# Patient Record
Sex: MALE | Race: WHITE | Employment: OTHER | URBAN - NONMETROPOLITAN AREA
[De-identification: names, ages, dates, MRNs, and addresses within clinical notes are randomized per-mention and may not be internally consistent; named-entity substitution may affect disease eponyms.]

---

## 2017-01-25 DIAGNOSIS — G56.03 BILATERAL CARPAL TUNNEL SYNDROME: Primary | ICD-10-CM

## 2017-01-27 RX ORDER — DICLOFENAC SODIUM 75 MG/1
TABLET, DELAYED RELEASE ORAL
Qty: 90 TABLET | Refills: 0 | Status: SHIPPED | OUTPATIENT
Start: 2017-01-27 | End: 2017-06-15

## 2017-02-01 RX ORDER — DICLOFENAC SODIUM 75 MG/1
TABLET, DELAYED RELEASE ORAL
Qty: 90 TABLET | Refills: 0 | OUTPATIENT
Start: 2017-02-01

## 2017-04-04 ENCOUNTER — OFFICE VISIT (OUTPATIENT)
Dept: OTOLARYNGOLOGY | Facility: OTHER | Age: 57
End: 2017-04-04
Attending: PHYSICIAN ASSISTANT
Payer: COMMERCIAL

## 2017-04-04 VITALS
SYSTOLIC BLOOD PRESSURE: 128 MMHG | WEIGHT: 215 LBS | DIASTOLIC BLOOD PRESSURE: 76 MMHG | HEART RATE: 66 BPM | TEMPERATURE: 96.8 F | BODY MASS INDEX: 30.78 KG/M2 | HEIGHT: 70 IN

## 2017-04-04 DIAGNOSIS — H91.93 HEARING LOSS, BILATERAL: ICD-10-CM

## 2017-04-04 DIAGNOSIS — H61.23 BILATERAL IMPACTED CERUMEN: Primary | ICD-10-CM

## 2017-04-04 PROCEDURE — 69210 REMOVE IMPACTED EAR WAX UNI: CPT | Performed by: PHYSICIAN ASSISTANT

## 2017-04-04 PROCEDURE — 99213 OFFICE O/P EST LOW 20 MIN: CPT | Mod: 25 | Performed by: PHYSICIAN ASSISTANT

## 2017-04-04 PROCEDURE — 92504 EAR MICROSCOPY EXAMINATION: CPT | Mod: TC | Performed by: PHYSICIAN ASSISTANT

## 2017-04-04 PROCEDURE — 99213 OFFICE O/P EST LOW 20 MIN: CPT

## 2017-04-04 RX ORDER — HYDROCODONE BITARTRATE AND ACETAMINOPHEN 5; 325 MG/1; MG/1
1-2 TABLET ORAL
COMMUNITY
Start: 2014-08-25 | End: 2017-04-21

## 2017-04-04 RX ORDER — ACETAMINOPHEN 500 MG
1000 TABLET ORAL
COMMUNITY
End: 2017-04-21

## 2017-04-04 RX ORDER — IBUPROFEN 400 MG/1
400 TABLET, FILM COATED ORAL
COMMUNITY
End: 2017-04-21

## 2017-04-04 ASSESSMENT — PAIN SCALES - GENERAL: PAINLEVEL: MODERATE PAIN (4)
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
## 2017-04-04 NOTE — NURSING NOTE
"Chief Complaint   Patient presents with     Cerumen (impacted)     Ear Cleaning       Initial /76 (Cuff Size: Adult Regular)  Pulse 66  Temp 96.8  F (36  C) (Tympanic)  Ht 5' 10\" (1.778 m)  Wt 215 lb (97.5 kg)  BMI 30.85 kg/m2 Estimated body mass index is 30.85 kg/(m^2) as calculated from the following:    Height as of this encounter: 5' 10\" (1.778 m).    Weight as of this encounter: 215 lb (97.5 kg).  Medication Reconciliation: complete   Patricia Nichole      "

## 2017-04-04 NOTE — MR AVS SNAPSHOT
"              After Visit Summary   4/4/2017    Villa Ruiz    MRN: [id number]           Patient Information     Date Of Birth          1960        Visit Information        Provider Department      4/4/2017 8:45 AM Yee Knowles [facility]-C [facility]        Today's Diagnoses     Bilateral impacted cerumen    -  1    Hearing loss, bilateral          Care Instructions    Ears were cleaned  Follow up as needed for ear cleaning    If there are concerns or questions, Call [phone] and ask for nurse        Follow-ups after your visit        Follow-up notes from your care team     Return if symptoms worsen or fail to improve.      Who to contact     If you have questions or need follow up information about today's clinic visit or your schedule please contact [facility] directly at [phone].  Normal or non-critical lab and imaging results will be communicated to you by Share Your Brainhart, letter or phone within 4 business days after the clinic has received the results. If you do not hear from us within 7 days, please contact the clinic through Share Your Brainhart or phone. If you have a critical or abnormal lab result, we will notify you by phone as soon as possible.  Submit refill requests through Vaxart or call your pharmacy and they will forward the refill request to us. Please allow 3 business days for your refill to be completed.          Additional Information About Your Visit        Share Your Brainhart Information     Vaxart lets you send messages to your doctor, view your test results, renew your prescriptions, schedule appointments and more. To sign up, go to [email or web address] . Click on \"Log in\" on the left side of the screen, which will take you to the Welcome page. Then click on \"Sign up Now\" on the right side of the page.     You will be asked to enter the access code listed below, as well as some personal information. Please follow the directions to create your username and password.     Your access code " "is: [email or web address]  Expires: 7/3/2017  9:05 AM     Your access code will  in 90 days. If you need help or a new code, please call your [facility] clinic or [phone].        Care EveryWhere ID     This is your Care EveryWhere ID. This could be used by other organizations to access your [facility] medical records  [id number]        Your Vitals Were     Pulse Temperature Height BMI (Body Mass Index)          66 96.8  F (36  C) (Tympanic) 5' 10\" (1.778 m) 30.85 kg/m2         Blood Pressure from Last 3 Encounters:   17 128/76   16 120/82   16 124/82    Weight from Last 3 Encounters:   17 215 lb (97.5 kg)   16 210 lb (95.3 kg)   16 215 lb (97.5 kg)              Today, you had the following     No orders found for display       Primary Care Provider Office Phone # Fax #    Judah Moody [phone]       [address] [address]        Thank you!     Thank you for choosing [facility]  [facility] your [facility]. Our goal is always to provide you with excellent care. Hearing back from our patients is one way we can continue to improve our services. Please take a few minutes to complete the written survey that you may receive in the mail after your visit with us. Thank you!             Your Updated Medication List - Protect others around you: Learn how to safely use, store and throw away your medicines at www.disposemymeds.org.          This list is accurate as of: 17  9:05 AM.  Always use your most recent med list.                   Brand Name Dispense Instructions for use    acetaminophen 500 MG tablet    TYLENOL     Take 1,000 mg by mouth       diclofenac 75 MG EC tablet    VOLTAREN    90 tablet    TAKE ONE TABLET BY MOUTH TWICE DAILY WITH FOOD AS NEEDED FOR PAIN       HYDROcodone-acetaminophen 5-325 MG per tablet    NORCO     Take 1-2 tablets by mouth       ibuprofen 400 MG tablet    ADVIL/MOTRIN     Take 400 mg by mouth "       senna-docusate 8.6-50 MG per tablet    SENOKOT-S;PERICOLACE    20 tablet    Take 2 tablets by mouth 2 times daily

## 2017-04-04 NOTE — PATIENT INSTRUCTIONS
Ears were cleaned  Follow up as needed for ear cleaning    If there are concerns or questions, Call [phone] and ask for nurse

## 2017-04-21 ENCOUNTER — OFFICE VISIT (OUTPATIENT)
Dept: FAMILY MEDICINE | Facility: OTHER | Age: 57
End: 2017-04-21
Attending: PHYSICIAN ASSISTANT
Payer: COMMERCIAL

## 2017-04-21 VITALS
HEIGHT: 70 IN | SYSTOLIC BLOOD PRESSURE: 123 MMHG | WEIGHT: 206.8 LBS | BODY MASS INDEX: 29.6 KG/M2 | TEMPERATURE: 97.3 F | DIASTOLIC BLOOD PRESSURE: 92 MMHG | OXYGEN SATURATION: 95 % | HEART RATE: 67 BPM

## 2017-04-21 DIAGNOSIS — M25.50 PAIN IN JOINT, MULTIPLE SITES: Primary | ICD-10-CM

## 2017-04-21 DIAGNOSIS — B34.9 VIRAL SYNDROME: ICD-10-CM

## 2017-04-21 PROCEDURE — 99212 OFFICE O/P EST SF 10 MIN: CPT

## 2017-04-21 PROCEDURE — 99213 OFFICE O/P EST LOW 20 MIN: CPT | Performed by: PHYSICIAN ASSISTANT

## 2017-04-21 ASSESSMENT — PAIN SCALES - GENERAL: PAINLEVEL: NO PAIN (0)

## 2017-04-21 ASSESSMENT — ANXIETY QUESTIONNAIRES
3. WORRYING TOO MUCH ABOUT DIFFERENT THINGS: NOT AT ALL
7. FEELING AFRAID AS IF SOMETHING AWFUL MIGHT HAPPEN: NOT AT ALL
6. BECOMING EASILY ANNOYED OR IRRITABLE: NOT AT ALL
5. BEING SO RESTLESS THAT IT IS HARD TO SIT STILL: NOT AT ALL
1. FEELING NERVOUS, ANXIOUS, OR ON EDGE: NOT AT ALL
GAD7 TOTAL SCORE: 0
2. NOT BEING ABLE TO STOP OR CONTROL WORRYING: NOT AT ALL

## 2017-04-21 ASSESSMENT — PATIENT HEALTH QUESTIONNAIRE - PHQ9: 5. POOR APPETITE OR OVEREATING: NOT AT ALL

## 2017-04-21 NOTE — MR AVS SNAPSHOT
"              After Visit Summary   4/21/2017    Villa Ruiz    MRN: [id number]           Patient Information     Date Of Birth          1960        Visit Information        Provider Department      4/21/2017 10:15 AM Evelyn Live PA [facility]        Today's Diagnoses     Pain in joint, multiple sites    -  1    Viral syndrome           Follow-ups after your visit        Your next 10 appointments already scheduled     May 02, 2017  3:30 PM CDT   (Arrive by 3:15 PM)   SHORT with Judah Moody MD   [facility] ([facility])    [address]  [facility] [place] [address]   [phone]              Who to contact     If you have questions or need follow up information about today's clinic visit or your schedule please contact [facility] directly at [phone].  Normal or non-critical lab and imaging results will be communicated to you by MyChart, letter or phone within 4 business days after the clinic has received the results. If you do not hear from us within 7 days, please contact the clinic through MyChart or phone. If you have a critical or abnormal lab result, we will notify you by phone as soon as possible.  Submit refill requests through [email or web address] or call your pharmacy and they will forward the refill request to us. Please allow 3 business days for your refill to be completed.          Additional Information About Your Visit        Care EveryWhere ID     This is your Care EveryWhere ID. This could be used by other organizations to access your [facility] medical records  [id number]        Your Vitals Were     Pulse Temperature Height Pulse Oximetry BMI (Body Mass Index)       67 97.3  F (36.3  C) (Tympanic) 5' 10\" (1.778 m) 95% 29.67 kg/m2        Blood Pressure from Last 3 Encounters:   04/21/17 (!) 123/92   04/04/17 128/76   09/14/16 120/82    Weight from Last 3 Encounters:   04/21/17 206 lb 12.8 oz (93.8 kg)   04/04/17 215 lb (97.5 kg)   09/14/16 " 210 lb (95.3 kg)              Today, you had the following     No orders found for display         Where to get your medicines      These medications were sent to [id number] - [place], [place] - [address]   [address] , [place] [address]     Phone:  [phone]     diclofenac 1 % Gel topical gel          Primary Care Provider Office Phone # Fax #    Judah Moody [phone]       [address] [address]        Thank you!     Thank you for choosing [facility]  for your care. Our goal is always to provide you with excellent care. Hearing back from our patients is one way we can continue to improve our services. Please take a few minutes to complete the written survey that you may receive in the mail after your visit with us. Thank you!             Your Updated Medication List - Protect others around you: Learn how to safely use, store and throw away your medicines at www.disposemymeds.org.          This list is accurate as of: 4/21/17 12:39 PM.  Always use your most recent med list.                   Brand Name Dispense Instructions for use    diclofenac 1 % Gel topical gel    VOLTAREN    100 g    Place onto the skin as needed for moderate pain       diclofenac 75 MG EC tablet    VOLTAREN    90 tablet    TAKE ONE TABLET BY MOUTH TWICE DAILY WITH FOOD AS NEEDED FOR PAIN

## 2017-04-21 NOTE — PROGRESS NOTES
Subjective:  Villa Ruiz is a 56 year old male  who presents with 1 week history of abdominal pain, diarrhea. No nausea, vomiting or fever. Subsided yesterday.    Active diagnoses this visit:  Data Unavailable    History reviewed. No pertinent past medical history.    History reviewed. No pertinent surgical history.    Family History   Problem Relation Age of Onset     DIABETES Mother        Current Outpatient Prescriptions   Medication     diclofenac (VOLTAREN) 1 % GEL topical gel     diclofenac (VOLTAREN) 75 MG EC tablet     No current facility-administered medications for this visit.        No Known Allergies    Review of Systems:  Gen: negative for fever, change in weight  ENT: negative for ear, mouth and throat problems  Resp: negative for significant cough, SOB or wheeze  CV: negative for chest pain, palpitations   GI: as above  Psych: negative for changes in mood or affect    Objective:    B/P: 123/92, T: 97.3, P: 67, R: Data Unavailable    Physical Exam:  Constitutional: healthy, alert and no acute distress  ENT: Posterior pharynx moist and pink without edema or exudate.  EAC's clear. TM's intact bilateral.  CV: RRR. No murmur  Pulm: Lungs clear to auscultation without wheeze, rales or rhonchi  GI: Abdomen soft, non-tender. BS normal. No masses, organomegaly  Psych: mentation and affect appear normal      Assessment/Plan    (M25.50) Pain in joint, multiple sites  (primary encounter diagnosis)  Comment: refill  Plan: diclofenac (VOLTAREN) 1 % GEL topical gel            (B34.9) Viral syndrome  Comment: Resolving. Symptomatic cares  Plan: return prn              Follow up if symptoms persist or worsen.      Evelyn Live, PAC

## 2017-04-21 NOTE — NURSING NOTE
"Chief Complaint   Patient presents with     Gastrointestinal Problem     Pt has abd cramps and diarrhea for 6 days. Sx have improved. Pt has tried MOM because he had difficulty having a BM.        Initial BP (!) 123/92 (BP Location: Right arm, Patient Position: Chair, Cuff Size: Adult Regular)  Pulse 67  Temp 97.3  F (36.3  C) (Tympanic)  Ht 5' 10\" (1.778 m)  Wt 206 lb 12.8 oz (93.8 kg)  SpO2 95%  BMI 29.67 kg/m2 Estimated body mass index is 29.67 kg/(m^2) as calculated from the following:    Height as of this encounter: 5' 10\" (1.778 m).    Weight as of this encounter: 206 lb 12.8 oz (93.8 kg).  Medication Reconciliation: complete   Lor Busch    "

## 2017-04-22 ASSESSMENT — PATIENT HEALTH QUESTIONNAIRE - PHQ9: SUM OF ALL RESPONSES TO PHQ QUESTIONS 1-9: 0

## 2017-04-22 ASSESSMENT — ANXIETY QUESTIONNAIRES: GAD7 TOTAL SCORE: 0

## 2017-05-02 ENCOUNTER — OFFICE VISIT (OUTPATIENT)
Dept: FAMILY MEDICINE | Facility: OTHER | Age: 57
End: 2017-05-02
Attending: FAMILY MEDICINE
Payer: COMMERCIAL

## 2017-05-02 VITALS
HEART RATE: 65 BPM | SYSTOLIC BLOOD PRESSURE: 136 MMHG | WEIGHT: 207 LBS | BODY MASS INDEX: 29.63 KG/M2 | OXYGEN SATURATION: 96 % | TEMPERATURE: 97.6 F | DIASTOLIC BLOOD PRESSURE: 64 MMHG | HEIGHT: 70 IN

## 2017-05-02 DIAGNOSIS — M79.642 BILATERAL HAND PAIN: Primary | ICD-10-CM

## 2017-05-02 DIAGNOSIS — M79.641 BILATERAL HAND PAIN: Primary | ICD-10-CM

## 2017-05-02 LAB — ERYTHROCYTE [SEDIMENTATION RATE] IN BLOOD BY WESTERGREN METHOD: 15 MM/H (ref 0–20)

## 2017-05-02 PROCEDURE — 86803 HEPATITIS C AB TEST: CPT | Mod: ZL | Performed by: FAMILY MEDICINE

## 2017-05-02 PROCEDURE — 85652 RBC SED RATE AUTOMATED: CPT | Mod: ZL | Performed by: FAMILY MEDICINE

## 2017-05-02 PROCEDURE — 99000 SPECIMEN HANDLING OFFICE-LAB: CPT | Mod: ZL | Performed by: FAMILY MEDICINE

## 2017-05-02 PROCEDURE — 99213 OFFICE O/P EST LOW 20 MIN: CPT | Performed by: FAMILY MEDICINE

## 2017-05-02 PROCEDURE — 99212 OFFICE O/P EST SF 10 MIN: CPT

## 2017-05-02 PROCEDURE — 86618 LYME DISEASE ANTIBODY: CPT | Mod: ZL | Performed by: FAMILY MEDICINE

## 2017-05-02 PROCEDURE — 86431 RHEUMATOID FACTOR QUANT: CPT | Mod: ZL | Performed by: FAMILY MEDICINE

## 2017-05-02 PROCEDURE — 36415 COLL VENOUS BLD VENIPUNCTURE: CPT | Mod: ZL | Performed by: FAMILY MEDICINE

## 2017-05-02 RX ORDER — PREDNISONE 20 MG/1
20 TABLET ORAL 2 TIMES DAILY
Qty: 10 TABLET | Refills: 0 | Status: SHIPPED | OUTPATIENT
Start: 2017-05-02 | End: 2017-06-15

## 2017-05-02 ASSESSMENT — PAIN SCALES - GENERAL: PAINLEVEL: WORST PAIN (10)
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
## 2017-05-02 NOTE — NURSING NOTE
"Chief Complaint   Patient presents with     Musculoskeletal Problem     complaints of arthritic bilateral hand pain.       Initial /64 (BP Location: Left arm, Patient Position: Chair, Cuff Size: Adult Regular)  Pulse 65  Temp 97.6  F (36.4  C) (Tympanic)  Ht 5' 10\" (1.778 m)  Wt 207 lb (93.9 kg)  SpO2 96%  BMI 29.7 kg/m2 Estimated body mass index is 29.7 kg/(m^2) as calculated from the following:    Height as of this encounter: 5' 10\" (1.778 m).    Weight as of this encounter: 207 lb (93.9 kg).  Medication Reconciliation: complete   Marily Klein LPN      "

## 2017-05-02 NOTE — MR AVS SNAPSHOT
"              After Visit Summary   5/2/2017    Villa Ruiz    MRN: [id number]           Patient Information     Date Of Birth          1960        Visit Information        Provider Department      5/2/2017 3:30 PM Judah Moody MD [facility]        Today's Diagnoses     Bilateral hand pain    -  1      Care Instructions    F/u with ongoing concerns.         Follow-ups after your visit        Who to contact     If you have questions or need follow up information about today's clinic visit or your schedule please contact [facility] directly at [phone].  Normal or non-critical lab and imaging results will be communicated to you by MyChart, letter or phone within 4 business days after the clinic has received the results. If you do not hear from us within 7 days, please contact the clinic through MyChart or phone. If you have a critical or abnormal lab result, we will notify you by phone as soon as possible.  Submit refill requests through [employer] or call your pharmacy and they will forward the refill request to us. Please allow 3 business days for your refill to be completed.          Additional Information About Your Visit        Care EveryWhere ID     This is your Care EveryWhere ID. This could be used by other organizations to access your [facility] medical records  [id number]        Your Vitals Were     Pulse Temperature Height Pulse Oximetry BMI (Body Mass Index)       65 97.6  F (36.4  C) (Tympanic) 5' 10\" (1.778 m) 96% 29.7 kg/m2        Blood Pressure from Last 3 Encounters:   05/02/17 136/64   04/21/17 (!) 123/92   04/04/17 128/76    Weight from Last 3 Encounters:   05/02/17 207 lb (93.9 kg)   04/21/17 206 lb 12.8 oz (93.8 kg)   04/04/17 215 lb (97.5 kg)              We Performed the Following     ESR: Erythrocyte sedimentation rate     Hepatitis C antibody     Lyme Disease Marilee with reflex to WB Serum     Rheumatoid factor          Today's Medication Changes        "   These changes are accurate as of: 5/2/17  3:53 PM.  If you have any questions, ask your nurse or doctor.               Start taking these medicines.        Dose/Directions    predniSONE 20 MG tablet   Commonly known as:  DELTASONE   Used for:  Bilateral hand pain   Started by:  Judah Moody MD        Dose:  20 mg   Take 1 tablet (20 mg) by mouth 2 times daily   Quantity:  10 tablet   Refills:  0            Where to get your medicines      These medications were sent to [id number] - \[facility]""[place], [place] - [address]   [address] , [place] [address]     Phone:  [phone]     predniSONE 20 MG tablet                Primary Care Provider Office Phone # Fax #    Judah Moody [phone]       [address] [facility] [address]        Thank you!     Thank you for choosing [facility]  for your care. Our goal is always to provide you with excellent care. Hearing back from our patients is one way we can continue to improve our services. Please take a few minutes to complete the written survey that you may receive in the mail after your visit with us. Thank you!             Your Updated Medication List - Protect others around you: Learn how to safely use, store and throw away your medicines at www.disposemymeds.org.          This list is accurate as of: 5/2/17  3:53 PM.  Always use your most recent med list.                   Brand Name Dispense Instructions for use    diclofenac 1 % Gel topical gel    VOLTAREN    100 g    Place onto the skin as needed for moderate pain       diclofenac 75 MG EC tablet    VOLTAREN    90 tablet    TAKE ONE TABLET BY MOUTH TWICE DAILY WITH FOOD AS NEEDED FOR PAIN       predniSONE 20 MG tablet    DELTASONE    10 tablet    Take 1 tablet (20 mg) by mouth 2 times daily

## 2017-05-04 LAB
B BURGDOR IGG+IGM SER QL: 0.06 (ref 0–0.89)
HCV AB SERPL QL IA: NORMAL
RHEUMATOID FACT SER NEPH-ACNC: <20 IU/ML (ref 0–20)

## 2017-06-15 ENCOUNTER — OFFICE VISIT (OUTPATIENT)
Dept: FAMILY MEDICINE | Facility: OTHER | Age: 57
End: 2017-06-15
Attending: PHYSICIAN ASSISTANT
Payer: COMMERCIAL

## 2017-06-15 VITALS
BODY MASS INDEX: 29.35 KG/M2 | HEIGHT: 70 IN | DIASTOLIC BLOOD PRESSURE: 86 MMHG | WEIGHT: 205 LBS | RESPIRATION RATE: 16 BRPM | OXYGEN SATURATION: 95 % | SYSTOLIC BLOOD PRESSURE: 130 MMHG | HEART RATE: 68 BPM | TEMPERATURE: 98.3 F

## 2017-06-15 DIAGNOSIS — G56.03 BILATERAL CARPAL TUNNEL SYNDROME: ICD-10-CM

## 2017-06-15 DIAGNOSIS — K04.7 TOOTH ABSCESS: Primary | ICD-10-CM

## 2017-06-15 DIAGNOSIS — Z71.89 ACP (ADVANCE CARE PLANNING): Chronic | ICD-10-CM

## 2017-06-15 DIAGNOSIS — M25.50 PAIN IN JOINT, MULTIPLE SITES: ICD-10-CM

## 2017-06-15 PROCEDURE — 99213 OFFICE O/P EST LOW 20 MIN: CPT | Performed by: PHYSICIAN ASSISTANT

## 2017-06-15 PROCEDURE — 99212 OFFICE O/P EST SF 10 MIN: CPT

## 2017-06-15 RX ORDER — DICLOFENAC SODIUM 75 MG/1
TABLET, DELAYED RELEASE ORAL
Qty: 90 TABLET | Refills: 0 | Status: SHIPPED | OUTPATIENT
Start: 2017-06-15 | End: 2017-10-19

## 2017-06-15 ASSESSMENT — PAIN SCALES - GENERAL: PAINLEVEL: MODERATE PAIN (5)

## 2017-06-15 NOTE — PROGRESS NOTES
Subjective:  Villa Ruiz is a 56 year old male who presents with what he thinks is a tick bite with attached tick behind left knee. He cannot see it to remove. Present since Sunday. Next he has a tooth infection. He has been in process of getting teeth extracted. Pain and swelling on the right.        PMH, PSH, FH reviewed and unchanged    Current Outpatient Prescriptions   Medication     amoxicillin-clavulanate (AUGMENTIN) 875-125 MG per tablet     diclofenac (VOLTAREN) 1 % GEL topical gel     diclofenac (VOLTAREN) 75 MG EC tablet     [DISCONTINUED] diclofenac (VOLTAREN) 75 MG EC tablet     No current facility-administered medications for this visit.        No Known Allergies    Review of Systems:  Gen: negative for fever, chills, change in weight  Derm: See HPI       Objective:    B/P: 130/86, T: 98.3, P: 68, R: 16    Physical Exam:  Constitutional: healthy, alert and no distress  Skin: 7 mm scab left leg behind knee. No sign of tick. Erythema left gingiva.  Psych: Mood is euthymic with corresponding affect      Assessment and Plan  (K04.7) Tooth abscess  (primary encounter diagnosis)  Comment: Will treat with antibx. Has dentist appt next week. No tick on leg. Reassured. Not sure how scab formed.  Plan: amoxicillin-clavulanate (AUGMENTIN) 875-125 MG         per tablet            (Z71.89) ACP (advance care planning)      (M25.50) Pain in joint, multiple sites  Comment: refill  Plan: diclofenac (VOLTAREN) 1 % GEL topical gel            (G56.03) Bilateral carpal tunnel syndrome  Comment: refill  Plan: diclofenac (VOLTAREN) 75 MG EC tablet                Rx per EPIC.  Risk/benefit/side effects and intended purposes discussed.   Follow up with worsening sx or failure to improve or with any questions or concerns.     Evelyn Live PA-C

## 2017-06-15 NOTE — MR AVS SNAPSHOT
"              After Visit Summary   6/15/2017    V[place] Ruiz    MRN: [id number]           Patient Information     Date Of Birth          1960        Visit Information        Provider Department      6/15/2017 9:15 AM Evelyn Live PA [facility]        Today's Diagnoses     Tooth abscess    -  1    ACP (advance care planning)        Pain in joint, multiple sites        Bilateral carpal tunnel syndrome           Follow-ups after your visit        Who to contact     If you have questions or need follow up information about today's clinic visit or your schedule please contact [facility] directly at [phone].  Normal or non-critical lab and imaging results will be communicated to you by MyChart, letter or phone within 4 business days after the clinic has received the results. If you do not hear from us within 7 days, please contact the clinic through MyChart or phone. If you have a critical or abnormal lab result, we will notify you by phone as soon as possible.  Submit refill requests through Earbits or call your pharmacy and they will forward the refill request to us. Please allow 3 business days for your refill to be completed.          Additional Information About Your Visit        Care EveryWhere ID     This is your Care EveryWhere ID. This could be used by other organizations to access your [place] medical records  [id number]        Your Vitals Were     Pulse Temperature Respirations Height Pulse Oximetry BMI (Body Mass Index)    68 98.3  F (36.8  C) (Tympanic) 16 5' 10\" (1.778 m) 95% 29.41 kg/m2       Blood Pressure from Last 3 Encounters:   06/15/17 130/86   05/02/17 136/64   04/21/17 (!) 123/92    Weight from Last 3 Encounters:   06/15/17 205 lb (93 kg)   05/02/17 207 lb (93.9 kg)   04/21/17 206 lb 12.8 oz (93.8 kg)              Today, you had the following     No orders found for display         Today's Medication Changes          These changes are accurate as " of: 6/15/17  1:43 PM.  If you have any questions, ask your nurse or doctor.               Start taking these medicines.        Dose/Directions    amoxicillin-clavulanate 875-125 MG per tablet   Commonly known as:  AUGMENTIN   Used for:  Tooth abscess   Started by:  Evelyn Live PA        Dose:  1 tablet   Take 1 tablet by mouth 2 times daily   Quantity:  20 tablet   Refills:  0         These medicines have changed or have updated prescriptions.        Dose/Directions    diclofenac 75 MG EC tablet   Commonly known as:  VOLTAREN   This may have changed:  See the new instructions.   Used for:  Bilateral carpal tunnel syndrome   Changed by:  Evelyn Live PA        TAKE ONE TABLET BY MOUTH TWICE DAILY WITH FOOD AS NEEDED FOR PAIN   Quantity:  90 tablet   Refills:  0            Where to get your medicines      These medications were sent to [id number] - [place], [place] - [address]  [address] , [place] [address]     Phone:  [phone]     amoxicillin-clavulanate 875-125 MG per tablet    diclofenac 1 % Gel topical gel    diclofenac 75 MG EC tablet                Primary Care Provider Office Phone # Fax #    Judah Moody [phone]       [address] [address]        Thank you!     Thank you for choosing [facility]  for your care. Our goal is always to provide you with excellent care. Hearing back from our patients is one way we can continue to improve our services. Please take a few minutes to complete the written survey that you may receive in the mail after your visit with us. Thank you!             Your Updated Medication List - Protect others around you: Learn how to safely use, store and throw away your medicines at www.disposemymeds.org.          This list is accurate as of: 6/15/17  1:43 PM.  Always use your most recent med list.                   Brand Name Dispense Instructions for use    amoxicillin-clavulanate  875-125 MG per tablet    AUGMENTIN    20 tablet    Take 1 tablet by mouth 2 times daily       diclofenac 1 % Gel topical gel    VOLTAREN    100 g    Place onto the skin as needed for moderate pain       diclofenac 75 MG EC tablet    VOLTAREN    90 tablet    TAKE ONE TABLET BY MOUTH TWICE DAILY WITH FOOD AS NEEDED FOR PAIN

## 2017-06-15 NOTE — NURSING NOTE
"Chief Complaint   Patient presents with     Derm Problem     back of left knee he noticed a red spot on Sunday and can't tell if it is a tick bite or not becuase he can't see back there      *_* Health Care Directive *_*     declined info       Initial /86 (BP Location: Left arm, Patient Position: Chair, Cuff Size: Adult Large)  Pulse 68  Temp 98.3  F (36.8  C) (Tympanic)  Resp 16  Ht 5' 10\" (1.778 m)  Wt 205 lb (93 kg)  SpO2 95%  BMI 29.41 kg/m2 Estimated body mass index is 29.41 kg/(m^2) as calculated from the following:    Height as of this encounter: 5' 10\" (1.778 m).    Weight as of this encounter: 205 lb (93 kg).  Medication Reconciliation: complete   Evelia Joseph LPN      "

## 2017-06-16 ENCOUNTER — TELEPHONE (OUTPATIENT)
Dept: FAMILY MEDICINE | Facility: OTHER | Age: 57
End: 2017-06-16

## 2017-06-16 DIAGNOSIS — K04.7 DENTAL INFECTION: Primary | ICD-10-CM

## 2017-06-16 RX ORDER — CEPHALEXIN 500 MG/1
500 CAPSULE ORAL 3 TIMES DAILY
Qty: 30 CAPSULE | Refills: 0 | Status: SHIPPED | OUTPATIENT
Start: 2017-06-16 | End: 2017-12-18

## 2017-06-16 NOTE — TELEPHONE ENCOUNTER
Left a message for the patient to stop the Augmentin (added to Allergy List) and start Cephalexin that was called into [facility].  Evelia Joseph LPN

## 2017-06-16 NOTE — TELEPHONE ENCOUNTER
Reason for call:  Medication    1. Medication Name? Amoxicillin  2. Is this request for a refill? No  3. What Pharmacy do you use? [facility]   4. Have you contacted your pharmacy? No    5. If yes, when?  (Please note that the turn-around-time for prescriptions is 72 business hours; I am sending your request at this time. SEND TO  [facility]  )  Description: Pt has been thowing up all day after taking this medication.  He would like a different rx please.  Was an appointment offered for this a call? No   Preferred method for responding to this messageTelephone Call/[phone]  If we cannot reach you directly, may we leave a detailed response at the number you provided? Yes  Can this message wait until your PCP/Provider returns if not available today? No

## 2017-07-24 ENCOUNTER — TELEPHONE (OUTPATIENT)
Dept: FAMILY MEDICINE | Facility: OTHER | Age: 57
End: 2017-07-24
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
## 2017-07-24 DIAGNOSIS — A69.20 LYME DISEASE: Primary | ICD-10-CM

## 2017-07-24 NOTE — TELEPHONE ENCOUNTER
3:55 PM    Reason for Call: Phone Call    Description: Pt stated he was seen 06/15/17 for tick bite. He was suppose to come in this week to get labs rechecked. He would like to come in on Wed. Please call him back at [phone]    Was an appointment offered for this call? No    Preferred method for responding to this message: Telephone Call    If we cannot reach you directly, may we leave a detailed response at the number you provided? Yes    Can this message wait until your PCP/provider returns, if available today? Not applicable    Ximena Barrera
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
## 2017-07-26 PROCEDURE — 99000 SPECIMEN HANDLING OFFICE-LAB: CPT | Mod: ZL | Performed by: PHYSICIAN ASSISTANT

## 2017-07-26 PROCEDURE — 86618 LYME DISEASE ANTIBODY: CPT | Mod: ZL | Performed by: PHYSICIAN ASSISTANT

## 2017-07-26 PROCEDURE — 36415 COLL VENOUS BLD VENIPUNCTURE: CPT | Mod: ZL | Performed by: PHYSICIAN ASSISTANT

## 2017-07-28 LAB — B BURGDOR IGG+IGM SER QL: 0.06 (ref 0–0.89)

## 2017-09-06 ENCOUNTER — TELEPHONE (OUTPATIENT)
Dept: FAMILY MEDICINE | Facility: OTHER | Age: 57
End: 2017-09-06

## 2017-09-06 DIAGNOSIS — M19.90 ARTHRITIS: Primary | ICD-10-CM

## 2017-09-06 NOTE — TELEPHONE ENCOUNTER
10:54 AM    Reason for Call: Phone Call    Description: Pt called and stated he needs his meds for his arthritis. Did not know the name of them but stated nurse would know what he was talking about. Please call him back at [phone]    Was an appointment offered for this call? No  If yes : Appointment type              Date    Preferred method for responding to this message: Telephone Call  What is your phone number ?    If we cannot reach you directly, may we leave a detailed response at the number you provided? Yes    Can this message wait until your PCP/provider returns, if available today? Not applicable    Ximena Barrera

## 2017-09-06 NOTE — TELEPHONE ENCOUNTER
I called the pt back he does know what medication was. We think it may have been Prednisone from the conversation we had. Please advise.

## 2017-09-07 RX ORDER — PREDNISONE 20 MG/1
20 TABLET ORAL 2 TIMES DAILY
Qty: 10 TABLET | Refills: 0 | Status: SHIPPED | OUTPATIENT
Start: 2017-09-07 | End: 2017-12-18

## 2017-09-07 NOTE — TELEPHONE ENCOUNTER
Sent prednisone.  No nsaids while on prednisone.  F/u with ongoing concerns.  Thanks.  Judah Moody

## 2017-09-29 ENCOUNTER — TELEPHONE (OUTPATIENT)
Dept: FAMILY MEDICINE | Facility: OTHER | Age: 57
End: 2017-09-29

## 2017-09-29 DIAGNOSIS — R79.89 ELEVATED PLATELET COUNT: Primary | ICD-10-CM

## 2017-09-29 NOTE — TELEPHONE ENCOUNTER
Pt was advised to have a FU CBC in 03/2016 due to elevated Platelets. Pt did not come back for this. Should he be recalled?

## 2017-09-29 NOTE — LETTER
[place] [address]  [facility] [address]  Phone: [phone]    10/02/17        Villa Ruiz  [address]  [facility] [address]          Dear Villa,       You are overdue for a blood count. This was tested last in 03/2016 and you were found to have an elevated Platelet count. This was supposed to be repeated in 2 weeks. Your labs have been re-ordered. Please come in to have this done. No appointment is needed. The lab is open from 8 am to 4:30pm, the lab is closed during the noon hour.      Sincerely,      Judah Moody MD

## 2017-10-10 ENCOUNTER — TELEPHONE (OUTPATIENT)
Dept: FAMILY MEDICINE | Facility: OTHER | Age: 57
End: 2017-10-10

## 2017-10-10 DIAGNOSIS — R79.89 ELEVATED PLATELET COUNT: ICD-10-CM

## 2017-10-10 LAB
ERYTHROCYTE [DISTWIDTH] IN BLOOD BY AUTOMATED COUNT: 14.4 % (ref 10–15)
HCT VFR BLD AUTO: 46.1 % (ref 40–53)
HGB BLD-MCNC: 15.4 G/DL (ref 13.3–17.7)
MCH RBC QN AUTO: 30.8 PG (ref 26.5–33)
MCHC RBC AUTO-ENTMCNC: 33.4 G/DL (ref 31.5–36.5)
MCV RBC AUTO: 92 FL (ref 78–100)
PLATELET # BLD AUTO: 252 10E9/L (ref 150–450)
RBC # BLD AUTO: 5 10E12/L (ref 4.4–5.9)
WBC # BLD AUTO: 9.6 10E9/L (ref 4–11)

## 2017-10-10 PROCEDURE — 36415 COLL VENOUS BLD VENIPUNCTURE: CPT | Mod: ZL | Performed by: FAMILY MEDICINE

## 2017-10-10 PROCEDURE — 85027 COMPLETE CBC AUTOMATED: CPT | Mod: ZL | Performed by: FAMILY MEDICINE

## 2017-10-10 NOTE — TELEPHONE ENCOUNTER
He should use the gel and the pill voltaren.  Prednisone if it didn't help is not likely to do so.  Thanks  Judah Moody

## 2017-10-10 NOTE — TELEPHONE ENCOUNTER
I called the pt he was put on Prednisone on 09/06/17 and this has not helped his arthritis. He is wondering if this should be refilled for another week?

## 2017-10-19 DIAGNOSIS — G56.03 BILATERAL CARPAL TUNNEL SYNDROME: ICD-10-CM

## 2017-10-23 RX ORDER — DICLOFENAC SODIUM 75 MG/1
TABLET, DELAYED RELEASE ORAL
Qty: 90 TABLET | Refills: 0 | Status: SHIPPED | OUTPATIENT
Start: 2017-10-23 | End: 2018-02-28

## 2017-10-23 NOTE — TELEPHONE ENCOUNTER
Diclofenac  Last office visit:  6/15/2017 [place]  Last refill:  6/15/2017 # 90 refills: 0                     Annual   BP   Cr.   AST/ALT      Due AST and ALT

## 2017-10-25 DIAGNOSIS — G56.03 BILATERAL CARPAL TUNNEL SYNDROME: ICD-10-CM

## 2017-10-27 NOTE — TELEPHONE ENCOUNTER
Voltaren      Last Written Prescription Date: 10/23/17  Last Fill Quantity: 90,  # refills: 0   Last Office Visit with G, P or [facility] prescribing provider: 6/15/17

## 2017-10-30 RX ORDER — DICLOFENAC SODIUM 75 MG/1
TABLET, DELAYED RELEASE ORAL
Qty: 90 TABLET | Refills: 0 | OUTPATIENT
Start: 2017-10-30

## 2017-12-18 ENCOUNTER — TELEPHONE (OUTPATIENT)
Dept: FAMILY MEDICINE | Facility: OTHER | Age: 57
End: 2017-12-18

## 2017-12-18 ENCOUNTER — OFFICE VISIT (OUTPATIENT)
Dept: FAMILY MEDICINE | Facility: OTHER | Age: 57
End: 2017-12-18
Attending: FAMILY MEDICINE
Payer: COMMERCIAL

## 2017-12-18 VITALS
BODY MASS INDEX: 29.35 KG/M2 | HEART RATE: 83 BPM | HEIGHT: 70 IN | DIASTOLIC BLOOD PRESSURE: 82 MMHG | TEMPERATURE: 98.6 F | WEIGHT: 205 LBS | SYSTOLIC BLOOD PRESSURE: 124 MMHG | OXYGEN SATURATION: 95 %

## 2017-12-18 DIAGNOSIS — Z71.6 TOBACCO ABUSE COUNSELING: ICD-10-CM

## 2017-12-18 DIAGNOSIS — K04.7 DENTAL INFECTION: Primary | ICD-10-CM

## 2017-12-18 DIAGNOSIS — Z72.0 TOBACCO ABUSE: ICD-10-CM

## 2017-12-18 PROCEDURE — 99213 OFFICE O/P EST LOW 20 MIN: CPT | Performed by: FAMILY MEDICINE

## 2017-12-18 PROCEDURE — 99212 OFFICE O/P EST SF 10 MIN: CPT

## 2017-12-18 RX ORDER — CLINDAMYCIN HCL 300 MG
300 CAPSULE ORAL 3 TIMES DAILY
Qty: 30 CAPSULE | Refills: 0 | Status: SHIPPED | OUTPATIENT
Start: 2017-12-18 | End: 2018-09-26

## 2017-12-18 RX ORDER — HYDROCODONE BITARTRATE AND ACETAMINOPHEN 5; 325 MG/1; MG/1
1 TABLET ORAL EVERY 4 HOURS PRN
Qty: 20 TABLET | Refills: 0 | Status: SHIPPED | OUTPATIENT
Start: 2017-12-18 | End: 2018-09-26

## 2017-12-18 ASSESSMENT — ANXIETY QUESTIONNAIRES
3. WORRYING TOO MUCH ABOUT DIFFERENT THINGS: SEVERAL DAYS
4. TROUBLE RELAXING: SEVERAL DAYS
5. BEING SO RESTLESS THAT IT IS HARD TO SIT STILL: SEVERAL DAYS
6. BECOMING EASILY ANNOYED OR IRRITABLE: SEVERAL DAYS
1. FEELING NERVOUS, ANXIOUS, OR ON EDGE: NOT AT ALL
7. FEELING AFRAID AS IF SOMETHING AWFUL MIGHT HAPPEN: SEVERAL DAYS
IF YOU CHECKED OFF ANY PROBLEMS ON THIS QUESTIONNAIRE, HOW DIFFICULT HAVE THESE PROBLEMS MADE IT FOR YOU TO DO YOUR WORK, TAKE CARE OF THINGS AT HOME, OR GET ALONG WITH OTHER PEOPLE: NOT DIFFICULT AT ALL
2. NOT BEING ABLE TO STOP OR CONTROL WORRYING: NOT AT ALL
GAD7 TOTAL SCORE: 5

## 2017-12-18 ASSESSMENT — PATIENT HEALTH QUESTIONNAIRE - PHQ9: SUM OF ALL RESPONSES TO PHQ QUESTIONS 1-9: 5

## 2017-12-18 ASSESSMENT — PAIN SCALES - GENERAL: PAINLEVEL: MILD PAIN (3)

## 2017-12-18 NOTE — PATIENT INSTRUCTIONS

## 2017-12-18 NOTE — MR AVS SNAPSHOT
After Visit Summary   12/18/2017    Villa Ruiz    MRN: [id number]           Patient Information     Date Of Birth          1960        Visit Information        Provider Department      12/18/2017 4:15 PM Judah Moody MD [facility]        Today's Diagnoses     Dental infection    -  1    Tobacco abuse        Tobacco abuse counseling          Care Instructions      HOW TO QUIT SMOKING  Smoking is one of the hardest habits to break. About half of all those who have ever smoked have been able to quit, and most of those (about 70%) who still smoke want to quit. Here are some of the best ways to stop smoking.     KEEP TRYING:  It takes most smokers about 8 tries before they are finally able to fully quit. So, the more often you try and fail, the better your chance of quitting the next time! So, don't give up!    GO COLD TURKEY:  Most ex-smokers quit cold turkey. Trying to cut back gradually doesn't seem to work as well, perhaps because it continues the smoking habit. Also, it is possible to fool yourself by inhaling more while smoking fewer cigarettes. This results in the same amount of nicotine in your body!    GET SUPPORT:  Support programs can make an important difference, especially for the heavy smoker. These groups offer lectures, methods to change your behavior and peer support. Call the free national Quitline for more information. 800-QUIT-NOW (263-031-8601). Low-cost or free programs are offered by many hospitals, local chapters of the American Lung Association (623-668-1945) and the American Cancer Society (238-987-5010). Support at home is important too. Non-smokers can help by offering praise and encouragement. If the smoker fails to quit, encourage them to try again!    OVER-THE-COUNTER MEDICINES:  For those who can't quit on their own, Nicotine Replacement Therapy (NRT) may make quitting much easier. Certain aids such as the nicotine patch, gum and lozenge are  available without a prescription. However, it is best to use these under the guidance of your doctor. The skin patch provides a steady supply of nicotine to the body. Nicotine gum and lozenge gives temporary bursts of low levels of nicotine. Both methods take the edge off the craving for cigarettes. WARNING: If you feel symptoms of nicotine overdose, such as nausea, vomiting, dizziness, weakness, or fast heartbeat, stop using these and see your doctor.    PRESCRIPTION MEDICINES:  After evaluating your smoking patterns and prior attempts at quitting, your doctor may offer a prescription medicine such as bupropion (Zyban, Wellbutrin), varenicline (Chantix, Champix), a niocotine inhaler or nasal spray. Each has its unique advantage and side effects which your doctor can review with you.    HEALTH BENEFITS OF QUITTING:  The benefits of quitting start right away and keep improving the longer you go without smokin minutes: blood pressure and pulse return to normal  8 hours: oxygen levels return to normal  2 days: ability to smell and taste begins to improve as damaged nerves start to regrow  2-3 weeks: circulation and lung function improves  1-9 months: decreased cough, congestion and shortness of breath; less tired  1 year: risk of heart attack decreases by half  5 years: risk of lung cancer decreases by half; risk of stroke becomes the same as a non-smoker  For information about how to quit smoking, visit the following links:  National Cancer [place] ,   Clearing the Air, Quit Smoking Today   - an online booklet. http://www.smokefree.gov/pubs/clearing_the_air.pdf  Smokefree.gov http://smokefree.gov/  QuitNet http://www.quitnet.com/    4356-1930 [employer], [address] [place] [address], [place] [address]. All rights reserved. This information is not intended as a substitute for professional medical care. Always follow your healthcare professional's instructions.    The Benefits of Living Smoke Free  What do you  want to gain from quitting? Check off some reasons to quit.  Health Benefits  ___ Reduce my risk of lung cancer, heart disease, chronic lung disease  ___ Have fewer wrinkles and softer skin  ___ Improve my sense of taste and smell  ___ For pregnant women--reduce the risk of having a miscarriage, stillbirth, premature birth, or low-birth-weight baby  Personal Benefits  ___ Feel more in control of my life  ___ Have better-smelling hair, breath, clothes, home, and car  ___ Save time by not having to take smoke breaks, buy cigarettes, or hunt for a light  ___ Have whiter teeth  Family Benefits  ___ Reduce my children s respiratory tract infections  ___ Set a good example for my children  ___ Reduce my family s cancer risk  Financial Benefits  ___ Save hundreds of dollars each year that would be spent on cigarettes  ___ Save money on medical bills  ___ Save on life, health, and car insurance premiums    Those Dollars Add Up!  Cigarettes are expensive, and getting more expensive all the time. Do you realize how much money you are spending on cigarettes per year? What is the average amount you spend on a pack of cigarettes? What is the average number of packs that you smoke per day? Using your answers to these questions, fill in this formula to help you find out:  ($ _____ per pack) ×  ( _____ number of packs per day) × (365 days) =  $ _____ yearly cost of smoking  Besides tobacco, there are other costs, including extra cleaning bills and replacement costs for clothing and furniture; medical expenses for smoking-related illnesses; and higher health, life, and car insurance premiums.    Cigars and Pipes Count Too!  Cigars and pipes are also dangerous. So are smokeless (chewing) tobacco and snuff. All of these products contain nicotine, a highly addictive substance that has harmful effects on your body. Quitting smoking means giving up all tobacco products.      5251-8191 [employer], [address] [facility], [place], [place]  "[address]. All rights reserved. This information is not intended as a substitute for professional medical care. Always follow your healthcare professional's instructions.          Follow-ups after your visit        Who to contact     If you have questions or need follow up information about today's clinic visit or your schedule please contact [facility] directly at [phone].  Normal or non-critical lab and imaging results will be communicated to you by MyChart, letter or phone within 4 business days after the clinic has received the results. If you do not hear from us within 7 days, please contact the clinic through Ampulsehart or phone. If you have a critical or abnormal lab result, we will notify you by phone as soon as possible.  Submit refill requests through [employer] or call your pharmacy and they will forward the refill request to us. Please allow 3 business days for your refill to be completed.          Additional Information About Your Visit        Ampulsehart Information     [employer] lets you send messages to your doctor, view your test results, renew your prescriptions, schedule appointments and more. To sign up, go to [email or web address] . Click on \"Log in\" on the left side of the screen, which will take you to the Welcome page. Then click on \"Sign up Now\" on the right side of the page.     You will be asked to enter the access code listed below, as well as some personal information. Please follow the directions to create your username and password.     Your access code is: [email or web address]  Expires: 3/18/2018  5:03 PM     Your access code will  in 90 days. If you need help or a new code, please call your [place] clinic or [phone].        Care EveryWhere ID     This is your Care EveryWhere ID. This could be used by other organizations to access your [place] medical records  [id number]        Your Vitals Were     Pulse Temperature Height Pulse Oximetry BMI (Body Mass Index)       83 " "98.6  F (37  C) 5' 10\" (1.778 m) 95% 29.41 kg/m2        Blood Pressure from Last 3 Encounters:   12/18/17 124/82   06/15/17 130/86   05/02/17 136/64    Weight from Last 3 Encounters:   12/18/17 205 lb (93 kg)   06/15/17 205 lb (93 kg)   05/02/17 207 lb (93.9 kg)              We Performed the Following     Tobacco Cessation - Order to Satisfy Health Maintenance          Today's Medication Changes          These changes are accurate as of: 12/18/17  5:03 PM.  If you have any questions, ask your nurse or doctor.               Start taking these medicines.        Dose/Directions    clindamycin 300 MG capsule   Commonly known as:  CLEOCIN   Used for:  Dental infection   Started by:  Judah Moody MD        Dose:  300 mg   Take 1 capsule (300 mg) by mouth 3 times daily   Quantity:  30 capsule   Refills:  0       HYDROcodone-acetaminophen 5-325 MG per tablet   Commonly known as:  NORCO   Used for:  Dental infection   Started by:  Judah Moody MD        Dose:  1 tablet   Take 1 tablet by mouth every 4 hours as needed for moderate to severe pain   Quantity:  20 tablet   Refills:  0            Where to get your medicines      These medications were sent to [id number] - [place], [place] - [address]   [address] , [place] [address]     Phone:  [phone]     clindamycin 300 MG capsule         Some of these will need a paper prescription and others can be bought over the counter.  Ask your nurse if you have questions.     Bring a paper prescription for each of these medications     HYDROcodone-acetaminophen 5-325 MG per tablet                Primary Care Provider Office Phone # Fax #    Judah Moody [phone]       [address] [address]        Equal Access to Services     WAYLON SANTAMARIA AH: Adriana Sosa, wahannada luqadaha, qaybta kaalmada kiran, valeriy ngo. So [facility] [phone].    ATENCIÓN: Si habla " español, tiene a lopez disposición servicios gratuitos de asistencia lingüística. Ami wild [phone].    We comply with applicable federal civil rights laws and [place] laws. We do not discriminate on the basis of race, color, national origin, age, disability, sex, sexual orientation, or gender identity.            Thank you!     Thank you for choosing [facility]  for your care. Our goal is always to provide you with excellent care. Hearing back from our patients is one way we can continue to improve our services. Please take a few minutes to complete the written survey that you may receive in the mail after your visit with us. Thank you!             Your Updated Medication List - Protect others around you: Learn how to safely use, store and throw away your medicines at www.disposemymeds.org.          This list is accurate as of: 12/18/17  5:03 PM.  Always use your most recent med list.                   Brand Name Dispense Instructions for use Diagnosis    clindamycin 300 MG capsule    CLEOCIN    30 capsule    Take 1 capsule (300 mg) by mouth 3 times daily    Dental infection       diclofenac 1 % Gel topical gel    VOLTAREN    100 g    Place onto the skin as needed for moderate pain    Pain in joint, multiple sites       diclofenac 75 MG EC tablet    VOLTAREN    90 tablet    TAKE ONE TABLET BY MOUTH TWICE DAILY WITH FOOD AS NEEDED    Bilateral carpal tunnel syndrome       HYDROcodone-acetaminophen 5-325 MG per tablet    NORCO    20 tablet    Take 1 tablet by mouth every 4 hours as needed for moderate to severe pain    Dental infection

## 2017-12-18 NOTE — TELEPHONE ENCOUNTER
11:36 AM    Reason for Call: OVERBOOK    Patient is having the following symptoms: possible tooth infection/pain for 2 days.    The patient is requesting an appointment for 12/18/2017 or 12/19/2017 with Dr Moody.    Was an appointment offered for this call? Yes  If yes : Appointment type              Date    Preferred method for responding to this message: Telephone Call  What is your phone number ?[phone]    If we cannot reach you directly, may we leave a detailed response at the number you provided? Yes    Can this message wait until your PCP/provider returns, if unavailable today? Linda,     Jesika Vigil

## 2017-12-18 NOTE — TELEPHONE ENCOUNTER
We can see him today at 4:15 pm, arriving at 4:20 pm if this is all he needs to be seen for. If he needs to have other things looked at we can see him tomorrow at 4:15pm, arriving at 4 pm.

## 2017-12-18 NOTE — NURSING NOTE
"Chief Complaint   Patient presents with     Dental Pain     Broke tooth a couple weeks ago, lower left side       Initial /82  Pulse 83  Temp 98.6  F (37  C)  Ht 5' 10\" (1.778 m)  Wt 205 lb (93 kg)  SpO2 95%  BMI 29.41 kg/m2 Estimated body mass index is 29.41 kg/(m^2) as calculated from the following:    Height as of this encounter: 5' 10\" (1.778 m).    Weight as of this encounter: 205 lb (93 kg).  Medication Reconciliation: complete   Haley Bazan    "

## 2017-12-18 NOTE — PROGRESS NOTES
Villa Ruiz    December 18, 2017    Chief Complaint   Patient presents with     Dental Pain     Broke tooth a couple weeks ago, lower left side       SUBJECTIVE:  Here for dental infection.  He has had a few of these.  Getting the foul taste and the drainage.  Very painful and having more swelling and redness in the mouth. Seeing dentist tomorrow.  No fever.      History reviewed. No pertinent past medical history.    History reviewed. No pertinent surgical history.    Current Outpatient Prescriptions   Medication Sig Dispense Refill     clindamycin (CLEOCIN) 300 MG capsule Take 1 capsule (300 mg) by mouth 3 times daily 30 capsule 0     HYDROcodone-acetaminophen (NORCO) 5-325 MG per tablet Take 1 tablet by mouth every 4 hours as needed for moderate to severe pain 20 tablet 0     diclofenac (VOLTAREN) 75 MG EC tablet TAKE ONE TABLET BY MOUTH TWICE DAILY WITH FOOD AS NEEDED 90 tablet 0     diclofenac (VOLTAREN) 1 % GEL topical gel Place onto the skin as needed for moderate pain 100 g 3       No Known Allergies    Family History   Problem Relation Age of Onset     DIABETES Mother        Social History     Social History     Marital status:      Spouse name: N/A     Number of children: N/A     Years of education: N/A     Occupational History     Not on file.     Social History Main Topics     Smoking status: Current Every Day Smoker     Packs/day: 1.00     Types: Cigarettes     Smokeless tobacco: Never Used     Alcohol use No     Drug use: No     Sexual activity: Not on file     Other Topics Concern     Parent/Sibling W/ Cabg, Mi Or Angioplasty Before 65f 55m? No     Social History Narrative       5 point ROS negative except as noted above in HPI, including Gen., Resp., CV, GI &  system review.     OBJECTIVE:  B/P: 124/82, Temperature: 98.6, Pulse: 83, Respirations: Data Unavailable    GENERAL APPEARANCE: Alert, no acute distress  HEENT:  Poor dentition is prominent.  Broken rotted tooth left lower incisor.   Tender adenopathy on the left ant cervical.  SKIN: no suspicious lesions or rashes to visualized skin  NEURO: Alert, oriented x 3, speech and mentation normal    ASSESSMENT and PLAN:  (K04.7) Dental infection  (primary encounter diagnosis)  Comment: reviewed.   Plan: clindamycin (CLEOCIN) 300 MG capsule,         HYDROcodone-acetaminophen (NORCO) 5-325 MG per         tablet        Sent minimal lortab and clinda.  F/u with dentist tomorrow.      (Z72.0) Tobacco abuse  Comment: recommend cessation.   Plan: Tobacco Cessation - Order to Satisfy Health         Maintenance        As above.     (Z71.6) Tobacco abuse counseling  Comment: as above.   Plan: as above.

## 2017-12-19 ASSESSMENT — ANXIETY QUESTIONNAIRES: GAD7 TOTAL SCORE: 5

## 2018-02-28 DIAGNOSIS — G56.03 BILATERAL CARPAL TUNNEL SYNDROME: ICD-10-CM

## 2018-03-01 RX ORDER — DICLOFENAC SODIUM 75 MG/1
TABLET, DELAYED RELEASE ORAL
Qty: 180 TABLET | Refills: 0 | Status: SHIPPED | OUTPATIENT
Start: 2018-03-01 | End: 2019-05-16

## 2018-03-01 NOTE — TELEPHONE ENCOUNTER
Voltaren  Last office visit: 12/18/17  Last refill: 10/23/17 #90  Patient due for labs. Please advise.  Pharmacy looking for 90 day supply.  Thank you.

## 2018-09-26 ENCOUNTER — OFFICE VISIT (OUTPATIENT)
Dept: OTOLARYNGOLOGY | Facility: OTHER | Age: 58
End: 2018-09-26
Attending: PHYSICIAN ASSISTANT
Payer: COMMERCIAL

## 2018-09-26 VITALS
TEMPERATURE: 97.5 F | DIASTOLIC BLOOD PRESSURE: 84 MMHG | RESPIRATION RATE: 18 BRPM | HEIGHT: 70 IN | WEIGHT: 210 LBS | SYSTOLIC BLOOD PRESSURE: 124 MMHG | BODY MASS INDEX: 30.06 KG/M2 | HEART RATE: 72 BPM | OXYGEN SATURATION: 96 %

## 2018-09-26 DIAGNOSIS — H61.23 BILATERAL IMPACTED CERUMEN: Primary | ICD-10-CM

## 2018-09-26 DIAGNOSIS — H91.93 BILATERAL HEARING LOSS, UNSPECIFIED HEARING LOSS TYPE: ICD-10-CM

## 2018-09-26 PROCEDURE — G0463 HOSPITAL OUTPT CLINIC VISIT: HCPCS

## 2018-09-26 PROCEDURE — 69210 REMOVE IMPACTED EAR WAX UNI: CPT | Performed by: PHYSICIAN ASSISTANT

## 2018-09-26 PROCEDURE — 99213 OFFICE O/P EST LOW 20 MIN: CPT | Performed by: PHYSICIAN ASSISTANT

## 2018-09-26 PROCEDURE — 92504 EAR MICROSCOPY EXAMINATION: CPT | Performed by: PHYSICIAN ASSISTANT

## 2018-09-26 ASSESSMENT — PAIN SCALES - GENERAL: PAINLEVEL: NO PAIN (0)

## 2018-09-26 NOTE — PATIENT INSTRUCTIONS
Ears were cleaned.   Normal ear exam.   Return for ear cleaning next year.   Consider hearing test-   Thank you for allowing SHABBIR Campbell and our ENT team to participate in your care.  If your medications are too expensive, please give the nurse a call.  We can possibly change this medication.  If you have a scheduling or an appointment question please contact [facility] Unit Coordinator at their direct line [phone].   ALL nursing questions or concerns can be directed to your ENT nurse at: [phone] marcelino

## 2018-09-26 NOTE — MR AVS SNAPSHOT
After Visit Summary   9/26/2018    Villa Ruiz    MRN: [id number]           Patient Information     Date Of Birth          1960        Visit Information        Provider Department      9/26/2018 2:45 PM Yee Knowles PA-C [facility]        Today's Diagnoses     Bilateral impacted cerumen    -  1    Bilateral hearing loss, unspecified hearing loss type          Care Instructions    Ears were cleaned.   Normal ear exam.   Return for ear cleaning next year.   Consider hearing test-   Thank you for allowing SHABBIR Campbell and our ENT team to participate in your care.  If your medications are too expensive, please give the nurse a call.  We can possibly change this medication.  If you have a scheduling or an appointment question please contact [facility] Unit Coordinator at their direct line [phone].   ALL nursing questions or concerns can be directed to your ENT nurse at: [phone] [facility]              Follow-ups after your visit        Who to contact     If you have questions or need follow up information about today's clinic visit or your schedule please contact [facility] directly at [phone].  Normal or non-critical lab and imaging results will be communicated to you by MyChart, letter or phone within 4 business days after the clinic has received the results. If you do not hear from us within 7 days, please contact the clinic through MyChart or phone. If you have a critical or abnormal lab result, we will notify you by phone as soon as possible.  Submit refill requests through [employer] or call your pharmacy and they will forward the refill request to us. Please allow 3 business days for your refill to be completed.          Additional Information About Your Visit        Care EveryWhere ID     This is your Care EveryWhere ID. This could be used by other organizations to access your [place] medical records  [id number]        Your Vitals  "Were     Pulse Temperature Respirations Height Pulse Oximetry BMI (Body Mass Index)    72 97.5  F (36.4  C) (Tympanic) 18 5' 10\" (1.778 m) 96% 30.13 kg/m2       Blood Pressure from Last 3 Encounters:   09/26/18 124/84   12/18/17 124/82   06/15/17 130/86    Weight from Last 3 Encounters:   09/26/18 210 lb (95.3 kg)   12/18/17 205 lb (93 kg)   06/15/17 205 lb (93 kg)              Today, you had the following     No orders found for display       Primary Care Provider Office Phone # Fax #    Judah Moody [phone]       [address] [place] [address] [address]  [place] [address]        Equal Access to Services     ANA MARIA SANTAMARIA : Hadii emerald rawlso Soalfonso, waaxda luqadaha, qaybta kaalmada adeegyada, valeriy moreno . So [facility] [phone].    ATENCIÓN: Si habla español, tiene a lopez disposición servicios gratuitos de asistencia lingüística. [place] [phone].    We comply with applicable federal civil rights laws and [place] laws. We do not discriminate on the basis of race, color, national origin, age, disability, sex, sexual orientation, or gender identity.            Thank you!     Thank you for choosing [facility]  for your care. Our goal is always to provide you with excellent care. Hearing back from our patients is one way we can continue to improve our services. Please take a few minutes to complete the written survey that you may receive in the mail after your visit with us. Thank you!             Your Updated Medication List - Protect others around you: Learn how to safely use, store and throw away your medicines at www.disposemymeds.org.          This list is accurate as of 9/26/18  3:08 PM.  Always use your most recent med list.                   Brand Name Dispense Instructions for use Diagnosis    diclofenac 1 % Gel topical gel    VOLTAREN    100 g    Place onto the skin as needed for moderate pain    Pain in joint, multiple sites       diclofenac 75 " MG EC tablet    VOLTAREN    180 tablet    TAKE ONE TABLET BY MOUTH TWICE DAILY WITH FOOD AS NEEDED    Bilateral carpal tunnel syndrome
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
## 2019-05-16 DIAGNOSIS — G56.03 BILATERAL CARPAL TUNNEL SYNDROME: ICD-10-CM

## 2019-05-16 DIAGNOSIS — M25.50 PAIN IN JOINT, MULTIPLE SITES: ICD-10-CM

## 2019-05-20 RX ORDER — DICLOFENAC SODIUM 75 MG/1
TABLET, DELAYED RELEASE ORAL
Qty: 30 TABLET | Refills: 0 | Status: SHIPPED | OUTPATIENT
Start: 2019-05-20 | End: 2019-10-21

## 2019-05-21 ENCOUNTER — TRANSFERRED RECORDS (OUTPATIENT)
Dept: HEALTH INFORMATION MANAGEMENT | Facility: CLINIC | Age: 59
End: 2019-05-21

## 2019-09-27 ENCOUNTER — OFFICE VISIT (OUTPATIENT)
Dept: OTOLARYNGOLOGY | Facility: OTHER | Age: 59
End: 2019-09-27
Attending: PHYSICIAN ASSISTANT
Payer: COMMERCIAL

## 2019-09-27 VITALS
BODY MASS INDEX: 30.06 KG/M2 | HEART RATE: 78 BPM | DIASTOLIC BLOOD PRESSURE: 78 MMHG | WEIGHT: 210 LBS | HEIGHT: 70 IN | OXYGEN SATURATION: 95 % | TEMPERATURE: 98 F | SYSTOLIC BLOOD PRESSURE: 138 MMHG

## 2019-09-27 DIAGNOSIS — H61.23 BILATERAL IMPACTED CERUMEN: Primary | ICD-10-CM

## 2019-09-27 DIAGNOSIS — H91.93 BILATERAL HEARING LOSS, UNSPECIFIED HEARING LOSS TYPE: ICD-10-CM

## 2019-09-27 DIAGNOSIS — Z72.0 TOBACCO USE: ICD-10-CM

## 2019-09-27 PROCEDURE — 92504 EAR MICROSCOPY EXAMINATION: CPT | Performed by: PHYSICIAN ASSISTANT

## 2019-09-27 PROCEDURE — 99213 OFFICE O/P EST LOW 20 MIN: CPT | Performed by: PHYSICIAN ASSISTANT

## 2019-09-27 PROCEDURE — G0463 HOSPITAL OUTPT CLINIC VISIT: HCPCS

## 2019-09-27 PROCEDURE — 69210 REMOVE IMPACTED EAR WAX UNI: CPT | Performed by: PHYSICIAN ASSISTANT

## 2019-09-27 ASSESSMENT — MIFFLIN-ST. JEOR: SCORE: 1768.8

## 2019-09-27 ASSESSMENT — PAIN SCALES - GENERAL: PAINLEVEL: NO PAIN (0)

## 2019-09-27 NOTE — PATIENT INSTRUCTIONS
Ears were cleaned.   Normal ear exam. No fluid or infection.   Return in 1 year for ear cleaning.   Quit tobacco.     Thank you for allowing Yee Knowles PA-C and our ENT team to participate in your care.  If your medications are too expensive, please give the nurse a call.  We can possibly change this medication.  If you have a scheduling or an appointment question please contact our Health Unit Coordinator at their direct line [phone].   ALL nursing questions or concerns can be directed to your ENT nurse at: [phone] [place]
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
## 2019-09-27 NOTE — NURSING NOTE
"Chief Complaint   Patient presents with     Cerumen Impaction     Pt is here for an ear cleaning.       Initial /78 (BP Location: Left arm, Cuff Size: Adult Regular)   Pulse 78   Temp 98  F (36.7  C) (Tympanic)   Ht 1.778 m (5' 10\")   Wt 95.3 kg (210 lb)   SpO2 95%   BMI 30.13 kg/m   Estimated body mass index is 30.13 kg/m  as calculated from the following:    Height as of this encounter: 1.778 m (5' 10\").    Weight as of this encounter: 95.3 kg (210 lb).  Medication Reconciliation: complete  Hui Fulton LPN    "

## 2019-10-21 ENCOUNTER — OFFICE VISIT (OUTPATIENT)
Dept: FAMILY MEDICINE | Facility: OTHER | Age: 59
End: 2019-10-21
Attending: PHYSICIAN ASSISTANT
Payer: COMMERCIAL

## 2019-10-21 VITALS
OXYGEN SATURATION: 94 % | BODY MASS INDEX: 30.71 KG/M2 | WEIGHT: 214 LBS | DIASTOLIC BLOOD PRESSURE: 88 MMHG | HEART RATE: 71 BPM | SYSTOLIC BLOOD PRESSURE: 138 MMHG

## 2019-10-21 DIAGNOSIS — H02.823 EYELID CYST, RIGHT: Primary | ICD-10-CM

## 2019-10-21 DIAGNOSIS — Z23 NEED FOR PROPHYLACTIC VACCINATION AND INOCULATION AGAINST INFLUENZA: ICD-10-CM

## 2019-10-21 DIAGNOSIS — Z72.0 TOBACCO ABUSE: ICD-10-CM

## 2019-10-21 PROCEDURE — 90682 RIV4 VACC RECOMBINANT DNA IM: CPT

## 2019-10-21 PROCEDURE — G0463 HOSPITAL OUTPT CLINIC VISIT: HCPCS | Mod: 25

## 2019-10-21 PROCEDURE — 90471 IMMUNIZATION ADMIN: CPT | Performed by: PHYSICIAN ASSISTANT

## 2019-10-21 PROCEDURE — 99213 OFFICE O/P EST LOW 20 MIN: CPT | Performed by: PHYSICIAN ASSISTANT

## 2019-10-21 ASSESSMENT — PAIN SCALES - GENERAL: PAINLEVEL: NO PAIN (0)

## 2019-10-21 ASSESSMENT — ANXIETY QUESTIONNAIRES
6. BECOMING EASILY ANNOYED OR IRRITABLE: NOT AT ALL
1. FEELING NERVOUS, ANXIOUS, OR ON EDGE: NOT AT ALL
4. TROUBLE RELAXING: NOT AT ALL
7. FEELING AFRAID AS IF SOMETHING AWFUL MIGHT HAPPEN: NOT AT ALL
3. WORRYING TOO MUCH ABOUT DIFFERENT THINGS: NOT AT ALL
GAD7 TOTAL SCORE: 0
2. NOT BEING ABLE TO STOP OR CONTROL WORRYING: NOT AT ALL
5. BEING SO RESTLESS THAT IT IS HARD TO SIT STILL: NOT AT ALL

## 2019-10-21 ASSESSMENT — PATIENT HEALTH QUESTIONNAIRE - PHQ9: SUM OF ALL RESPONSES TO PHQ QUESTIONS 1-9: 0

## 2019-10-21 NOTE — PROGRESS NOTES
Subjective     Villa Ruiz is a 59 year old male who presents to clinic today for the following health issues: Patient struck eye with a stick in March. Now a lump has formed on the upper right eyelid.    HPI   Eye(s) Problem      Duration: since spring    Description:  Location: right upper eye lid  Pain: no   Redness: no   Discharge: no     Accompanying signs and symptoms: lump above eyelid. Hit with a tree branch a while back and it neve went away.    History (Trauma, foreign body exposure,): hit in eye with tree branch.    Precipitating or alleviating factors (contact use): None    Therapies tried and outcome: None        Patient Active Problem List   Diagnosis     ACP (advance care planning)     History reviewed. No pertinent surgical history.    Social History     Tobacco Use     Smoking status: Current Every Day Smoker     Packs/day: 1.00     Years: 6.00     Pack years: 6.00     Types: Cigarettes, Cigars     Start date: 1/1/2012     Smokeless tobacco: Current User     Types: Chew   Substance Use Topics     Alcohol use: No     Alcohol/week: 0.0 standard drinks     Family History   Problem Relation Age of Onset     Diabetes Mother          Current Outpatient Medications   Medication Sig Dispense Refill     diclofenac (VOLTAREN) 1 % topical gel PLACE ONTO THE SKIN AS NEEDED FOR MODERATE PAIN 100 g 0     No Known Allergies      Reviewed and updated as needed this visit by Provider         Review of Systems   ROS COMP: Constitutional, HEENT, cardiovascular, pulmonary, gi and gu systems are negative, except as otherwise noted.      Objective    There were no vitals taken for this visit.  There is no height or weight on file to calculate BMI.  Physical Exam           Physical Exam:  Constitutional: healthy, alert and no acute distress  Skin: No suspicious rash or skin lesion  ENT: Posterior pharynx moist and pink without edema or exudate.  EAC's clear. Right TM intact. Left TM intact. 8mm mobile subcutaneous cyst  "right upper eyelid.  Psych: mentation and affect appear normal                      Assessment & Plan     (H02.823) Eyelid cyst, right  (primary encounter diagnosis)  Comment: Refer for excision  Plan: OPHTHALMOLOGY ADULT REFERRAL            (Z23) Need for prophylactic vaccination and inoculation against influenza  Plan: Vaccine Administration, Initial [01623]            (Z72.0) Tobacco abuse  Comment: Discussion with patient regarding the risks associated with tobacco including dependency and health related illnesses including lung cancer.The health benefits of stopping tobacco abuse are discussed. Discussed options available to help patient stop tobacco use including medications and support network. Patient shows good understanding             Tobacco Cessation:   reports that he has been smoking cigarettes and cigars. He started smoking about 7 years ago. He has a 6.00 pack-year smoking history. His smokeless tobacco use includes chew.        BMI:   Estimated body mass index is 30.71 kg/m  as calculated from the following:    Height as of 9/27/19: 1.778 m (5' 10\").    Weight as of this encounter: 97.1 kg (214 lb).           Follow up as needed        SHABBIR Tan  [facility]      "

## 2019-10-21 NOTE — NURSING NOTE
"Chief Complaint   Patient presents with     Eye Problem     Imm/Inj     Flu Shot       Initial /88   Pulse 71   Wt 97.1 kg (214 lb)   SpO2 94%   BMI 30.71 kg/m   Estimated body mass index is 30.71 kg/m  as calculated from the following:    Height as of 9/27/19: 1.778 m (5' 10\").    Weight as of this encounter: 97.1 kg (214 lb).  Medication Reconciliation: complete  Ana Li MA    "

## 2019-10-22 ENCOUNTER — OFFICE VISIT (OUTPATIENT)
Dept: CHIROPRACTIC MEDICINE | Facility: OTHER | Age: 59
End: 2019-10-22
Attending: CHIROPRACTOR
Payer: COMMERCIAL

## 2019-10-22 DIAGNOSIS — M99.02 SEGMENTAL AND SOMATIC DYSFUNCTION OF THORACIC REGION: ICD-10-CM

## 2019-10-22 DIAGNOSIS — M54.2 CERVICALGIA: ICD-10-CM

## 2019-10-22 DIAGNOSIS — M99.01 SEGMENTAL AND SOMATIC DYSFUNCTION OF CERVICAL REGION: Primary | ICD-10-CM

## 2019-10-22 PROCEDURE — 99201 ZZC OFFICE/OUTPT VISIT, NEW, LEVEL I: CPT | Mod: 25 | Performed by: CHIROPRACTOR

## 2019-10-22 PROCEDURE — 98940 CHIROPRACT MANJ 1-2 REGIONS: CPT | Mod: AT | Performed by: CHIROPRACTOR

## 2019-10-22 ASSESSMENT — ANXIETY QUESTIONNAIRES: GAD7 TOTAL SCORE: 0

## 2019-10-22 NOTE — PROGRESS NOTES
Subjective Finding:    Chief compalint: Patient presents with:  Neck Pain  , Pain Scale: 7/10, Intensity: sharp, Duration: 2 months, Radiating: no.    Date of injury:     Activities that the pain restricts:   Home/household/hobbies/social activities: yes.  Work duties: yes.  Sleep: yes.  Makes symptoms better: rest.  Makes symptoms worse: cervical extension and cervical flexion.  Have you seen anyone else for the symptoms? No.  Work related: no.  Automobile related injury: no.    Objective and Assessment:    Posture Analysis:   High shoulder: .  Head tilt: .  High iliac crest: .  Head carriage: forward.  Thoracic Kyphosis: neutral.  Lumbar Lordosis: neutral.    Lumbar Range of Motion: .  Cervical Range of Motion: extension decreased, left lateral flexion decreased and right lateral flexion decreased.  Thoracic Range of Motion: .  Extremity Range of Motion: .    Palpation:   Traps: sharp pain, referred pain: no    Segmental dysfunction pre-treatment and treatment area: C2, C5, C6 and T2.    Assessment post-treatment:  Cervical: ROM increased.  Thoracic: ROM increased.  Lumbar: .    Comments: .      Complicating Factors: .    Procedure(s):  CMT:  53995 Chiropractic manipulative treatment 1-2 regions performed   Cervical: Diversified, See above for level, Supine and Thoracic: Diversified, See above for level, Prone    Modalities:  None performed this visit    Therapeutic procedures:  None    Plan:  Treatment plan: PRN.  Instructed patient: stretch as instructed at visit.  Short term goals: reduce pain.  Long term goals: restore normal function.  Prognosis: good.

## 2019-10-25 ENCOUNTER — OFFICE VISIT (OUTPATIENT)
Dept: CHIROPRACTIC MEDICINE | Facility: OTHER | Age: 59
End: 2019-10-25
Attending: CHIROPRACTOR
Payer: COMMERCIAL

## 2019-10-25 DIAGNOSIS — M99.01 SEGMENTAL AND SOMATIC DYSFUNCTION OF CERVICAL REGION: Primary | ICD-10-CM

## 2019-10-25 DIAGNOSIS — M99.02 SEGMENTAL AND SOMATIC DYSFUNCTION OF THORACIC REGION: ICD-10-CM

## 2019-10-25 DIAGNOSIS — M54.2 CERVICALGIA: ICD-10-CM

## 2019-10-25 PROCEDURE — 98940 CHIROPRACT MANJ 1-2 REGIONS: CPT | Mod: AT | Performed by: CHIROPRACTOR

## 2019-10-25 NOTE — PROGRESS NOTES
Subjective Finding:    Chief compalint: Patient presents with:  Neck Pain  , Pain Scale: 7/10, Intensity: sharp, Duration: 2 months, Radiating: no.    Date of injury:     Activities that the pain restricts:   Home/household/hobbies/social activities: yes.  Work duties: yes.  Sleep: yes.  Makes symptoms better: rest.  Makes symptoms worse: cervical extension and cervical flexion.  Have you seen anyone else for the symptoms? No.  Work related: no.  Automobile related injury: no.    Objective and Assessment:    Posture Analysis:   High shoulder: .  Head tilt: .  High iliac crest: .  Head carriage: forward.  Thoracic Kyphosis: neutral.  Lumbar Lordosis: neutral.    Lumbar Range of Motion: .  Cervical Range of Motion: extension decreased, left lateral flexion decreased and right lateral flexion decreased.  Thoracic Range of Motion: .  Extremity Range of Motion: .    Palpation:   Traps: sharp pain, referred pain: no    Segmental dysfunction pre-treatment and treatment area: C2, C5, C6 and T2.    Assessment post-treatment:  Cervical: ROM increased.  Thoracic: ROM increased.  Lumbar: .    Comments: .      Complicating Factors: .    Procedure(s):  CMT:  29865 Chiropractic manipulative treatment 1-2 regions performed   Cervical: Diversified, See above for level, Supine and Thoracic: Diversified, See above for level, Prone    Modalities:  None performed this visit    Therapeutic procedures:  None    Plan:  Treatment plan: PRN.  Instructed patient: stretch as instructed at visit.  Short term goals: reduce pain.  Long term goals: restore normal function.  Prognosis: good.

## 2019-10-28 ENCOUNTER — OFFICE VISIT (OUTPATIENT)
Dept: CHIROPRACTIC MEDICINE | Facility: OTHER | Age: 59
End: 2019-10-28
Attending: CHIROPRACTOR
Payer: COMMERCIAL

## 2019-10-28 DIAGNOSIS — M54.2 CERVICALGIA: ICD-10-CM

## 2019-10-28 DIAGNOSIS — M99.01 SEGMENTAL AND SOMATIC DYSFUNCTION OF CERVICAL REGION: Primary | ICD-10-CM

## 2019-10-28 DIAGNOSIS — M99.02 SEGMENTAL AND SOMATIC DYSFUNCTION OF THORACIC REGION: ICD-10-CM

## 2019-10-28 PROCEDURE — 98940 CHIROPRACT MANJ 1-2 REGIONS: CPT | Mod: AT | Performed by: CHIROPRACTOR

## 2019-10-28 NOTE — PROGRESS NOTES
Subjective Finding:    Chief compalint: Patient presents with:  Neck Pain  , Pain Scale: 7/10, Intensity: sharp, Duration: 2 months, Radiating: no.    Date of injury:     Activities that the pain restricts:   Home/household/hobbies/social activities: yes.  Work duties: yes.  Sleep: yes.  Makes symptoms better: rest.  Makes symptoms worse: cervical extension and cervical flexion.  Have you seen anyone else for the symptoms? No.  Work related: no.  Automobile related injury: no.    Objective and Assessment:    Posture Analysis:   High shoulder: .  Head tilt: .  High iliac crest: .  Head carriage: forward.  Thoracic Kyphosis: neutral.  Lumbar Lordosis: neutral.    Lumbar Range of Motion: .  Cervical Range of Motion: extension decreased, left lateral flexion decreased and right lateral flexion decreased.  Thoracic Range of Motion: .  Extremity Range of Motion: .    Palpation:   Traps: sharp pain, referred pain: no    Segmental dysfunction pre-treatment and treatment area: C2, C5, C6 and T2.    Assessment post-treatment:  Cervical: ROM increased.  Thoracic: ROM increased.  Lumbar: .    Comments: .      Complicating Factors: .    Procedure(s):  CMT:  97002 Chiropractic manipulative treatment 1-2 regions performed   Cervical: Diversified, See above for level, Supine and Thoracic: Diversified, See above for level, Prone    Modalities:  None performed this visit    Therapeutic procedures:  None    Plan:  Treatment plan: PRN.  Instructed patient: stretch as instructed at visit.  Short term goals: reduce pain.  Long term goals: restore normal function.  Prognosis: good.

## 2019-10-31 ENCOUNTER — OFFICE VISIT (OUTPATIENT)
Dept: CHIROPRACTIC MEDICINE | Facility: OTHER | Age: 59
End: 2019-10-31
Attending: CHIROPRACTOR
Payer: COMMERCIAL

## 2019-10-31 DIAGNOSIS — M99.02 SEGMENTAL AND SOMATIC DYSFUNCTION OF THORACIC REGION: ICD-10-CM

## 2019-10-31 DIAGNOSIS — M99.01 SEGMENTAL AND SOMATIC DYSFUNCTION OF CERVICAL REGION: Primary | ICD-10-CM

## 2019-10-31 DIAGNOSIS — M54.2 CERVICALGIA: ICD-10-CM

## 2019-10-31 PROCEDURE — 98940 CHIROPRACT MANJ 1-2 REGIONS: CPT | Mod: AT | Performed by: CHIROPRACTOR

## 2019-10-31 NOTE — PROGRESS NOTES
Subjective Finding:    Chief compalint: Patient presents with:  Neck Pain  , Pain Scale: 7/10, Intensity: sharp, Duration: 2 months, Radiating: no.    Date of injury:     Activities that the pain restricts:   Home/household/hobbies/social activities: yes.  Work duties: yes.  Sleep: yes.  Makes symptoms better: rest.  Makes symptoms worse: cervical extension and cervical flexion.  Have you seen anyone else for the symptoms? No.  Work related: no.  Automobile related injury: no.    Objective and Assessment:    Posture Analysis:   High shoulder: .  Head tilt: .  High iliac crest: .  Head carriage: forward.  Thoracic Kyphosis: neutral.  Lumbar Lordosis: neutral.    Lumbar Range of Motion: .  Cervical Range of Motion: extension decreased, left lateral flexion decreased and right lateral flexion decreased.  Thoracic Range of Motion: .  Extremity Range of Motion: .    Palpation:   Traps: sharp pain, referred pain: no    Segmental dysfunction pre-treatment and treatment area: C2, C5, C6 and T2.    Assessment post-treatment:  Cervical: ROM increased.  Thoracic: ROM increased.  Lumbar: .    Comments: .      Complicating Factors: .    Procedure(s):  CMT:  06458 Chiropractic manipulative treatment 1-2 regions performed   Cervical: Diversified, See above for level, Supine and Thoracic: Diversified, See above for level, Prone    Modalities:  None performed this visit    Therapeutic procedures:  None    Plan:  Treatment plan: PRN.  Instructed patient: stretch as instructed at visit.  Short term goals: reduce pain.  Long term goals: restore normal function.  Prognosis: good.

## 2019-11-04 ENCOUNTER — OFFICE VISIT (OUTPATIENT)
Dept: CHIROPRACTIC MEDICINE | Facility: OTHER | Age: 59
End: 2019-11-04
Attending: CHIROPRACTOR
Payer: COMMERCIAL

## 2019-11-04 DIAGNOSIS — M99.01 SEGMENTAL AND SOMATIC DYSFUNCTION OF CERVICAL REGION: Primary | ICD-10-CM

## 2019-11-04 DIAGNOSIS — M54.2 CERVICALGIA: ICD-10-CM

## 2019-11-04 DIAGNOSIS — M99.02 SEGMENTAL AND SOMATIC DYSFUNCTION OF THORACIC REGION: ICD-10-CM

## 2019-11-04 PROCEDURE — 98940 CHIROPRACT MANJ 1-2 REGIONS: CPT | Mod: AT | Performed by: CHIROPRACTOR

## 2019-11-04 NOTE — PROGRESS NOTES
Subjective Finding:    Chief compalint: Patient presents with:  Neck Pain  , Pain Scale: 7/10, Intensity: sharp, Duration: 2 months, Radiating: no.    Date of injury:     Activities that the pain restricts:   Home/household/hobbies/social activities: yes.  Work duties: yes.  Sleep: yes.  Makes symptoms better: rest.  Makes symptoms worse: cervical extension and cervical flexion.  Have you seen anyone else for the symptoms? No.  Work related: no.  Automobile related injury: no.    Objective and Assessment:    Posture Analysis:   High shoulder: .  Head tilt: .  High iliac crest: .  Head carriage: forward.  Thoracic Kyphosis: neutral.  Lumbar Lordosis: neutral.    Lumbar Range of Motion: .  Cervical Range of Motion: extension decreased, left lateral flexion decreased and right lateral flexion decreased.  Thoracic Range of Motion: .  Extremity Range of Motion: .    Palpation:   Traps: sharp pain, referred pain: no    Segmental dysfunction pre-treatment and treatment area: C2, C5, C6 and T2.    Assessment post-treatment:  Cervical: ROM increased.  Thoracic: ROM increased.  Lumbar: .    Comments: .      Complicating Factors: .    Procedure(s):  CMT:  72670 Chiropractic manipulative treatment 1-2 regions performed   Cervical: Diversified, See above for level, Supine and Thoracic: Diversified, See above for level, Prone    Modalities:  None performed this visit    Therapeutic procedures:  None    Plan:  Treatment plan: PRN.  Instructed patient: stretch as instructed at visit.  Short term goals: reduce pain.  Long term goals: restore normal function.  Prognosis: good.

## 2019-11-07 ENCOUNTER — OFFICE VISIT (OUTPATIENT)
Dept: CHIROPRACTIC MEDICINE | Facility: OTHER | Age: 59
End: 2019-11-07
Attending: CHIROPRACTOR
Payer: COMMERCIAL

## 2019-11-07 DIAGNOSIS — M54.2 CERVICALGIA: ICD-10-CM

## 2019-11-07 DIAGNOSIS — M99.02 SEGMENTAL AND SOMATIC DYSFUNCTION OF THORACIC REGION: ICD-10-CM

## 2019-11-07 DIAGNOSIS — M99.01 SEGMENTAL AND SOMATIC DYSFUNCTION OF CERVICAL REGION: Primary | ICD-10-CM

## 2019-11-07 PROCEDURE — 98940 CHIROPRACT MANJ 1-2 REGIONS: CPT | Mod: AT | Performed by: CHIROPRACTOR

## 2019-11-07 NOTE — PROGRESS NOTES
Subjective Finding:    Chief compalint: Patient presents with:  Neck Pain  , Pain Scale: 7/10, Intensity: sharp, Duration: 2 months, Radiating: no.    Date of injury:     Activities that the pain restricts:   Home/household/hobbies/social activities: yes.  Work duties: yes.  Sleep: yes.  Makes symptoms better: rest.  Makes symptoms worse: cervical extension and cervical flexion.  Have you seen anyone else for the symptoms? No.  Work related: no.  Automobile related injury: no.    Objective and Assessment:    Posture Analysis:   High shoulder: .  Head tilt: .  High iliac crest: .  Head carriage: forward.  Thoracic Kyphosis: neutral.  Lumbar Lordosis: neutral.    Lumbar Range of Motion: .  Cervical Range of Motion: extension decreased, left lateral flexion decreased and right lateral flexion decreased.  Thoracic Range of Motion: .  Extremity Range of Motion: .    Palpation:   Traps: sharp pain, referred pain: no    Segmental dysfunction pre-treatment and treatment area: C2, C5, C6 and T2.    Assessment post-treatment:  Cervical: ROM increased.  Thoracic: ROM increased.  Lumbar: .    Comments: .      Complicating Factors: .    Procedure(s):  CMT:  91576 Chiropractic manipulative treatment 1-2 regions performed   Cervical: Diversified, See above for level, Supine and Thoracic: Diversified, See above for level, Prone    Modalities:  None performed this visit    Therapeutic procedures:  None    Plan:  Treatment plan: PRN.  Instructed patient: stretch as instructed at visit.  Short term goals: reduce pain.  Long term goals: restore normal function.  Prognosis: good.

## 2019-11-11 ENCOUNTER — OFFICE VISIT (OUTPATIENT)
Dept: CHIROPRACTIC MEDICINE | Facility: OTHER | Age: 59
End: 2019-11-11
Attending: CHIROPRACTOR
Payer: COMMERCIAL

## 2019-11-11 DIAGNOSIS — M54.2 CERVICALGIA: ICD-10-CM

## 2019-11-11 DIAGNOSIS — M99.02 SEGMENTAL AND SOMATIC DYSFUNCTION OF THORACIC REGION: ICD-10-CM

## 2019-11-11 DIAGNOSIS — M99.01 SEGMENTAL AND SOMATIC DYSFUNCTION OF CERVICAL REGION: Primary | ICD-10-CM

## 2019-11-11 PROCEDURE — 98940 CHIROPRACT MANJ 1-2 REGIONS: CPT | Mod: AT | Performed by: CHIROPRACTOR

## 2019-11-11 NOTE — PROGRESS NOTES
Subjective Finding:    Chief compalint: Patient presents with:  Neck Pain  , Pain Scale: 7/10, Intensity: sharp, Duration: 2 months, Radiating: no.    Date of injury:     Activities that the pain restricts:   Home/household/hobbies/social activities: yes.  Work duties: yes.  Sleep: yes.  Makes symptoms better: rest.  Makes symptoms worse: cervical extension and cervical flexion.  Have you seen anyone else for the symptoms? No.  Work related: no.  Automobile related injury: no.    Objective and Assessment:    Posture Analysis:   High shoulder: .  Head tilt: .  High iliac crest: .  Head carriage: forward.  Thoracic Kyphosis: neutral.  Lumbar Lordosis: neutral.    Lumbar Range of Motion: .  Cervical Range of Motion: extension decreased, left lateral flexion decreased and right lateral flexion decreased.  Thoracic Range of Motion: .  Extremity Range of Motion: .    Palpation:   Traps: sharp pain, referred pain: no    Segmental dysfunction pre-treatment and treatment area: C2, C5, C6 and T2.    Assessment post-treatment:  Cervical: ROM increased.  Thoracic: ROM increased.  Lumbar: .    Comments: .      Complicating Factors: .    Procedure(s):  CMT:  86575 Chiropractic manipulative treatment 1-2 regions performed   Cervical: Diversified, See above for level, Supine and Thoracic: Diversified, See above for level, Prone    Modalities:  None performed this visit    Therapeutic procedures:  None    Plan:  Treatment plan: PRN.  Instructed patient: stretch as instructed at visit.  Short term goals: reduce pain.  Long term goals: restore normal function.  Prognosis: good.

## 2019-11-14 ENCOUNTER — OFFICE VISIT (OUTPATIENT)
Dept: CHIROPRACTIC MEDICINE | Facility: OTHER | Age: 59
End: 2019-11-14
Attending: CHIROPRACTOR
Payer: COMMERCIAL

## 2019-11-14 DIAGNOSIS — M99.02 SEGMENTAL AND SOMATIC DYSFUNCTION OF THORACIC REGION: ICD-10-CM

## 2019-11-14 DIAGNOSIS — M54.2 CERVICALGIA: ICD-10-CM

## 2019-11-14 DIAGNOSIS — M99.01 SEGMENTAL AND SOMATIC DYSFUNCTION OF CERVICAL REGION: Primary | ICD-10-CM

## 2019-11-14 PROCEDURE — 98940 CHIROPRACT MANJ 1-2 REGIONS: CPT | Mod: AT | Performed by: CHIROPRACTOR

## 2019-11-14 NOTE — PROGRESS NOTES
Subjective Finding:    Chief compalint: Patient presents with:  Neck Pain  , Pain Scale: 7/10, Intensity: sharp, Duration: 2 months, Radiating: no.    Date of injury:     Activities that the pain restricts:   Home/household/hobbies/social activities: yes.  Work duties: yes.  Sleep: yes.  Makes symptoms better: rest.  Makes symptoms worse: cervical extension and cervical flexion.  Have you seen anyone else for the symptoms? No.  Work related: no.  Automobile related injury: no.    Objective and Assessment:    Posture Analysis:   High shoulder: .  Head tilt: .  High iliac crest: .  Head carriage: forward.  Thoracic Kyphosis: neutral.  Lumbar Lordosis: neutral.    Lumbar Range of Motion: .  Cervical Range of Motion: extension decreased, left lateral flexion decreased and right lateral flexion decreased.  Thoracic Range of Motion: .  Extremity Range of Motion: .    Palpation:   Traps: sharp pain, referred pain: no    Segmental dysfunction pre-treatment and treatment area: C2, C5, C6 and T2.    Assessment post-treatment:  Cervical: ROM increased.  Thoracic: ROM increased.  Lumbar: .    Comments: .      Complicating Factors: .    Procedure(s):  CMT:  94550 Chiropractic manipulative treatment 1-2 regions performed   Cervical: Diversified, See above for level, Supine and Thoracic: Diversified, See above for level, Prone    Modalities:  None performed this visit    Therapeutic procedures:  None    Plan:  Treatment plan: PRN.  Instructed patient: stretch as instructed at visit.  Short term goals: reduce pain.  Long term goals: restore normal function.  Prognosis: good.              Self

## 2019-11-18 ENCOUNTER — OFFICE VISIT (OUTPATIENT)
Dept: CHIROPRACTIC MEDICINE | Facility: OTHER | Age: 59
End: 2019-11-18
Attending: CHIROPRACTOR
Payer: COMMERCIAL

## 2019-11-18 DIAGNOSIS — M54.2 CERVICALGIA: ICD-10-CM

## 2019-11-18 DIAGNOSIS — M99.02 SEGMENTAL AND SOMATIC DYSFUNCTION OF THORACIC REGION: ICD-10-CM

## 2019-11-18 DIAGNOSIS — M25.50 PAIN IN JOINT, MULTIPLE SITES: ICD-10-CM

## 2019-11-18 DIAGNOSIS — M99.01 SEGMENTAL AND SOMATIC DYSFUNCTION OF CERVICAL REGION: Primary | ICD-10-CM

## 2019-11-18 PROCEDURE — 98940 CHIROPRACT MANJ 1-2 REGIONS: CPT | Mod: AT | Performed by: CHIROPRACTOR

## 2019-11-18 NOTE — PROGRESS NOTES
Subjective Finding:    Chief compalint: Patient presents with:  Neck Pain  , Pain Scale: 7/10, Intensity: sharp, Duration: 2 months, Radiating: no.    Date of injury:     Activities that the pain restricts:   Home/household/hobbies/social activities: yes.  Work duties: yes.  Sleep: yes.  Makes symptoms better: rest.  Makes symptoms worse: cervical extension and cervical flexion.  Have you seen anyone else for the symptoms? No.  Work related: no.  Automobile related injury: no.    Objective and Assessment:    Posture Analysis:   High shoulder: .  Head tilt: .  High iliac crest: .  Head carriage: forward.  Thoracic Kyphosis: neutral.  Lumbar Lordosis: neutral.    Lumbar Range of Motion: .  Cervical Range of Motion: extension decreased, left lateral flexion decreased and right lateral flexion decreased.  Thoracic Range of Motion: .  Extremity Range of Motion: .    Palpation:   Traps: sharp pain, referred pain: no    Segmental dysfunction pre-treatment and treatment area: C2, C5, C6 and T2.    Assessment post-treatment:  Cervical: ROM increased.  Thoracic: ROM increased.  Lumbar: .    Comments: .      Complicating Factors: .    Procedure(s):  CMT:  33470 Chiropractic manipulative treatment 1-2 regions performed   Cervical: Diversified, See above for level, Supine and Thoracic: Diversified, See above for level, Prone    Modalities:  None performed this visit    Therapeutic procedures:  None    Plan:  Treatment plan: PRN.  Instructed patient: stretch as instructed at visit.  Short term goals: reduce pain.  Long term goals: restore normal function.  Prognosis: good.

## 2019-11-20 NOTE — TELEPHONE ENCOUNTER
diclofenac (VOLTAREN) 1 % topical gel      Last Written Prescription Date:  5-20-19  Last Fill Quantity: 100g,   # refills: 0  Last Office Visit: 12-18-17  Future Office visit:    Next 5 appointments (look out 90 days)    Nov 21, 2019  8:00 AM CST  Return Visit with Jase Smith DC  [facility] ([facility]) [address] [place] [address] [id number] [phone]

## 2019-11-21 ENCOUNTER — OFFICE VISIT (OUTPATIENT)
Dept: OTOLARYNGOLOGY | Facility: OTHER | Age: 59
End: 2019-11-21
Attending: PHYSICIAN ASSISTANT
Payer: COMMERCIAL

## 2019-11-21 ENCOUNTER — OFFICE VISIT (OUTPATIENT)
Dept: CHIROPRACTIC MEDICINE | Facility: OTHER | Age: 59
End: 2019-11-21
Attending: CHIROPRACTOR
Payer: COMMERCIAL

## 2019-11-21 VITALS
BODY MASS INDEX: 30.64 KG/M2 | DIASTOLIC BLOOD PRESSURE: 84 MMHG | OXYGEN SATURATION: 95 % | HEIGHT: 70 IN | SYSTOLIC BLOOD PRESSURE: 120 MMHG | TEMPERATURE: 97 F | WEIGHT: 214 LBS | HEART RATE: 78 BPM

## 2019-11-21 DIAGNOSIS — H69.93 DYSFUNCTION OF BOTH EUSTACHIAN TUBES: Primary | ICD-10-CM

## 2019-11-21 DIAGNOSIS — M54.2 CERVICALGIA: ICD-10-CM

## 2019-11-21 DIAGNOSIS — M99.02 SEGMENTAL AND SOMATIC DYSFUNCTION OF THORACIC REGION: ICD-10-CM

## 2019-11-21 DIAGNOSIS — M99.01 SEGMENTAL AND SOMATIC DYSFUNCTION OF CERVICAL REGION: Primary | ICD-10-CM

## 2019-11-21 DIAGNOSIS — H93.8X2 PLUGGED FEELING IN EAR, LEFT: ICD-10-CM

## 2019-11-21 DIAGNOSIS — Z72.0 TOBACCO USE: ICD-10-CM

## 2019-11-21 PROCEDURE — 98940 CHIROPRACT MANJ 1-2 REGIONS: CPT | Mod: AT | Performed by: CHIROPRACTOR

## 2019-11-21 PROCEDURE — 92504 EAR MICROSCOPY EXAMINATION: CPT | Performed by: PHYSICIAN ASSISTANT

## 2019-11-21 PROCEDURE — G0463 HOSPITAL OUTPT CLINIC VISIT: HCPCS | Mod: 25

## 2019-11-21 PROCEDURE — 99213 OFFICE O/P EST LOW 20 MIN: CPT | Mod: 25 | Performed by: PHYSICIAN ASSISTANT

## 2019-11-21 PROCEDURE — 92511 NASOPHARYNGOSCOPY: CPT | Performed by: PHYSICIAN ASSISTANT

## 2019-11-21 RX ORDER — FLUTICASONE PROPIONATE 50 MCG
SPRAY, SUSPENSION (ML) NASAL
Qty: 16 G | Refills: 11 | Status: SHIPPED | OUTPATIENT
Start: 2019-11-21 | End: 2020-03-24

## 2019-11-21 RX ORDER — LORATADINE 10 MG/1
10 CAPSULE, LIQUID FILLED ORAL DAILY
Qty: 30 CAPSULE | Refills: 1 | Status: SHIPPED | OUTPATIENT
Start: 2019-11-21 | End: 2021-06-09

## 2019-11-21 ASSESSMENT — PAIN SCALES - GENERAL: PAINLEVEL: NO PAIN (0)

## 2019-11-21 ASSESSMENT — MIFFLIN-ST. JEOR: SCORE: 1786.95

## 2019-11-21 NOTE — PROGRESS NOTES
Subjective Finding:    Chief compalint: Patient presents with:  Neck Pain  , Pain Scale: 7/10, Intensity: sharp, Duration: 2 months, Radiating: no.    Date of injury:     Activities that the pain restricts:   Home/household/hobbies/social activities: yes.  Work duties: yes.  Sleep: yes.  Makes symptoms better: rest.  Makes symptoms worse: cervical extension and cervical flexion.  Have you seen anyone else for the symptoms? No.  Work related: no.  Automobile related injury: no.    Objective and Assessment:    Posture Analysis:   High shoulder: .  Head tilt: .  High iliac crest: .  Head carriage: forward.  Thoracic Kyphosis: neutral.  Lumbar Lordosis: neutral.    Lumbar Range of Motion: .  Cervical Range of Motion: extension decreased, left lateral flexion decreased and right lateral flexion decreased.  Thoracic Range of Motion: .  Extremity Range of Motion: .    Palpation:   Traps: sharp pain, referred pain: no    Segmental dysfunction pre-treatment and treatment area: C2, C5, C6 and T2.    Assessment post-treatment:  Cervical: ROM increased.  Thoracic: ROM increased.  Lumbar: .    Comments: .      Complicating Factors: .    Procedure(s):  CMT:  35357 Chiropractic manipulative treatment 1-2 regions performed   Cervical: Diversified, See above for level, Supine and Thoracic: Diversified, See above for level, Prone    Modalities:  None performed this visit    Therapeutic procedures:  None    Plan:  Treatment plan: PRN.  Instructed patient: stretch as instructed at visit.  Short term goals: reduce pain.  Long term goals: restore normal function.  Prognosis: good.

## 2019-11-21 NOTE — NURSING NOTE
"Chief Complaint   Patient presents with     Consult     Left Ear Plugged       Initial /84   Pulse 78   Temp 97  F (36.1  C) (Tympanic)   Ht 1.778 m (5' 10\")   Wt 97.1 kg (214 lb)   SpO2 95%   BMI 30.71 kg/m   Estimated body mass index is 30.71 kg/m  as calculated from the following:    Height as of this encounter: 1.778 m (5' 10\").    Weight as of this encounter: 97.1 kg (214 lb).  Medication Reconciliation: complete  Marianela Miranda LPN  "
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
## 2019-11-21 NOTE — PATIENT INSTRUCTIONS
Start Flonase 2 sprays to each nostril twice a day  Start Claritin one tablet daily.     If no improvement- will need audiogram. Contact nurse.     Thank you for allowing Yee Knowles PA-C and our ENT team to participate in your care.  If your medications are too expensive, please give the nurse a call.  We can possibly change this medication.  If you have a scheduling or an appointment question please contact our Health Unit Coordinator at their direct line [phone].   ALL nursing questions or concerns can be directed to your ENT nurse at: [phone] H[place]
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
## 2019-11-25 ENCOUNTER — OFFICE VISIT (OUTPATIENT)
Dept: CHIROPRACTIC MEDICINE | Facility: OTHER | Age: 59
End: 2019-11-25
Attending: CHIROPRACTOR
Payer: COMMERCIAL

## 2019-11-25 DIAGNOSIS — M99.01 SEGMENTAL AND SOMATIC DYSFUNCTION OF CERVICAL REGION: ICD-10-CM

## 2019-11-25 DIAGNOSIS — M99.03 SEGMENTAL AND SOMATIC DYSFUNCTION OF LUMBAR REGION: Primary | ICD-10-CM

## 2019-11-25 DIAGNOSIS — M54.50 ACUTE BILATERAL LOW BACK PAIN WITHOUT SCIATICA: ICD-10-CM

## 2019-11-25 DIAGNOSIS — M99.02 SEGMENTAL AND SOMATIC DYSFUNCTION OF THORACIC REGION: ICD-10-CM

## 2019-11-25 PROCEDURE — 98941 CHIROPRACT MANJ 3-4 REGIONS: CPT | Mod: AT | Performed by: CHIROPRACTOR

## 2019-11-26 NOTE — PROGRESS NOTES
Subjective Finding:    Chief compalint: Patient presents with:  Neck Pain  Back Pain: felt a sharp pain in low back today  , Pain Scale: 7/10, Intensity: sharp, Duration: 2 months, Radiating: no.    Date of injury:     Activities that the pain restricts:   Home/household/hobbies/social activities: yes.  Work duties: yes.  Sleep: yes.  Makes symptoms better: rest.  Makes symptoms worse: cervical extension and cervical flexion.  Have you seen anyone else for the symptoms? No.  Work related: no.  Automobile related injury: no.    Objective and Assessment:    Posture Analysis:   High shoulder: .  Head tilt: .  High iliac crest: .  Head carriage: forward.  Thoracic Kyphosis: neutral.  Lumbar Lordosis: neutral.    Lumbar Range of Motion: .  Cervical Range of Motion: extension decreased, left lateral flexion decreased and right lateral flexion decreased.  Thoracic Range of Motion: .  Extremity Range of Motion: .    Palpation:   Traps: sharp pain, referred pain: no    Segmental dysfunction pre-treatment and treatment area: C2, C5, C6 and T2.  L5    Assessment post-treatment:  Cervical: ROM increased.  Thoracic: ROM increased.  Lumbar: .    Comments: .      Complicating Factors: .    Procedure(s):  CMT:  77528 Chiropractic manipulative treatment 1-2 regions performed   Cervical: Diversified, See above for level, Supine and Thoracic: Diversified, See above for level, Prone    Modalities:  None performed this visit    Therapeutic procedures:  None    Plan:  Treatment plan: PRN.  Instructed patient: stretch as instructed at visit.  Short term goals: reduce pain.  Long term goals: restore normal function.  Prognosis: good.

## 2019-11-27 ENCOUNTER — OFFICE VISIT (OUTPATIENT)
Dept: CHIROPRACTIC MEDICINE | Facility: OTHER | Age: 59
End: 2019-11-27
Attending: CHIROPRACTOR
Payer: COMMERCIAL

## 2019-11-27 DIAGNOSIS — M99.01 SEGMENTAL AND SOMATIC DYSFUNCTION OF CERVICAL REGION: ICD-10-CM

## 2019-11-27 DIAGNOSIS — M54.50 ACUTE BILATERAL LOW BACK PAIN WITHOUT SCIATICA: ICD-10-CM

## 2019-11-27 DIAGNOSIS — M99.02 SEGMENTAL AND SOMATIC DYSFUNCTION OF THORACIC REGION: ICD-10-CM

## 2019-11-27 DIAGNOSIS — M99.03 SEGMENTAL AND SOMATIC DYSFUNCTION OF LUMBAR REGION: Primary | ICD-10-CM

## 2019-11-27 PROCEDURE — 98941 CHIROPRACT MANJ 3-4 REGIONS: CPT | Mod: AT | Performed by: CHIROPRACTOR

## 2019-11-27 NOTE — PROGRESS NOTES
Subjective Finding:    Chief compalint: Patient presents with:  Back Pain  Neck Pain  , Pain Scale: 7/10, Intensity: sharp, Duration: 2 months, Radiating: no.    Date of injury:     Activities that the pain restricts:   Home/household/hobbies/social activities: yes.  Work duties: yes.  Sleep: yes.  Makes symptoms better: rest.  Makes symptoms worse: cervical extension and cervical flexion.  Have you seen anyone else for the symptoms? No.  Work related: no.  Automobile related injury: no.    Objective and Assessment:    Posture Analysis:   High shoulder: .  Head tilt: .  High iliac crest: .  Head carriage: forward.  Thoracic Kyphosis: neutral.  Lumbar Lordosis: neutral.    Lumbar Range of Motion: .  Cervical Range of Motion: extension decreased, left lateral flexion decreased and right lateral flexion decreased.  Thoracic Range of Motion: .  Extremity Range of Motion: .    Palpation:   Traps: sharp pain, referred pain: no    Segmental dysfunction pre-treatment and treatment area: C2, C5, C6 and T2.  L5    Assessment post-treatment:  Cervical: ROM increased.  Thoracic: ROM increased.  Lumbar: .    Comments: .      Complicating Factors: .    Procedure(s):  CMT:  45200 Chiropractic manipulative treatment 1-2 regions performed   Cervical: Diversified, See above for level, Supine and Thoracic: Diversified, See above for level, Prone    Modalities:  None performed this visit    Therapeutic procedures:  None    Plan:  Treatment plan: PRN.  Instructed patient: stretch as instructed at visit.  Short term goals: reduce pain.  Long term goals: restore normal function.  Prognosis: good.

## 2019-12-02 ENCOUNTER — OFFICE VISIT (OUTPATIENT)
Dept: CHIROPRACTIC MEDICINE | Facility: OTHER | Age: 59
End: 2019-12-02
Attending: CHIROPRACTOR
Payer: COMMERCIAL

## 2019-12-02 DIAGNOSIS — M99.03 SEGMENTAL AND SOMATIC DYSFUNCTION OF LUMBAR REGION: Primary | ICD-10-CM

## 2019-12-02 DIAGNOSIS — M99.02 SEGMENTAL AND SOMATIC DYSFUNCTION OF THORACIC REGION: ICD-10-CM

## 2019-12-02 DIAGNOSIS — M54.50 ACUTE LEFT-SIDED LOW BACK PAIN WITHOUT SCIATICA: ICD-10-CM

## 2019-12-02 DIAGNOSIS — M99.01 SEGMENTAL AND SOMATIC DYSFUNCTION OF CERVICAL REGION: ICD-10-CM

## 2019-12-02 PROCEDURE — 98941 CHIROPRACT MANJ 3-4 REGIONS: CPT | Mod: AT | Performed by: CHIROPRACTOR

## 2019-12-02 NOTE — PROGRESS NOTES
Subjective Finding:    Chief compalint: Patient presents with:  Neck Pain  Back Pain: left   , Pain Scale: 7/10, Intensity: sharp, Duration: 2 months, Radiating: no.    Date of injury:     Activities that the pain restricts:   Home/household/hobbies/social activities: yes.  Work duties: yes.  Sleep: yes.  Makes symptoms better: rest.  Makes symptoms worse: cervical extension and cervical flexion.  Have you seen anyone else for the symptoms? No.  Work related: no.  Automobile related injury: no.    Objective and Assessment:    Posture Analysis:   High shoulder: .  Head tilt: .  High iliac crest: .  Head carriage: forward.  Thoracic Kyphosis: neutral.  Lumbar Lordosis: neutral.    Lumbar Range of Motion: .  Cervical Range of Motion: extension decreased, left lateral flexion decreased and right lateral flexion decreased.  Thoracic Range of Motion: .  Extremity Range of Motion: .    Palpation:   Traps: sharp pain, referred pain: no    Segmental dysfunction pre-treatment and treatment area: C2, C5, C6 and T2.  L5    Assessment post-treatment:  Cervical: ROM increased.  Thoracic: ROM increased.  Lumbar: .    Comments: .      Complicating Factors: .    Procedure(s):  [facility]:  45325 Chiropractic manipulative treatment 1-2 regions performed   Cervical: Diversified, See above for level, Supine and Thoracic: Diversified, See above for level, Prone    Modalities:  None performed this visit    Therapeutic procedures:  None    Plan:  Treatment plan: PRN.  Instructed patient: stretch as instructed at visit.  Short term goals: reduce pain.  Long term goals: restore normal function.  Prognosis: good.

## 2019-12-11 ENCOUNTER — OFFICE VISIT (OUTPATIENT)
Dept: CHIROPRACTIC MEDICINE | Facility: OTHER | Age: 59
End: 2019-12-11
Attending: CHIROPRACTOR
Payer: COMMERCIAL

## 2019-12-11 DIAGNOSIS — M99.01 SEGMENTAL AND SOMATIC DYSFUNCTION OF CERVICAL REGION: Primary | ICD-10-CM

## 2019-12-11 DIAGNOSIS — M54.2 CERVICALGIA: ICD-10-CM

## 2019-12-11 DIAGNOSIS — M99.02 SEGMENTAL AND SOMATIC DYSFUNCTION OF THORACIC REGION: ICD-10-CM

## 2019-12-11 DIAGNOSIS — M99.03 SEGMENTAL AND SOMATIC DYSFUNCTION OF LUMBAR REGION: ICD-10-CM

## 2019-12-11 PROCEDURE — 98941 CHIROPRACT MANJ 3-4 REGIONS: CPT | Mod: AT | Performed by: CHIROPRACTOR

## 2019-12-11 NOTE — PROGRESS NOTES
Subjective Finding:    Chief compalint: Patient presents with:  Neck Pain  Back Pain  , Pain Scale: 7/10, Intensity: sharp, Duration: 2 months, Radiating: no.    Date of injury:     Activities that the pain restricts:   Home/household/hobbies/social activities: yes.  Work duties: yes.  Sleep: yes.  Makes symptoms better: rest.  Makes symptoms worse: cervical extension and cervical flexion.  Have you seen anyone else for the symptoms? No.  Work related: no.  Automobile related injury: no.    Objective and Assessment:    Posture Analysis:   High shoulder: .  Head tilt: .  High iliac crest: .  Head carriage: forward.  Thoracic Kyphosis: neutral.  Lumbar Lordosis: neutral.    Lumbar Range of Motion: .  Cervical Range of Motion: extension decreased, left lateral flexion decreased and right lateral flexion decreased.  Thoracic Range of Motion: .  Extremity Range of Motion: .    Palpation:   Traps: sharp pain, referred pain: no    Segmental dysfunction pre-treatment and treatment area: C2, C5, C6 and T2.  L5    Assessment post-treatment:  Cervical: ROM increased.  Thoracic: ROM increased.  Lumbar: .    Comments: .      Complicating Factors: .    Procedure(s):  CMT:  89083 Chiropractic manipulative treatment 1-2 regions performed   Cervical: Diversified, See above for level, Supine and Thoracic: Diversified, See above for level, Prone    Modalities:  None performed this visit    Therapeutic procedures:  None    Plan:  Treatment plan: PRN.  Instructed patient: stretch as instructed at visit.  Short term goals: reduce pain.  Long term goals: restore normal function.  Prognosis: good.

## 2019-12-16 ENCOUNTER — OFFICE VISIT (OUTPATIENT)
Dept: CHIROPRACTIC MEDICINE | Facility: OTHER | Age: 59
End: 2019-12-16
Attending: CHIROPRACTOR
Payer: COMMERCIAL

## 2019-12-16 DIAGNOSIS — M99.03 SEGMENTAL AND SOMATIC DYSFUNCTION OF LUMBAR REGION: ICD-10-CM

## 2019-12-16 DIAGNOSIS — M99.01 SEGMENTAL AND SOMATIC DYSFUNCTION OF CERVICAL REGION: Primary | ICD-10-CM

## 2019-12-16 DIAGNOSIS — M99.02 SEGMENTAL AND SOMATIC DYSFUNCTION OF THORACIC REGION: ICD-10-CM

## 2019-12-16 DIAGNOSIS — M54.2 CERVICALGIA: ICD-10-CM

## 2019-12-16 PROCEDURE — 98941 CHIROPRACT MANJ 3-4 REGIONS: CPT | Mod: AT | Performed by: CHIROPRACTOR

## 2019-12-16 NOTE — PROGRESS NOTES
Subjective Finding:    Chief compalint: Patient presents with:  Back Pain  Neck Pain  , Pain Scale: 7/10, Intensity: sharp, Duration: 2 months, Radiating: no.    Date of injury:     Activities that the pain restricts:   Home/household/hobbies/social activities: yes.  Work duties: yes.  Sleep: yes.  Makes symptoms better: rest.  Makes symptoms worse: cervical extension and cervical flexion.  Have you seen anyone else for the symptoms? No.  Work related: no.  Automobile related injury: no.    Objective and Assessment:    Posture Analysis:   High shoulder: .  Head tilt: .  High iliac crest: .  Head carriage: forward.  Thoracic Kyphosis: neutral.  Lumbar Lordosis: neutral.    Lumbar Range of Motion: .  Cervical Range of Motion: extension decreased, left lateral flexion decreased and right lateral flexion decreased.  Thoracic Range of Motion: .  Extremity Range of Motion: .    Palpation:   Traps: sharp pain, referred pain: no    Segmental dysfunction pre-treatment and treatment area: C2, C5, C6 and T2.  L5    Assessment post-treatment:  Cervical: ROM increased.  Thoracic: ROM increased.  Lumbar: .    Comments: .      Complicating Factors: .    Procedure(s):  CMT:  52132 Chiropractic manipulative treatment 1-2 regions performed   Cervical: Diversified, See above for level, Supine and Thoracic: Diversified, See above for level, Prone    Modalities:  None performed this visit    Therapeutic procedures:  None    Plan:  Treatment plan: PRN.  Instructed patient: stretch as instructed at visit.  Short term goals: reduce pain.  Long term goals: restore normal function.  Prognosis: good.

## 2019-12-20 ENCOUNTER — OFFICE VISIT (OUTPATIENT)
Dept: CHIROPRACTIC MEDICINE | Facility: OTHER | Age: 59
End: 2019-12-20
Attending: CHIROPRACTOR
Payer: COMMERCIAL

## 2019-12-20 DIAGNOSIS — M54.2 CERVICALGIA: ICD-10-CM

## 2019-12-20 DIAGNOSIS — M99.03 SEGMENTAL AND SOMATIC DYSFUNCTION OF LUMBAR REGION: ICD-10-CM

## 2019-12-20 DIAGNOSIS — M99.02 SEGMENTAL AND SOMATIC DYSFUNCTION OF THORACIC REGION: ICD-10-CM

## 2019-12-20 DIAGNOSIS — M99.01 SEGMENTAL AND SOMATIC DYSFUNCTION OF CERVICAL REGION: Primary | ICD-10-CM

## 2019-12-20 PROCEDURE — 98941 CHIROPRACT MANJ 3-4 REGIONS: CPT | Mod: AT | Performed by: CHIROPRACTOR

## 2019-12-23 ENCOUNTER — OFFICE VISIT (OUTPATIENT)
Dept: CHIROPRACTIC MEDICINE | Facility: OTHER | Age: 59
End: 2019-12-23
Attending: CHIROPRACTOR
Payer: COMMERCIAL

## 2019-12-23 DIAGNOSIS — M99.01 SEGMENTAL AND SOMATIC DYSFUNCTION OF CERVICAL REGION: Primary | ICD-10-CM

## 2019-12-23 DIAGNOSIS — M54.2 CERVICALGIA: ICD-10-CM

## 2019-12-23 DIAGNOSIS — M99.03 SEGMENTAL AND SOMATIC DYSFUNCTION OF LUMBAR REGION: ICD-10-CM

## 2019-12-23 DIAGNOSIS — M99.02 SEGMENTAL AND SOMATIC DYSFUNCTION OF THORACIC REGION: ICD-10-CM

## 2019-12-23 PROCEDURE — 98941 CHIROPRACT MANJ 3-4 REGIONS: CPT | Mod: AT | Performed by: CHIROPRACTOR

## 2019-12-23 NOTE — PROGRESS NOTES
Subjective Finding:    Chief compalint: Patient presents with:  Back Pain  Neck Pain  , Pain Scale: 7/10, Intensity: sharp, Duration: 2 months, Radiating: no.    Date of injury:     Activities that the pain restricts:   Home/household/hobbies/social activities: yes.  Work duties: yes.  Sleep: yes.  Makes symptoms better: rest.  Makes symptoms worse: cervical extension and cervical flexion.  Have you seen anyone else for the symptoms? No.  Work related: no.  Automobile related injury: no.    Objective and Assessment:    Posture Analysis:   High shoulder: .  Head tilt: .  High iliac crest: .  Head carriage: forward.  Thoracic Kyphosis: neutral.  Lumbar Lordosis: neutral.    Lumbar Range of Motion: .  Cervical Range of Motion: extension decreased, left lateral flexion decreased and right lateral flexion decreased.  Thoracic Range of Motion: .  Extremity Range of Motion: .    Palpation:   Traps: sharp pain, referred pain: no    Segmental dysfunction pre-treatment and treatment area: C2, C5, C6 and T2.  L5    Assessment post-treatment:  Cervical: ROM increased.  Thoracic: ROM increased.  Lumbar: .    Comments: .      Complicating Factors: .    Procedure(s):  CMT:  30271 Chiropractic manipulative treatment 1-2 regions performed   Cervical: Diversified, See above for level, Supine and Thoracic: Diversified, See above for level, Prone    Modalities:  None performed this visit    Therapeutic procedures:  None    Plan:  Treatment plan: PRN.  Instructed patient: stretch as instructed at visit.  Short term goals: reduce pain.  Long term goals: restore normal function.  Prognosis: good.

## 2019-12-23 NOTE — PROGRESS NOTES
Subjective Finding:    Chief compalint: Patient presents with:  Back Pain  Neck Pain  , Pain Scale: 7/10, Intensity: sharp, Duration: 2 months, Radiating: no.    Date of injury:     Activities that the pain restricts:   Home/household/hobbies/social activities: yes.  Work duties: yes.  Sleep: yes.  Makes symptoms better: rest.  Makes symptoms worse: cervical extension and cervical flexion.  Have you seen anyone else for the symptoms? No.  Work related: no.  Automobile related injury: no.    Objective and Assessment:    Posture Analysis:   High shoulder: .  Head tilt: .  High iliac crest: .  Head carriage: forward.  Thoracic Kyphosis: neutral.  Lumbar Lordosis: neutral.    Lumbar Range of Motion: .  Cervical Range of Motion: extension decreased, left lateral flexion decreased and right lateral flexion decreased.  Thoracic Range of Motion: .  Extremity Range of Motion: .    Palpation:   Traps: sharp pain, referred pain: no    Segmental dysfunction pre-treatment and treatment area: C2, C5, C6 and T2.  L5    Assessment post-treatment:  Cervical: ROM increased.  Thoracic: ROM increased.  Lumbar: .    Comments: .      Complicating Factors: .    Procedure(s):  CMT:  82545 Chiropractic manipulative treatment 1-2 regions performed   Cervical: Diversified, See above for level, Supine and Thoracic: Diversified, See above for level, Prone    Modalities:  None performed this visit    Therapeutic procedures:  None    Plan:  Treatment plan: PRN.  Instructed patient: stretch as instructed at visit.  Short term goals: reduce pain.  Long term goals: restore normal function.  Prognosis: good.

## 2019-12-27 ENCOUNTER — OFFICE VISIT (OUTPATIENT)
Dept: CHIROPRACTIC MEDICINE | Facility: OTHER | Age: 59
End: 2019-12-27
Attending: CHIROPRACTOR
Payer: COMMERCIAL

## 2019-12-27 DIAGNOSIS — M99.01 SEGMENTAL AND SOMATIC DYSFUNCTION OF CERVICAL REGION: Primary | ICD-10-CM

## 2019-12-27 DIAGNOSIS — M99.02 SEGMENTAL AND SOMATIC DYSFUNCTION OF THORACIC REGION: ICD-10-CM

## 2019-12-27 DIAGNOSIS — M54.2 CERVICALGIA: ICD-10-CM

## 2019-12-27 DIAGNOSIS — M99.03 SEGMENTAL AND SOMATIC DYSFUNCTION OF LUMBAR REGION: ICD-10-CM

## 2019-12-27 PROCEDURE — 98941 CHIROPRACT MANJ 3-4 REGIONS: CPT | Mod: AT | Performed by: CHIROPRACTOR

## 2019-12-27 NOTE — PROGRESS NOTES
Subjective Finding:    Chief compalint: Patient presents with:  Neck Pain  , Pain Scale: 7/10, Intensity: sharp, Duration: 2 months, Radiating: no.    Date of injury:     Activities that the pain restricts:   Home/household/hobbies/social activities: yes.  Work duties: yes.  Sleep: yes.  Makes symptoms better: rest.  Makes symptoms worse: cervical extension and cervical flexion.  Have you seen anyone else for the symptoms? No.  Work related: no.  Automobile related injury: no.    Objective and Assessment:    Posture Analysis:   High shoulder: .  Head tilt: .  High iliac crest: .  Head carriage: forward.  Thoracic Kyphosis: neutral.  Lumbar Lordosis: neutral.    Lumbar Range of Motion: .  Cervical Range of Motion: extension decreased, left lateral flexion decreased and right lateral flexion decreased.  Thoracic Range of Motion: .  Extremity Range of Motion: .    Palpation:   Traps: sharp pain, referred pain: no    Segmental dysfunction pre-treatment and treatment area: C2, C5, C6 and T2.  L5    Assessment post-treatment:  Cervical: ROM increased.  Thoracic: ROM increased.  Lumbar: .    Comments: .      Complicating Factors: .    Procedure(s):  CMT:  10073 Chiropractic manipulative treatment 1-2 regions performed   Cervical: Diversified, See above for level, Supine and Thoracic: Diversified, See above for level, Prone    Modalities:  None performed this visit    Therapeutic procedures:  None    Plan:  Treatment plan: PRN.  Instructed patient: stretch as instructed at visit.  Short term goals: reduce pain.  Long term goals: restore normal function.  Prognosis: good.

## 2019-12-30 ENCOUNTER — OFFICE VISIT (OUTPATIENT)
Dept: CHIROPRACTIC MEDICINE | Facility: OTHER | Age: 59
End: 2019-12-30
Attending: CHIROPRACTOR
Payer: COMMERCIAL

## 2019-12-30 DIAGNOSIS — M99.01 SEGMENTAL AND SOMATIC DYSFUNCTION OF CERVICAL REGION: Primary | ICD-10-CM

## 2019-12-30 DIAGNOSIS — M54.2 CERVICALGIA: ICD-10-CM

## 2019-12-30 DIAGNOSIS — M99.03 SEGMENTAL AND SOMATIC DYSFUNCTION OF LUMBAR REGION: ICD-10-CM

## 2019-12-30 DIAGNOSIS — M99.02 SEGMENTAL AND SOMATIC DYSFUNCTION OF THORACIC REGION: ICD-10-CM

## 2019-12-30 PROCEDURE — 98941 CHIROPRACT MANJ 3-4 REGIONS: CPT | Mod: AT | Performed by: CHIROPRACTOR

## 2019-12-30 NOTE — PROGRESS NOTES
Subjective Finding:    Chief compalint: Patient presents with:  Back Pain  Neck Pain  , Pain Scale: 7/10, Intensity: sharp, Duration: 2 months, Radiating: no.    Date of injury:     Activities that the pain restricts:   Home/household/hobbies/social activities: yes.  Work duties: yes.  Sleep: yes.  Makes symptoms better: rest.  Makes symptoms worse: cervical extension and cervical flexion.  Have you seen anyone else for the symptoms? No.  Work related: no.  Automobile related injury: no.    Objective and Assessment:    Posture Analysis:   High shoulder: .  Head tilt: .  High iliac crest: .  Head carriage: forward.  Thoracic Kyphosis: neutral.  Lumbar Lordosis: neutral.    Lumbar Range of Motion: .  Cervical Range of Motion: extension decreased, left lateral flexion decreased and right lateral flexion decreased.  Thoracic Range of Motion: .  Extremity Range of Motion: .    Palpation:   Traps: sharp pain, referred pain: no    Segmental dysfunction pre-treatment and treatment area: C2, C5, C6 and T2.  L5    Assessment post-treatment:  Cervical: ROM increased.  Thoracic: ROM increased.  Lumbar: .    Comments: .      Complicating Factors: .    Procedure(s):  CMT:  60533 Chiropractic manipulative treatment 1-2 regions performed   Cervical: Diversified, See above for level, Supine and Thoracic: Diversified, See above for level, Prone    Modalities:  None performed this visit    Therapeutic procedures:  None    Plan:  Treatment plan: PRN.  Instructed patient: stretch as instructed at visit.  Short term goals: reduce pain.  Long term goals: restore normal function.  Prognosis: good.

## 2020-01-02 ENCOUNTER — TRANSFERRED RECORDS (OUTPATIENT)
Dept: HEALTH INFORMATION MANAGEMENT | Facility: CLINIC | Age: 60
End: 2020-01-02

## 2020-01-03 ENCOUNTER — OFFICE VISIT (OUTPATIENT)
Dept: CHIROPRACTIC MEDICINE | Facility: OTHER | Age: 60
End: 2020-01-03
Attending: CHIROPRACTOR
Payer: COMMERCIAL

## 2020-01-03 DIAGNOSIS — M99.02 SEGMENTAL AND SOMATIC DYSFUNCTION OF THORACIC REGION: ICD-10-CM

## 2020-01-03 DIAGNOSIS — M99.03 SEGMENTAL AND SOMATIC DYSFUNCTION OF LUMBAR REGION: ICD-10-CM

## 2020-01-03 DIAGNOSIS — M54.2 CERVICALGIA: ICD-10-CM

## 2020-01-03 DIAGNOSIS — M99.01 SEGMENTAL AND SOMATIC DYSFUNCTION OF CERVICAL REGION: Primary | ICD-10-CM

## 2020-01-03 PROCEDURE — 98941 CHIROPRACT MANJ 3-4 REGIONS: CPT | Mod: AT | Performed by: CHIROPRACTOR

## 2020-01-03 NOTE — PROGRESS NOTES
Subjective Finding:    Chief compalint: Patient presents with:  Neck Pain  Back Pain  , Pain Scale: 7/10, Intensity: sharp, Duration: 2 months, Radiating: no.    Date of injury:     Activities that the pain restricts:   Home/household/hobbies/social activities: yes.  Work duties: yes.  Sleep: yes.  Makes symptoms better: rest.  Makes symptoms worse: cervical extension and cervical flexion.  Have you seen anyone else for the symptoms? No.  Work related: no.  Automobile related injury: no.    Objective and Assessment:    Posture Analysis:   High shoulder: .  Head tilt: .  High iliac crest: .  Head carriage: forward.  Thoracic Kyphosis: neutral.  Lumbar Lordosis: neutral.    Lumbar Range of Motion: .  Cervical Range of Motion: extension decreased, left lateral flexion decreased and right lateral flexion decreased.  Thoracic Range of Motion: .  Extremity Range of Motion: .    Palpation:   Traps: sharp pain, referred pain: no    Segmental dysfunction pre-treatment and treatment area: C2, C5, C6 and T2.  L5    Assessment post-treatment:  Cervical: ROM increased.  Thoracic: ROM increased.  Lumbar: .    Comments: .      Complicating Factors: .    Procedure(s):  CMT:  93278 Chiropractic manipulative treatment 1-2 regions performed   Cervical: Diversified, See above for level, Supine and Thoracic: Diversified, See above for level, Prone    Modalities:  None performed this visit    Therapeutic procedures:  None    Plan:  Treatment plan: PRN.  Instructed patient: stretch as instructed at visit.  Short term goals: reduce pain.  Long term goals: restore normal function.  Prognosis: good.

## 2020-01-08 ENCOUNTER — OFFICE VISIT (OUTPATIENT)
Dept: CHIROPRACTIC MEDICINE | Facility: OTHER | Age: 60
End: 2020-01-08
Attending: CHIROPRACTOR
Payer: COMMERCIAL

## 2020-01-08 DIAGNOSIS — M99.02 SEGMENTAL AND SOMATIC DYSFUNCTION OF THORACIC REGION: ICD-10-CM

## 2020-01-08 DIAGNOSIS — M54.2 CERVICALGIA: ICD-10-CM

## 2020-01-08 DIAGNOSIS — M99.03 SEGMENTAL AND SOMATIC DYSFUNCTION OF LUMBAR REGION: ICD-10-CM

## 2020-01-08 DIAGNOSIS — M99.01 SEGMENTAL AND SOMATIC DYSFUNCTION OF CERVICAL REGION: Primary | ICD-10-CM

## 2020-01-08 PROCEDURE — 98941 CHIROPRACT MANJ 3-4 REGIONS: CPT | Mod: AT | Performed by: CHIROPRACTOR

## 2020-01-08 PROCEDURE — 99212 OFFICE O/P EST SF 10 MIN: CPT | Mod: 25 | Performed by: CHIROPRACTOR

## 2020-01-09 ENCOUNTER — RESULTS ONLY (OUTPATIENT)
Dept: LAB | Age: 60
End: 2020-01-09

## 2020-01-09 NOTE — PROGRESS NOTES
Subjective Finding:    Chief compalint: Patient presents with:  Back Pain  , Pain Scale: 7/10, Intensity: sharp, Duration: 2 months, Radiating: no.    Date of injury:     Activities that the pain restricts:   Home/household/hobbies/social activities: yes.  Work duties: yes.  Sleep: yes.  Makes symptoms better: rest.  Makes symptoms worse: cervical extension and cervical flexion.  Have you seen anyone else for the symptoms? No.  Work related: no.  Automobile related injury: no.    Objective and Assessment:    Posture Analysis:   High shoulder: .  Head tilt: .  High iliac crest: .  Head carriage: forward.  Thoracic Kyphosis: neutral.  Lumbar Lordosis: neutral.    Lumbar Range of Motion: .  Cervical Range of Motion: extension decreased, left lateral flexion decreased and right lateral flexion decreased.  Thoracic Range of Motion: .  Extremity Range of Motion: .    Palpation:   Traps: sharp pain, referred pain: no    Segmental dysfunction pre-treatment and treatment area: C2, C5, C6 and T2.  L5    Assessment post-treatment:  Cervical: ROM increased.  Thoracic: ROM increased.  Lumbar: .    Comments: .      Complicating Factors: .    Procedure(s):  CMT:  46909 Chiropractic manipulative treatment 1-2 regions performed   Cervical: Diversified, See above for level, Supine and Thoracic: Diversified, See above for level, Prone    Modalities:  None performed this visit    Therapeutic procedures:  None    Plan:  Treatment plan: PRN.  Instructed patient: stretch as instructed at visit.  Short term goals: reduce pain.  Long term goals: restore normal function.  Prognosis: good.

## 2020-01-13 LAB — COPATH REPORT: NORMAL

## 2020-01-17 ENCOUNTER — OFFICE VISIT (OUTPATIENT)
Dept: CHIROPRACTIC MEDICINE | Facility: OTHER | Age: 60
End: 2020-01-17
Attending: CHIROPRACTOR
Payer: COMMERCIAL

## 2020-01-17 DIAGNOSIS — M54.2 CERVICALGIA: ICD-10-CM

## 2020-01-17 DIAGNOSIS — M99.01 SEGMENTAL AND SOMATIC DYSFUNCTION OF CERVICAL REGION: Primary | ICD-10-CM

## 2020-01-17 DIAGNOSIS — M99.02 SEGMENTAL AND SOMATIC DYSFUNCTION OF THORACIC REGION: ICD-10-CM

## 2020-01-17 PROCEDURE — 98940 CHIROPRACT MANJ 1-2 REGIONS: CPT | Mod: AT | Performed by: CHIROPRACTOR

## 2020-01-17 NOTE — PROGRESS NOTES
Subjective Finding:    Chief compalint: Patient presents with:  Neck Pain  , Pain Scale: 7/10, Intensity: sharp, Duration: 2 months, Radiating: no.    Date of injury:     Activities that the pain restricts:   Home/household/hobbies/social activities: yes.  Work duties: yes.  Sleep: yes.  Makes symptoms better: rest.  Makes symptoms worse: cervical extension and cervical flexion.  Have you seen anyone else for the symptoms? No.  Work related: no.  Automobile related injury: no.    Objective and Assessment:    Posture Analysis:   High shoulder: .  Head tilt: .  High iliac crest: .  Head carriage: forward.  Thoracic Kyphosis: neutral.  Lumbar Lordosis: neutral.    Lumbar Range of Motion: .  Cervical Range of Motion: extension decreased, left lateral flexion decreased and right lateral flexion decreased.  Thoracic Range of Motion: .  Extremity Range of Motion: .    Palpation:   Traps: sharp pain, referred pain: no    Segmental dysfunction pre-treatment and treatment area: C2, C5, C6 and T2.  L5    Assessment post-treatment:  Cervical: ROM increased.  Thoracic: ROM increased.  Lumbar: .    Comments: .      Complicating Factors: .    Procedure(s):  CMT:  54576 Chiropractic manipulative treatment 1-2 regions performed   Cervical: Diversified, See above for level, Supine and Thoracic: Diversified, See above for level, Prone    Modalities:  None performed this visit    Therapeutic procedures:  None    Plan:  Treatment plan: PRN.  Instructed patient: stretch as instructed at visit.  Short term goals: reduce pain.  Long term goals: restore normal function.  Prognosis: good.

## 2020-01-27 ENCOUNTER — OFFICE VISIT (OUTPATIENT)
Dept: CHIROPRACTIC MEDICINE | Facility: OTHER | Age: 60
End: 2020-01-27
Attending: CHIROPRACTOR
Payer: COMMERCIAL

## 2020-01-27 DIAGNOSIS — M99.02 SEGMENTAL AND SOMATIC DYSFUNCTION OF THORACIC REGION: ICD-10-CM

## 2020-01-27 DIAGNOSIS — M99.01 SEGMENTAL AND SOMATIC DYSFUNCTION OF CERVICAL REGION: Primary | ICD-10-CM

## 2020-01-27 DIAGNOSIS — M54.2 CERVICALGIA: ICD-10-CM

## 2020-01-27 PROCEDURE — 98940 CHIROPRACT MANJ 1-2 REGIONS: CPT | Mod: AT | Performed by: CHIROPRACTOR

## 2020-01-27 NOTE — PROGRESS NOTES
Subjective Finding:    Chief compalint: Patient presents with:  Neck Pain  , Pain Scale: 7/10, Intensity: sharp, Duration: 2 months, Radiating: no.    Date of injury:     Activities that the pain restricts:   Home/household/hobbies/social activities: yes.  Work duties: yes.  Sleep: yes.  Makes symptoms better: rest.  Makes symptoms worse: cervical extension and cervical flexion.  Have you seen anyone else for the symptoms? No.  Work related: no.  Automobile related injury: no.    Objective and Assessment:    Posture Analysis:   High shoulder: .  Head tilt: .  High iliac crest: .  Head carriage: forward.  Thoracic Kyphosis: neutral.  Lumbar Lordosis: neutral.    Lumbar Range of Motion: .  Cervical Range of Motion: extension decreased, left lateral flexion decreased and right lateral flexion decreased.  Thoracic Range of Motion: .  Extremity Range of Motion: .    Palpation:   Traps: sharp pain, referred pain: no    Segmental dysfunction pre-treatment and treatment area: C2, C5, C6 and T2.  L5    Assessment post-treatment:  Cervical: ROM increased.  Thoracic: ROM increased.  Lumbar: .    Comments: .      Complicating Factors: .    Procedure(s):  CMT:  97771 Chiropractic manipulative treatment 1-2 regions performed   Cervical: Diversified, See above for level, Supine and Thoracic: Diversified, See above for level, Prone    Modalities:  None performed this visit    Therapeutic procedures:  None    Plan:  Treatment plan: PRN.  Instructed patient: stretch as instructed at visit.  Short term goals: reduce pain.  Long term goals: restore normal function.  Prognosis: good.

## 2020-02-13 ENCOUNTER — OFFICE VISIT (OUTPATIENT)
Dept: CHIROPRACTIC MEDICINE | Facility: OTHER | Age: 60
End: 2020-02-13
Attending: CHIROPRACTOR
Payer: COMMERCIAL

## 2020-02-13 DIAGNOSIS — M99.02 SEGMENTAL AND SOMATIC DYSFUNCTION OF THORACIC REGION: ICD-10-CM

## 2020-02-13 DIAGNOSIS — M99.03 SEGMENTAL AND SOMATIC DYSFUNCTION OF LUMBAR REGION: ICD-10-CM

## 2020-02-13 DIAGNOSIS — M99.01 SEGMENTAL AND SOMATIC DYSFUNCTION OF CERVICAL REGION: Primary | ICD-10-CM

## 2020-02-13 DIAGNOSIS — M54.2 CERVICALGIA: ICD-10-CM

## 2020-02-13 PROCEDURE — 98941 CHIROPRACT MANJ 3-4 REGIONS: CPT | Mod: AT | Performed by: CHIROPRACTOR

## 2020-03-02 ENCOUNTER — OFFICE VISIT (OUTPATIENT)
Dept: CHIROPRACTIC MEDICINE | Facility: OTHER | Age: 60
End: 2020-03-02
Attending: CHIROPRACTOR
Payer: COMMERCIAL

## 2020-03-02 DIAGNOSIS — M54.2 CERVICALGIA: ICD-10-CM

## 2020-03-02 DIAGNOSIS — M99.01 SEGMENTAL AND SOMATIC DYSFUNCTION OF CERVICAL REGION: Primary | ICD-10-CM

## 2020-03-02 DIAGNOSIS — M99.02 SEGMENTAL AND SOMATIC DYSFUNCTION OF THORACIC REGION: ICD-10-CM

## 2020-03-02 DIAGNOSIS — M99.03 SEGMENTAL AND SOMATIC DYSFUNCTION OF LUMBAR REGION: ICD-10-CM

## 2020-03-02 PROCEDURE — 98941 CHIROPRACT MANJ 3-4 REGIONS: CPT | Mod: AT | Performed by: CHIROPRACTOR

## 2020-03-02 NOTE — PROGRESS NOTES
Subjective Finding:    Chief compalint: Patient presents with:  Back Pain  Neck Pain  , Pain Scale: 7/10, Intensity: sharp, Duration: 2 months, Radiating: no.    Date of injury:     Activities that the pain restricts:   Home/household/hobbies/social activities: yes.  Work duties: yes.  Sleep: yes.  Makes symptoms better: rest.  Makes symptoms worse: cervical extension and cervical flexion.  Have you seen anyone else for the symptoms? No.  Work related: no.  Automobile related injury: no.    Objective and Assessment:    Posture Analysis:   High shoulder: .  Head tilt: .  High iliac crest: .  Head carriage: forward.  Thoracic Kyphosis: neutral.  Lumbar Lordosis: neutral.    Lumbar Range of Motion: .  Cervical Range of Motion: extension decreased, left lateral flexion decreased and right lateral flexion decreased.  Thoracic Range of Motion: .  Extremity Range of Motion: .    Palpation:   Traps: sharp pain, referred pain: no    Segmental dysfunction pre-treatment and treatment area: C2, C5, C6 and T2.  L5    Assessment post-treatment:  Cervical: ROM increased.  Thoracic: ROM increased.  Lumbar: .    Comments: .      Complicating Factors: .    Procedure(s):  CMT:  76941 Chiropractic manipulative treatment 1-2 regions performed   Cervical: Diversified, See above for level, Supine and Thoracic: Diversified, See above for level, Prone    Modalities:  None performed this visit    Therapeutic procedures:  None    Plan:  Treatment plan: PRN.  Instructed patient: stretch as instructed at visit.  Short term goals: reduce pain.  Long term goals: restore normal function.  Prognosis: good.

## 2020-03-04 ENCOUNTER — OFFICE VISIT (OUTPATIENT)
Dept: FAMILY MEDICINE | Facility: OTHER | Age: 60
End: 2020-03-04
Attending: FAMILY MEDICINE
Payer: COMMERCIAL

## 2020-03-04 ENCOUNTER — TELEPHONE (OUTPATIENT)
Dept: FAMILY MEDICINE | Facility: OTHER | Age: 60
End: 2020-03-04

## 2020-03-04 VITALS
SYSTOLIC BLOOD PRESSURE: 120 MMHG | HEART RATE: 84 BPM | BODY MASS INDEX: 32.71 KG/M2 | WEIGHT: 228 LBS | DIASTOLIC BLOOD PRESSURE: 84 MMHG | TEMPERATURE: 97.1 F | OXYGEN SATURATION: 95 %

## 2020-03-04 DIAGNOSIS — J32.9 SINUSITIS, UNSPECIFIED CHRONICITY, UNSPECIFIED LOCATION: Primary | ICD-10-CM

## 2020-03-04 DIAGNOSIS — J32.0 LEFT MAXILLARY SINUSITIS: Primary | ICD-10-CM

## 2020-03-04 PROCEDURE — 99213 OFFICE O/P EST LOW 20 MIN: CPT | Performed by: FAMILY MEDICINE

## 2020-03-04 PROCEDURE — G0463 HOSPITAL OUTPT CLINIC VISIT: HCPCS

## 2020-03-04 RX ORDER — AMOXICILLIN 875 MG
875 TABLET ORAL 2 TIMES DAILY
Qty: 20 TABLET | Refills: 0 | Status: SHIPPED | OUTPATIENT
Start: 2020-03-04 | End: 2020-03-24

## 2020-03-04 RX ORDER — FLUTICASONE PROPIONATE 50 MCG
1 SPRAY, SUSPENSION (ML) NASAL DAILY
Qty: 16 G | Refills: 1 | Status: SHIPPED | OUTPATIENT
Start: 2020-03-04 | End: 2021-10-05

## 2020-03-04 ASSESSMENT — PAIN SCALES - GENERAL: PAINLEVEL: MILD PAIN (2)

## 2020-03-04 NOTE — TELEPHONE ENCOUNTER
It is a common side effect.  Change to plain amox which I just sent.  F/u with ongoing concerns.     fall precautions

## 2020-03-04 NOTE — NURSING NOTE
"Chief Complaint   Patient presents with     URI       Initial /84   Pulse 84   Temp 97.1  F (36.2  C) (Tympanic)   Wt 103.4 kg (228 lb)   SpO2 95%   BMI 32.71 kg/m   Estimated body mass index is 32.71 kg/m  as calculated from the following:    Height as of 11/21/19: 1.778 m (5' 10\").    Weight as of this encounter: 103.4 kg (228 lb).  Medication Reconciliation: complete  Ana Li MA    "

## 2020-03-04 NOTE — PROGRESS NOTES
Subjective     Villa Ruiz is a 59 year old male who presents to clinic today for the following health issues:    HPI   RESPIRATORY SYMPTOMS      Duration: about 5 days    Description  nasal congestion, rhinorrhea, sore throat, facial pain/pressure, cough, wheezing, ear pain left and headache    Severity: moderate    Accompanying signs and symptoms: ear is painful and plugged. Has issues with sinus.     History (predisposing factors):  none    Precipitating or alleviating factors: None    Therapies tried and outcome:  Ibuprofen and left over hydrocodone     PROBLEMS TO ADD ON...    Patient Active Problem List   Diagnosis     ACP (advance care planning)     History reviewed. No pertinent surgical history.    Social History     Tobacco Use     Smoking status: Current Every Day Smoker     Packs/day: 1.00     Years: 6.00     Pack years: 6.00     Types: Cigarettes, Cigars     Start date: 1/1/2012     Smokeless tobacco: Never Used     Tobacco comment: pt denied QP 11/21/19   Substance Use Topics     Alcohol use: No     Alcohol/week: 0.0 standard drinks     Family History   Problem Relation Age of Onset     Diabetes Mother          Current Outpatient Medications   Medication Sig Dispense Refill     amoxicillin-clavulanate (AUGMENTIN) 875-125 MG tablet Take 1 tablet by mouth 2 times daily for 10 days 20 tablet 0     diclofenac (VOLTAREN) 1 % topical gel Apply topically daily as needed  11     fluticasone (FLONASE) 50 MCG/ACT nasal spray Spray 1 spray into both nostrils daily 16 g 1     loratadine (CLARITIN) 10 MG capsule Take 10 mg by mouth daily 30 capsule 1     fluticasone (FLONASE) 50 MCG/ACT nasal spray Use 2 sprays to each nostril twice a day for 2 weeks, then use daily. (Patient not taking: Reported on 3/4/2020) 16 g 11     No Known Allergies    PROBLEMS TO ADD ON...  Reviewed and updated as needed this visit by Provider         Review of Systems   ROS COMP: Constitutional, HEENT, cardiovascular, pulmonary, gi and gu  "systems are negative, except as otherwise noted.      Objective    There were no vitals taken for this visit.  There is no height or weight on file to calculate BMI.  Physical Exam   GENERAL: healthy, alert and no distress  EYES: Eyes grossly normal to inspection, PERRL and conjunctivae and sclerae normal  HENT: normal cephalic/atraumatic, right ear: clear effusion, left ear: clear effusion, nose and mouth without ulcers or lesions, oropharynx clear and oral mucous membranes moist  NECK: no adenopathy, no asymmetry, masses, or scars and thyroid normal to palpation  RESP: lungs clear to auscultation - no rales, rhonchi or wheezes  CV: regular rate and rhythm, normal S1 S2, no S3 or S4, no murmur, click or rub, no peripheral edema and peripheral pulses strong  ABDOMEN: soft, nontender, no hepatosplenomegaly, no masses and bowel sounds normal  MS: no gross musculoskeletal defects noted, no edema    Diagnostic Test Results:  Labs reviewed in Epic        Assessment & Plan       ICD-10-CM    1. Left maxillary sinusitis J32.0 fluticasone (FLONASE) 50 MCG/ACT nasal spray     amoxicillin-clavulanate (AUGMENTIN) 875-125 MG tablet      augmentin and restart flonase.  F/u with ongoing concerns.  He understands.    Tobacco Cessation:   reports that he has been smoking cigarettes and cigars. He started smoking about 8 years ago. He has a 6.00 pack-year smoking history. He has never used smokeless tobacco.        BMI:   Estimated body mass index is 32.71 kg/m  as calculated from the following:    Height as of 11/21/19: 1.778 m (5' 10\").    Weight as of this encounter: 103.4 kg (228 lb).               No follow-ups on file.    Judah Moody MD  [facility]      "

## 2020-03-04 NOTE — TELEPHONE ENCOUNTER
"Pt called, was seen today for sinusitis and prescribed augmentin. Pt reports that he took first dose at approx 10:30 and started vomiting at 12. Has vomited 3-4 times total. Pt states that he took med with \"some snacks.\" Pt states that he read in the med insert that this could be a common side effect. Please advise. Thank you!    "

## 2020-03-16 ENCOUNTER — TRANSFERRED RECORDS (OUTPATIENT)
Dept: HEALTH INFORMATION MANAGEMENT | Facility: CLINIC | Age: 60
End: 2020-03-16

## 2020-03-16 LAB — COLOGUARD-ABSTRACT: POSITIVE

## 2020-03-24 ENCOUNTER — TELEPHONE (OUTPATIENT)
Dept: FAMILY MEDICINE | Facility: OTHER | Age: 60
End: 2020-03-24

## 2020-03-24 ENCOUNTER — VIRTUAL VISIT (OUTPATIENT)
Dept: FAMILY MEDICINE | Facility: OTHER | Age: 60
End: 2020-03-24
Attending: FAMILY MEDICINE
Payer: COMMERCIAL

## 2020-03-24 ENCOUNTER — TELEPHONE (OUTPATIENT)
Dept: SURGERY | Facility: OTHER | Age: 60
End: 2020-03-24

## 2020-03-24 DIAGNOSIS — R19.5 POSITIVE COLORECTAL CANCER SCREENING USING COLOGUARD TEST: Primary | ICD-10-CM

## 2020-03-24 DIAGNOSIS — J32.0 LEFT MAXILLARY SINUSITIS: Primary | ICD-10-CM

## 2020-03-24 PROCEDURE — 99441 ZZC PHYSICIAN TELEPHONE EVALUATION 5-10 MIN: CPT | Performed by: FAMILY MEDICINE

## 2020-03-24 RX ORDER — LEVOFLOXACIN 500 MG/1
500 TABLET, FILM COATED ORAL DAILY
Qty: 7 TABLET | Refills: 0 | Status: SHIPPED | OUTPATIENT
Start: 2020-03-24 | End: 2020-05-04

## 2020-03-24 NOTE — TELEPHONE ENCOUNTER
colonReferral received for colonoscopy for POSITIVE COLOGUARD.   This patient was approved by surgery education nurses for meet and greet colonoscopy and will not need a preop or consult appointment.   1st attempt to call patient to schedule. No answer. Left message for patient to return call.    City: [place]  Phone number: [phone]  Insurance: [facility]  Pharmacy: [facility]

## 2020-03-24 NOTE — TELEPHONE ENCOUNTER
"Patient called and stated he is still having a \"pinging headache\" in his left ear and some pain. Patient states he feels like it needs to drain. Patient is requesting to see provider. Patient states this has been going on for a very long time. 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  Patient can be reached at [phone]. Please advise.   "

## 2020-03-24 NOTE — TELEPHONE ENCOUNTER
Pt was notified of Cologuard results which was positive.  Referral made to general surgery per Dr Moody.

## 2020-03-24 NOTE — PROGRESS NOTES
"Villa Ruiz is a 59 year old male who is being evaluated via a billable telephone visit.      The patient has been notified of following:     \"This telephone visit will be conducted via a call between you and your physician/provider. We have found that certain health care needs can be provided without the need for a physical exam.  This service lets us provide the care you need with a short phone conversation.  If a prescription is necessary we can send it directly to your pharmacy.  If lab work is needed we can place an order for that and you can then stop by our lab to have the test done at a later time.    If during the course of the call the physician/provider feels a telephone visit is not appropriate, you will not be charged for this service.\"     Villa Ruiz complains of    Chief Complaint   Patient presents with     RECHECK     sinus problem-finished amoxicillin, still having left ear pain        I have reviewed and updated the patient's Past Medical History, Social History, Family History and Medication List.    ALLERGIES  Patient has no known allergies.      Additional provider notes: amox helped for sure, but pain returned.  Left ear is focus, along with left sinus as well.  Had vomiting with augmentin so we switched to amox.  When he was vomiting he developed cough.  He is not sob, but having trouble breathing into his breathalizer that he has to use for his car.      Assessment/Plan:  1. Left maxillary sinusitis  Reviewed.  Failed amox.  Nausea/vomiting with augmentin.  Levaquin.  Discussed lungs as well.  He is ok right now and the levaquin would tx an aspiration overall.  He will report any worsening promptly.  F/u with ongoing concerns.   - levofloxacin (LEVAQUIN) 500 MG tablet; Take 1 tablet (500 mg) by mouth daily  Dispense: 7 tablet; Refill: 0    Phone call duration:  10 minutes    Judah Moody MD    "

## 2020-05-04 ENCOUNTER — VIRTUAL VISIT (OUTPATIENT)
Dept: FAMILY MEDICINE | Facility: OTHER | Age: 60
End: 2020-05-04
Attending: FAMILY MEDICINE
Payer: COMMERCIAL

## 2020-05-04 ENCOUNTER — NURSE TRIAGE (OUTPATIENT)
Dept: FAMILY MEDICINE | Facility: OTHER | Age: 60
End: 2020-05-04

## 2020-05-04 DIAGNOSIS — J98.01 ACUTE BRONCHOSPASM: Primary | ICD-10-CM

## 2020-05-04 PROCEDURE — 99441 ZZC PHYSICIAN TELEPHONE EVALUATION 5-10 MIN: CPT | Performed by: FAMILY MEDICINE

## 2020-05-04 RX ORDER — ALBUTEROL SULFATE 90 UG/1
2 AEROSOL, METERED RESPIRATORY (INHALATION) EVERY 6 HOURS
Qty: 1 INHALER | Refills: 1 | Status: SHIPPED | OUTPATIENT
Start: 2020-05-04 | End: 2020-08-24

## 2020-05-04 NOTE — TELEPHONE ENCOUNTER
"Protocol advises patient to be seen within 4 hours. Patient scheduled for a telephone visit today.     Reason for Disposition    [1] MILD difficulty breathing (e.g., minimal/no SOB at rest, SOB with walking, pulse <100) AND [2] NEW-onset or WORSE than normal    Additional Information    Negative: [1] Breathing stopped AND [2] hasn't returned    Negative: Choking on something    Negative: Severe difficulty breathing (e.g., struggling for each breath, speaks in single words)    Negative: Bluish (or gray) lips or face now    Negative: Difficult to awaken or acting confused (e.g., disoriented, slurred speech)    Negative: Passed out (i.e., lost consciousness, collapsed and was not responding)    Negative: Wheezing started suddenly after medicine, an allergic food or bee sting    Negative: Stridor    Negative: Slow, shallow and weak breathing    Negative: Sounds like a life-threatening emergency to the triager    Negative: Chest pain    Negative: [1] Wheezing (high pitched whistling sound) AND [2] previous asthma attacks or use of asthma medicines    Negative: [1] Difficulty breathing AND [2] only present when coughing    Negative: [1] Difficulty breathing AND [2] only from stuffy or runny nose    Negative: [1] MODERATE difficulty breathing (e.g., speaks in phrases, SOB even at rest, pulse 100-120) AND [2] NEW-onset or WORSE than normal    Negative: Wheezing can be heard across the room    Negative: Drooling or spitting out saliva (because can't swallow)    Negative: History of prior \"blood clot\" in leg or lungs (i.e., deep vein thrombosis, pulmonary embolism)    Negative: History of inherited increased risk of blood clots (e.g., Factor 5 Leiden, Anti-thrombin 3, Protein C or Protein S deficiency, Prothrombin mutation)    Negative: Recent illness requiring prolonged bedrest (i.e., immobilization)    Negative: Hip or leg fracture in past 2 months (e.g., had cast on leg or ankle)    Negative: Major surgery in the past " "month    Negative: Recent long-distance travel with prolonged time in car, bus, plane, or train (i.e., within past 2 weeks; 6 or  more hours duration)    Negative: Extra heart beats OR irregular heart beating   (i.e., \"palpitations\")    Negative: Fever > 103 F (39.4 C)    Negative: [1] Fever > 101 F (38.3 C) AND [2] age > 60    Negative: [1] Fever > 100.0 F (37.8 C) AND [2] bedridden (e.g., nursing home patient, CVA, chronic illness, recovering from surgery)    Negative: [1] Fever > 100.0 F (37.8 C) AND [2] diabetes mellitus or weak immune system (e.g., HIV positive, cancer chemo, splenectomy, chronic steroids)    Negative: [1] Periods where breathing stops and then resumes normally AND [2] bedridden (e.g., nursing home patient, CVA)    Negative: Pregnant or postpartum (< 1 month since delivery)    Negative: Patient sounds very sick or weak to the triager    Negative: [1] Longstanding difficulty breathing (e.g., CHF, COPD, emphysema) AND [2] WORSE than normal    Negative: [1] Longstanding difficulty breathing AND [2] not responding to usual therapy    Negative: [1] Continuous (nonstop) coughing AND [2] keeps from working or sleeping    Negative: [1] MODERATE longstanding difficulty breathing (e.g., speaks in phrases, SOB even at rest, pulse 100-120) AND [2] SAME as normal    Answer Assessment - Initial Assessment Questions  1. RESPIRATORY STATUS: \"Describe your breathing?\" (e.g., wheezing, shortness of breath, unable to speak, severe coughing)       Shortness of breath, can't get a deep breath in  2. ONSET: \"When did this breathing problem begin?\"       Since March. Shortness of breath in the morning  3. PATTERN \"Does the difficult breathing come and go, or has it been constant since it started?\"      Comes and goes. Worse in the morning and if I walk.   4. SEVERITY: \"How bad is your breathing?\" (e.g., mild, moderate, severe)     - MILD: No SOB at rest, mild SOB with walking, speaks normally in sentences, can lay " "down, no retractions, pulse < 100.     - MODERATE: SOB at rest, SOB with minimal exertion and prefers to sit, cannot lie down flat, speaks in phrases, mild retractions, audible wheezing, pulse 100-120.     - SEVERE: Very SOB at rest, speaks in single words, struggling to breathe, sitting hunched forward, retractions, pulse > 120       Mild  5. RECURRENT SYMPTOM: \"Have you had difficulty breathing before?\" If so, ask: \"When was the last time?\" and \"What happened that time?\"       No  6. CARDIAC HISTORY: \"Do you have any history of heart disease?\" (e.g., heart attack, angina, bypass surgery, angioplasty)       No  7. LUNG HISTORY: \"Do you have any history of lung disease?\"  (e.g., pulmonary embolus, asthma, emphysema)      No  8. CAUSE: \"What do you think is causing the breathing problem?\"       Coronavirus  9. OTHER SYMPTOMS: \"Do you have any other symptoms? (e.g., dizziness, runny nose, cough, chest pain, fever)      No  10. PREGNANCY: \"Is there any chance you are pregnant?\" \"When was your last menstrual period?\"        No  11. TRAVEL: \"Have you traveled out of the country in the last month?\" (e.g., travel history, exposures)        No    Protocols used: BREATHING DIFFICULTY-A-AH      "

## 2020-05-04 NOTE — PROGRESS NOTES
"Villa Ruiz is a 59 year old male who is being evaluated via a billable telephone visit.      The patient has been notified of following:     \"This telephone visit will be conducted via a call between you and your physician/provider. We have found that certain health care needs can be provided without the need for a physical exam.  This service lets us provide the care you need with a short phone conversation.  If a prescription is necessary we can send it directly to your pharmacy.  If lab work is needed we can place an order for that and you can then stop by our lab to have the test done at a later time.    Telephone visits are billed at different rates depending on your insurance coverage. During this emergency period, for some insurers they may be billed the same as an in-person visit.  Please reach out to your insurance provider with any questions.    If during the course of the call the physician/provider feels a telephone visit is not appropriate, you will not be charged for this service.\"    Patient has given verbal consent for Telephone visit?  Yes    What phone number would you like to be contacted at? [phone]    How would you like to obtain your AVS? Declined    Subjective     Villa Ruiz is a 59 year old male who presents to clinic today for the following health issues:    HPI  Shortness of breath      Duration: 2 weeks     Description (location/character/radiation): breathing problem     Intensity:  moderate    Accompanying signs and symptoms: SOB with exertion and in the morning    History (similar episodes/previous evaluation): None    Precipitating or alleviating factors: None    Therapies tried and outcome: None          PROBLEMS TO ADD ON...lengthy review.  Had a respiratory illness about a month ago he is convinced was Covid.  He got better but still some sob with exertion.  Absolutely no chest pain. Feels it's still in his left lung a bit.  Still smoking.  We reviewed risks, etc.  "     Patient Active Problem List   Diagnosis     ACP (advance care planning)     No past surgical history on file.    Social History     Tobacco Use     Smoking status: Current Every Day Smoker     Packs/day: 1.00     Years: 6.00     Pack years: 6.00     Types: Cigarettes, Cigars     Start date: 1/1/2012     Smokeless tobacco: Never Used     Tobacco comment: pt denied QP 11/21/19   Substance Use Topics     Alcohol use: No     Alcohol/week: 0.0 standard drinks     Family History   Problem Relation Age of Onset     Diabetes Mother          Current Outpatient Medications   Medication Sig Dispense Refill     albuterol (PROAIR HFA/PROVENTIL HFA/VENTOLIN HFA) 108 (90 Base) MCG/ACT inhaler Inhale 2 puffs into the lungs every 6 hours 1 Inhaler 1     diclofenac (VOLTAREN) 1 % topical gel Apply topically daily as needed  11     fluticasone (FLONASE) 50 MCG/ACT nasal spray Spray 1 spray into both nostrils daily (Patient not taking: Reported on 5/4/2020) 16 g 1     loratadine (CLARITIN) 10 MG capsule Take 10 mg by mouth daily (Patient not taking: Reported on 5/4/2020) 30 capsule 1     Allergies   Allergen Reactions     Augmentin Nausea and Vomiting       Reviewed and updated as needed this visit by Provider         Review of Systems   ROS COMP: Constitutional, HEENT, cardiovascular, pulmonary, gi and gu systems are negative, except as otherwise noted.       Objective   Reported vitals:  There were no vitals taken for this visit.   healthy, alert and no distress  PSYCH: Alert and oriented times 3; coherent speech, normal   rate and volume, able to articulate logical thoughts, able   to abstract reason, no tangential thoughts, no hallucinations   or delusions  His affect is normal  RESP: No cough, no audible wheezing, able to talk in full sentences  Remainder of exam unable to be completed due to telephone visits    Diagnostic Test Results:  Labs reviewed in Epic        Assessment/Plan:  1. Acute bronchospasm  Presumed.  In a  smoker assume some copd.  I stressed I don't know for sure it's his lungs and we have to worry about his heart.  He understands.  He will watch closely and report as well if this doesn't work quite well.  We may need to workup his sx with cardiac and imaging.  He understands.  Try the albuterol and get back to us with any ongoing concerns.  ER with any chest pain, etc.    - albuterol (PROAIR HFA/PROVENTIL HFA/VENTOLIN HFA) 108 (90 Base) MCG/ACT inhaler; Inhale 2 puffs into the lungs every 6 hours  Dispense: 1 Inhaler; Refill: 1    No follow-ups on file.      Phone call duration:  7 minutes    Judah Moody MD

## 2020-05-13 ENCOUNTER — TELEPHONE (OUTPATIENT)
Dept: FAMILY MEDICINE | Facility: OTHER | Age: 60
End: 2020-05-13

## 2020-06-16 ENCOUNTER — VIRTUAL VISIT (OUTPATIENT)
Dept: FAMILY MEDICINE | Facility: OTHER | Age: 60
End: 2020-06-16
Attending: PHYSICIAN ASSISTANT
Payer: COMMERCIAL

## 2020-06-16 ENCOUNTER — TELEPHONE (OUTPATIENT)
Dept: FAMILY MEDICINE | Facility: OTHER | Age: 60
End: 2020-06-16

## 2020-06-16 VITALS — WEIGHT: 220 LBS | BODY MASS INDEX: 31.57 KG/M2

## 2020-06-16 DIAGNOSIS — H93.8X2 PLUGGED FEELING IN EAR, LEFT: Primary | ICD-10-CM

## 2020-06-16 PROCEDURE — 99442 ZZC PHYSICIAN TELEPHONE EVALUATION 11-20 MIN: CPT | Performed by: PHYSICIAN ASSISTANT

## 2020-06-16 ASSESSMENT — PAIN SCALES - GENERAL: PAINLEVEL: NO PAIN (0)

## 2020-06-16 NOTE — TELEPHONE ENCOUNTER
1:46 PM    Reason for Call: Phone Call    Description: pt states his ear is plugged again and wondering if he can get drops or does he have to see ENT/ lease advise    Was an appointment offered for this call? No  If yes : Appointment type              Date    Preferred method for responding to this message: Telephone Call  What is your phone number ? [phone]    If we cannot reach you directly, may we leave a detailed response at the number you provided? Yes    Can this message wait until your PCP/provider returns, if available today? YES, No would like a call back    Asia Pinedo

## 2020-06-16 NOTE — PROGRESS NOTES
"Villa Ruiz is a 59 year old male who is being evaluated via a billable telephone visit.      The patient has been notified of following:     \"This telephone visit will be conducted via a call between you and your physician/provider. We have found that certain health care needs can be provided without the need for a physical exam.  This service lets us provide the care you need with a short phone conversation.  If a prescription is necessary we can send it directly to your pharmacy.  If lab work is needed we can place an order for that and you can then stop by our lab to have the test done at a later time.    Telephone visits are billed at different rates depending on your insurance coverage. During this emergency period, for some insurers they may be billed the same as an in-person visit.  Please reach out to your insurance provider with any questions.    If during the course of the call the physician/provider feels a telephone visit is not appropriate, you will not be charged for this service.\"    Patient has given verbal consent for Telephone visit?  Yes    What phone number would you like to be contacted at? [phone]    How would you like to obtain your AVS? Declined    Subjective     Villa Ruiz is a 59 year old male who presents via phone visit today for the following health issues: History of eustachian tube dysfunction    HPI  Ear pain      Duration: 2-3 months    Description (location/character/radiation): left ear    Intensity:  moderate    Accompanying signs and symptoms: ears feel plugged and painful, tingling    History (similar episodes/previous evaluation): None    Precipitating or alleviating factors: None    Therapies tried and outcome: None              Patient Active Problem List   Diagnosis     ACP (advance care planning)     No past surgical history on file.    Social History     Tobacco Use     Smoking status: Current Every Day Smoker     Packs/day: 1.00     Years: 6.00     Pack years: " 6.00     Types: Cigarettes, Cigars     Start date: 1/1/2012     Smokeless tobacco: Never Used     Tobacco comment: pt denied QP 11/21/19   Substance Use Topics     Alcohol use: No     Alcohol/week: 0.0 standard drinks     Family History   Problem Relation Age of Onset     Diabetes Mother          Current Outpatient Medications   Medication Sig Dispense Refill     albuterol (PROAIR HFA/PROVENTIL HFA/VENTOLIN HFA) 108 (90 Base) MCG/ACT inhaler Inhale 2 puffs into the lungs every 6 hours 1 Inhaler 1     diclofenac (VOLTAREN) 1 % topical gel Apply topically daily as needed  11     fluticasone (FLONASE) 50 MCG/ACT nasal spray Spray 1 spray into both nostrils daily (Patient not taking: Reported on 5/4/2020) 16 g 1     loratadine (CLARITIN) 10 MG capsule Take 10 mg by mouth daily (Patient not taking: Reported on 5/4/2020) 30 capsule 1     Allergies   Allergen Reactions     Augmentin Nausea and Vomiting       Reviewed and updated as needed this visit by Provider         Review of Systems   Constitutional, HEENT, cardiovascular, pulmonary, gi and gu systems are negative, except as otherwise noted.       Objective   Reported vitals:  There were no vitals taken for this visit.   healthy, alert and no distress  PSYCH: Alert and oriented times 3; coherent speech, normal   rate and volume, able to articulate logical thoughts, able   to abstract reason, no tangential thoughts, no hallucinations   or delusions  His affect is normal  RESP: No cough, no audible wheezing, able to talk in full sentences  Remainder of exam unable to be completed due to telephone visits            Assessment/Plan:  (H93.8X2) Plugged feeling in ear, left  (primary encounter diagnosis)  Comment: Refer back to ENT  Plan: OTOLARYNGOLOGY REFERRAL                No follow-ups on file.      Phone call duration:  11 minutes    SHABBIR Tan

## 2020-06-17 DIAGNOSIS — H91.93 DECREASED HEARING OF BOTH EARS: Primary | ICD-10-CM

## 2020-06-23 ENCOUNTER — OFFICE VISIT (OUTPATIENT)
Dept: OTOLARYNGOLOGY | Facility: OTHER | Age: 60
End: 2020-06-23
Attending: AUDIOLOGIST
Payer: COMMERCIAL

## 2020-06-23 ENCOUNTER — OFFICE VISIT (OUTPATIENT)
Dept: AUDIOLOGY | Facility: OTHER | Age: 60
End: 2020-06-23
Attending: AUDIOLOGIST
Payer: COMMERCIAL

## 2020-06-23 VITALS
TEMPERATURE: 97.7 F | SYSTOLIC BLOOD PRESSURE: 130 MMHG | OXYGEN SATURATION: 95 % | WEIGHT: 230 LBS | DIASTOLIC BLOOD PRESSURE: 80 MMHG | HEART RATE: 80 BPM | BODY MASS INDEX: 33 KG/M2

## 2020-06-23 DIAGNOSIS — H69.91 DYSFUNCTION OF RIGHT EUSTACHIAN TUBE: ICD-10-CM

## 2020-06-23 DIAGNOSIS — M26.622 TMJ TENDERNESS, LEFT: ICD-10-CM

## 2020-06-23 DIAGNOSIS — H92.02 OTALGIA, LEFT: Primary | ICD-10-CM

## 2020-06-23 DIAGNOSIS — H90.3 SENSORINEURAL HEARING LOSS (SNHL) OF BOTH EARS: Primary | ICD-10-CM

## 2020-06-23 DIAGNOSIS — H91.93 DECREASED HEARING OF BOTH EARS: ICD-10-CM

## 2020-06-23 PROCEDURE — 92557 COMPREHENSIVE HEARING TEST: CPT | Performed by: AUDIOLOGIST

## 2020-06-23 PROCEDURE — 99213 OFFICE O/P EST LOW 20 MIN: CPT | Mod: 25 | Performed by: NURSE PRACTITIONER

## 2020-06-23 PROCEDURE — G0463 HOSPITAL OUTPT CLINIC VISIT: HCPCS | Mod: 25

## 2020-06-23 PROCEDURE — 92550 TYMPANOMETRY & REFLEX THRESH: CPT | Performed by: AUDIOLOGIST

## 2020-06-23 PROCEDURE — 92504 EAR MICROSCOPY EXAMINATION: CPT | Performed by: NURSE PRACTITIONER

## 2020-06-23 PROCEDURE — 92504 EAR MICROSCOPY EXAMINATION: CPT

## 2020-06-23 ASSESSMENT — PAIN SCALES - GENERAL: PAINLEVEL: MILD PAIN (3)

## 2020-06-23 NOTE — PROGRESS NOTES
"Otolaryngology Note         Chief Complaint:     Patient presents with:  Cerumen Impaction: plugged feeling in left ear           History of Present Illness:     Villa Ruiz is a 60 year old male seen today for left ear pain. He notes he has been feeling plugged off and on for the past 6 months.  This has mostly resolved now, and he has a feeling of a \"headache\" in his left ear.  He was seen back in ENT November with ETD, advised to use flonase and Claritin.   He noted worsening in March when he had URI, was treated with abx.      He notes it is worse on the left side when he lays on it.  He notes keeping the air out of the ear helps it.  He wears a ear plug when he rides 4 [address] to keep the wind out of it .  He reports the pain is on the super ramus of the mandible, worse with palpation.  He denies known bruxism but does report a history of diffuse arthritis.  He drinks 2 pots of coffee per day and does report that he likes to chew on beef jerky.  He is currently waiting to have all his upper teeth pulled and dentures on the tops, partial on the bottom    He does endorse q-tip use.      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    No current vertigo, had some disequilibrium in March when he was sick with sinusitis.  None currently.   No facial numbness or tingling.     No flux hearing  No c/o tinnitus or aural fullness.     Audiogram 6/23/2020:    Tympanograms are Type A for right ear suggesting normal eardrum mobility and Type AS left-reduced admittance..  Acoustic Reflex Thresholds at 1000 Hz are present for both ears.  Thresholds are normal (slight condutive right at 250 Hz) sloping to moderate sensorineural hearing loss [id number] high frequencies " compared to 2005.  Speech reception thresholds are in good agreement with pure tone average.  Word discrimination scores are excellent at supra-thresholds level.  There is slight asymmetry noted in the low tones, this is largely unchanged from 2005.          Medications:     Current Outpatient Rx   Medication Sig Dispense Refill     albuterol (PROAIR HFA/PROVENTIL HFA/VENTOLIN HFA) 108 (90 Base) MCG/ACT inhaler Inhale 2 puffs into the lungs every 6 hours 1 Inhaler 1     diclofenac (VOLTAREN) 1 % topical gel Apply topically daily as needed  11     fluticasone (FLONASE) 50 MCG/ACT nasal spray Spray 1 spray into both nostrils daily 16 g 1     loratadine (CLARITIN) 10 MG capsule Take 10 mg by mouth daily 30 capsule 1            Allergies:     Allergies: Augmentin          Past Medical History:     History reviewed. No pertinent past medical history.         Past Surgical History:     History reviewed. No pertinent surgical history.    ENT family history reviewed         Social History:     Social History     Tobacco Use     Smoking status: Current Every Day Smoker     Packs/day: 0.75     Years: 6.00     Pack years: 4.50     Types: Cigarettes, Cigars     Start date: 1/1/2012     Smokeless tobacco: Never Used     Tobacco comment: pt denied QP 6/23/20   Substance Use Topics     Alcohol use: No     Alcohol/week: 0.0 standard drinks     Drug use: No            Review of Systems:     ROS: See HPI         Physical Exam:     /80   Pulse 80   Temp 97.7  F (36.5  C) (Tympanic)   Wt 104.3 kg (230 lb)   SpO2 95%   BMI 33.00 kg/m      General - The patient is well nourished and well developed, and appears to have good nutritional status.  Alert and oriented to person and place, answers questions and cooperates with examination appropriately.   Head and Face - Normocephalic and atraumatic, with no gross asymmetry noted.  The facial nerve is intact, with strong symmetric movements.  Voice and Breathing - The patient was  breathing comfortably without the use of accessory muscles. There was no wheezing, stridor. The patients voice was clear and strong, and had appropriate pitch and quality.  Ears - The ears were examined with binocular microscopy and with otoscope.  External ear normal. Canals are patent. Right tympanic membrane is intact without effusion, retraction or mass. Left tympanic membrane is intact without effusion, retraction or mass.  Eyes - Extraocular movements intact, sclera were not icteric or injected  Mouth - Examination of the oral cavity showed pink, healthy oral mucosa. Dentition is poor condition but no obvious abscess noted.  No lesions or ulcerations noted. The tongue was mobile and midline.  There is tenderness to palpation on the superior ramus of the mandible on the left side, there is also tenderness to palpation over the left TMJ with click/pop noted with opening and closing the jaw.   Throat - The walls of the oropharynx were smooth, pink, moist, symmetric, and had no lesions or ulcerations.  The tonsillar pillars and soft palate were symmetric. The uvula was midline on elevation.    Neck - Normal midline excursion of the laryngotracheal complex during swallowing.  Full range of motion on passive movement.  Palpation of the occipital, submental, submandibular, internal jugular chain, and supraclavicular nodes did not demonstrate any abnormal lymph nodes or masses.  Palpation of the thyroid was soft and smooth, with no nodules or goiter appreciated.  The trachea was mobile and midline.  Nose - External contour is symmetric, no gross deflection or scars.  Nasal mucosa is pink and moist with no abnormal mucus.  The septum and turbinates were evaluated: grossly normal.  No polyps, masses, or purulence noted on examination.           Assessment and Plan:       ICD-10-CM    1. Otalgia, left  H92.02 PHYSICAL THERAPY REFERRAL   2. TMJ tenderness, left  M26.622 PHYSICAL THERAPY REFERRAL     TMJ disease can  usually be managed conservatively.  Recommendations include resting the muscles and joints by eating soft foods, not chewing gum, avoiding clenching or tensing the jaw, and relaxing muscles with moist heat for 1/2 hour at least, twice daily.  Physical therapy can be helpful as well as non-steroidal anti-inflammatory drugs.  Oral splints or mouth guards are often necessary.  If these steps are not helpful an oral surgeon may need to be contacted.    He would like to try PT for TMJ, he is hesitant to take OTC pain medications.      Veronica Chou NP-C  [facility]

## 2020-06-23 NOTE — NURSING NOTE
"Chief Complaint   Patient presents with     Cerumen Impaction     plugged feeling in left ear       Initial /80   Pulse 80   Temp 97.7  F (36.5  C) (Tympanic)   Wt 104.3 kg (230 lb)   SpO2 95%   BMI 33.00 kg/m   Estimated body mass index is 33 kg/m  as calculated from the following:    Height as of 11/21/19: 1.778 m (5' 10\").    Weight as of this encounter: 104.3 kg (230 lb).  Medication Reconciliation: complete  Marianela Miranda LPN  "

## 2020-06-23 NOTE — PROGRESS NOTES
Audiology Evaluation Completed. Please refer SCANNED AUDIOGRAM and/or TYMPANOGRAM for BACKGROUND, RESULTS, RECOMMENDATIONS.      Libia CAI, [facility]  Audiologist #[id number]

## 2020-06-23 NOTE — LETTER
"    6/23/2020         RE: Villa Ruiz  [address] [facility] [address]  [facility] [address]        Dear Colleague,    Thank you for referring your patient, Villa Ruiz, to the [facility]. Please see a copy of my visit note below.    Otolaryngology Note         Chief Complaint:     Patient presents with:  Cerumen Impaction: plugged feeling in left ear           History of Present Illness:     Villa Ruiz is a 60 year old male seen today for left ear pain. He notes he has been feeling plugged off and on for the past 6 months.  This has mostly resolved now, and he has a feeling of a \"headache\" in his left ear.  He was seen back in ENT November with ETD, advised to use flonase and Claritin.   He noted worsening in March when he had URI, was treated with abx.      He notes it is worse on the left side when he lays on it.  He notes keeping the air out of the ear helps it.  He wears a ear plug when he rides 4 [place] to keep the wind out of it .  He reports the pain is on the super ramus of the mandible, worse with palpation.  He denies known bruxism but does report a history of diffuse arthritis.  He drinks 2 pots of coffee per day and does report that he likes to chew on beef jerky.  He is currently waiting to have all his upper teeth pulled and dentures on the tops, partial on the bottom    He does endorse q-tip use.      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    No current vertigo, had some disequilibrium in March when he was sick with sinusitis.  None currently.   No facial numbness or tingling.     No flux hearing  No c/o tinnitus or aural fullness.     Audiogram 6/23/2020:    Tympanograms are Type A for right ear suggesting normal eardrum " mobility and Type AS left-reduced admittance..  Acoustic Reflex Thresholds at 1000 Hz are present for both ears.  Thresholds are normal (slight condutive right at 250 Hz) sloping to moderate sensorineural hearing loss [id number] high frequencies compared to 2005.  Speech reception thresholds are in good agreement with pure tone average.  Word discrimination scores are excellent at supra-thresholds level.  There is slight asymmetry noted in the low tones, this is largely unchanged from 2005.          Medications:     Current Outpatient Rx   Medication Sig Dispense Refill     albuterol (PROAIR HFA/PROVENTIL HFA/VENTOLIN HFA) 108 (90 Base) MCG/ACT inhaler Inhale 2 puffs into the lungs every 6 hours 1 Inhaler 1     diclofenac (VOLTAREN) 1 % topical gel Apply topically daily as needed  11     fluticasone (FLONASE) 50 MCG/ACT nasal spray Spray 1 spray into both nostrils daily 16 g 1     loratadine (CLARITIN) 10 MG capsule Take 10 mg by mouth daily 30 capsule 1            Allergies:     Allergies: Augmentin          Past Medical History:     History reviewed. No pertinent past medical history.         Past Surgical History:     History reviewed. No pertinent surgical history.    ENT family history reviewed         Social History:     Social History     Tobacco Use     Smoking status: Current Every Day Smoker     Packs/day: 0.75     Years: 6.00     Pack years: 4.50     Types: Cigarettes, Cigars     Start date: 1/1/2012     Smokeless tobacco: Never Used     Tobacco comment: pt denied QP 6/23/20   Substance Use Topics     Alcohol use: No     Alcohol/week: 0.0 standard drinks     Drug use: No            Review of Systems:     ROS: See HPI         Physical Exam:     /80   Pulse 80   Temp 97.7  F (36.5  C) (Tympanic)   Wt 104.3 kg (230 lb)   SpO2 95%   BMI 33.00 kg/m      General - The patient is well nourished and well developed, and appears to have good nutritional status.  Alert and oriented to person and  place, answers questions and cooperates with examination appropriately.   Head and Face - Normocephalic and atraumatic, with no gross asymmetry noted.  The facial nerve is intact, with strong symmetric movements.  Voice and Breathing - The patient was breathing comfortably without the use of accessory muscles. There was no wheezing, stridor. The patients voice was clear and strong, and had appropriate pitch and quality.  Ears - The ears were examined with binocular microscopy and with otoscope.  External ear normal. Canals are patent. Right tympanic membrane is intact without effusion, retraction or mass. Left tympanic membrane is intact without effusion, retraction or mass.  Eyes - Extraocular movements intact, sclera were not icteric or injected  Mouth - Examination of the oral cavity showed pink, healthy oral mucosa. Dentition is poor condition but no obvious abscess noted.  No lesions or ulcerations noted. The tongue was mobile and midline.  There is tenderness to palpation on the superior ramus of the mandible on the left side, there is also tenderness to palpation over the left TMJ with click/pop noted with opening and closing the jaw.   Throat - The walls of the oropharynx were smooth, pink, moist, symmetric, and had no lesions or ulcerations.  The tonsillar pillars and soft palate were symmetric. The uvula was midline on elevation.    Neck - Normal midline excursion of the laryngotracheal complex during swallowing.  Full range of motion on passive movement.  Palpation of the occipital, submental, submandibular, internal jugular chain, and supraclavicular nodes did not demonstrate any abnormal lymph nodes or masses.  Palpation of the thyroid was soft and smooth, with no nodules or goiter appreciated.  The trachea was mobile and midline.  Nose - External contour is symmetric, no gross deflection or scars.  Nasal mucosa is pink and moist with no abnormal mucus.  The septum and turbinates were evaluated: grossly  normal.  No polyps, masses, or purulence noted on examination.           Assessment and Plan:       ICD-10-CM    1. Otalgia, left  H92.02 PHYSICAL THERAPY REFERRAL   2. TMJ tenderness, left  M26.622 PHYSICAL THERAPY REFERRAL     TMJ disease can usually be managed conservatively.  Recommendations include resting the muscles and joints by eating soft foods, not chewing gum, avoiding clenching or tensing the jaw, and relaxing muscles with moist heat for 1/2 hour at least, twice daily.  Physical therapy can be helpful as well as non-steroidal anti-inflammatory drugs.  Oral splints or mouth guards are often necessary.  If these steps are not helpful an oral surgeon may need to be contacted.    He would like to try PT for TMJ, he is hesitant to take OTC pain medications.      Veronica GARCIA  [facility] ENT      Again, thank you for allowing me to participate in the care of your patient.        Sincerely,        Veronica Chou NP

## 2020-06-26 ENCOUNTER — HOSPITAL ENCOUNTER (OUTPATIENT)
Dept: PHYSICAL THERAPY | Facility: HOSPITAL | Age: 60
Setting detail: THERAPIES SERIES
End: 2020-06-26
Attending: NURSE PRACTITIONER
Payer: COMMERCIAL

## 2020-06-26 DIAGNOSIS — H92.02 OTALGIA, LEFT: ICD-10-CM

## 2020-06-26 DIAGNOSIS — M26.622 TMJ TENDERNESS, LEFT: ICD-10-CM

## 2020-06-26 PROCEDURE — 97110 THERAPEUTIC EXERCISES: CPT | Mod: GP

## 2020-06-26 PROCEDURE — 97162 PT EVAL MOD COMPLEX 30 MIN: CPT | Mod: GP

## 2020-06-26 NOTE — PROGRESS NOTES
Initial Physical Therapy Evaluations       Name: Villa Ruzi MRN# [id number]   Age: 60 year old YOB: 1960     Date of Consultation: June 26, 2020  Primary care provider: Judah Moody    Referring Physician: Veronica Chou NP  Orders: Eval and Treat  Medical Diagnosis: L TMJ with otalgia  Onset of Illness/Injury: Fall 2019    Reason for PT Visit: Patient is a 59 y/o male who presents with L sided ear pain/ with possible jaw issues. States that he has noticed some fullness in his L ear around [place] last season. No prior history of L sided ear pain prior. Has difficulty laying on his L side and airflow into his ear will cause more discomfort. Can pinpoint the discomfort.    Denies any grinding or clenching of his teeth at night. Does report some dental issues - reports that his dentist appointment was rescheduled secondary to COVID. Has noticed some clicking and popping in the L ear at times, but nothing consistent. Patient is retired from construction and carpentry work. Has not noticed any increase in pain with eating, chewing activities.  Prior Level of Function: no prior history of TMJ issues     Community Support/Living Environment/Employment History: retired     Patient/Family Goal: decrease pain    Fall Screen:   Have you fallen 2 or more times in the last year? No  Have you fallen and had an injury in the last year? No  Timed up & go:   Is patient a fall risk? No    Past Medical History:   No past medical history on file.    Past Surgical History:  No past surgical history on file.    Medications:   Current Outpatient Medications   Medication Sig     albuterol (PROAIR HFA/PROVENTIL HFA/VENTOLIN HFA) 108 (90 Base) MCG/ACT inhaler Inhale 2 puffs into the lungs every 6 hours     diclofenac (VOLTAREN) 1 % topical gel Apply topically daily as needed     fluticasone (FLONASE) 50 MCG/ACT nasal spray Spray 1 spray into both nostrils daily     loratadine (CLARITIN) 10 MG capsule Take  10 mg by mouth daily     No current facility-administered medications for this encounter.        Imaging: no imaging on facial structures or TMJ    Musculoskeletal Findings:     OBJECTIVE   Observation: Patient presents to department in no acute distress.     Palpation: tenderness with palpation to L sided ear area - just posterior to TMJ joint capsule    Posture: Forward head, protracted shoulders   Sitting Posture: Poor   Standing Posture: Fair    Range of Motion/Strength:   TMJ Range of Motion - therabite (mm)  Opening: mild limitation  Left Lateral Deviation: WNLs  Right Lateral Deviation: WNLs    Cervical Spine Range of Motion   Active Motion   Flexion 30    Extension 40    Rotation Right 45    Rotation Left 60    Right upper extremity range of motion: WNL   Left upper extremity range of motion: WNL     Deep Neck Flexor Endurance Test: 15 seconds prior to substitution    Special Tests:   Cervical Spine Tests  Passive Intervertebral Movement Testing:  Linda holman  Median Nerve Tension Test: NT  Radial Nerve Tension Test: NT  Ulnar Nerve Tension Test:NT       Outcome Measures:   TMD disability questionnaire - 8%    Prognosis/Plan of Care: Fair  Appropriate for Physical Therapy Intervention: Yes     GOALS:   Short-term goals:  To be achieved in 2-3 weeks:    Instruct in home program  1.) Patient will be independent with a short-term home exercise program.  2.) Patient will understand and demonstrate improved posture and techniques such that patient places less strain over cervical spine.  3.) Patient will report a 25% or greater improvement in symptoms in order to allow for increased comfort with activities of daily living.        Long-term goals:  To be achieved in 6-8 weeks:    Self management of symptoms.  Pain free activities of daily living.  1.) Improve score on TMD Disability Index by 50% to correlate with clinically significant change.  2.) Patient will report a 75% or greater improvement in symptoms in order  to allow for increased comfort with activities of daily living.       Discharge goals: Patient will be independent with a home program for self-management of symptoms.      Patient presents today with signs and symptoms consistent of L sided otalgia with possible associated of TMJ issues. Patient currently does not present with typical TMJ dysfunction. Therapy will trial manual therapy techniques, cervical spine treatment, postural strengthening, and activity modification to see if any changes can be made to L sided ear pain. Currently unsure if patient's symptoms are stemming from TMJ, but it is a possibility.     Treatment plan:  1.) Modalities including ultrasound, electrical stimulation, and mechanical traction as needed for pain management and increasing tissue extensibility  2.) Manual therapy to include cervical and thoracic spine joint and soft tissue mobilization for improved mobility and decreased pain  3.) Therapeutic exercise to consist of cervical stabilization and scapular strengthening and range of motion activities    Treatment Rendered/Intervention:   Evaluation completed as described above followed by discussion of exam findings and plan of care.    Therapeutic exercise: Patient instructed in and demonstrated proper performance of home exercise program consisting of:  Instructed patient in HEP: self myofascial L sided TMJ tissue work, postural cueing, chin tucks, scap sets, cryotherapy education    Manual therapy: L sided ear cartilage movement to improve tissue extensibility around ear    Educated in posture principles and neutral spine positioning. Patient was instructed in home use of heat and/or ice for pain management      Clinical Impressions:  Criteria for Skilled Therapeutic Intervention Met: Yes  PT Diagnosis: L sided TMJ dysfunction with L sided otalgia  Influenced by the following impairments: pain, headaches  Functional limitations due to impairment: difficulty concentrating, laying  down on L side  Clinical presentation: Evolving/Changing  Clinical presentation rationale: clinical judgement  Clinical Decision making (complexity): Moderate Complexity  Predicted Duration of Therapy Intervention (days/wks): 6-8 weeks  Risks and Benefits of therapy have been explained: Yes  Patient, Family & other staff in agreement with plan of care: Yes  Comments: date range: 6/26/20 to 9/24/20      Total Evaluation Time: 50 minutes

## 2020-07-31 ENCOUNTER — TELEPHONE (OUTPATIENT)
Dept: SURGERY | Facility: OTHER | Age: 60
End: 2020-07-31

## 2020-07-31 NOTE — TELEPHONE ENCOUNTER
Called pt. To set up meet and greet. Pt. Wants to hold off on scheduling, he found a tick lodged in his leg and wants to get that taken care of first. Please call back at later date to schedule colonoscopy.

## 2020-08-04 ENCOUNTER — OFFICE VISIT (OUTPATIENT)
Dept: FAMILY MEDICINE | Facility: OTHER | Age: 60
End: 2020-08-04
Attending: PHYSICIAN ASSISTANT
Payer: COMMERCIAL

## 2020-08-04 VITALS
HEART RATE: 83 BPM | DIASTOLIC BLOOD PRESSURE: 72 MMHG | WEIGHT: 223 LBS | BODY MASS INDEX: 31.92 KG/M2 | HEIGHT: 70 IN | OXYGEN SATURATION: 97 % | TEMPERATURE: 97.2 F | SYSTOLIC BLOOD PRESSURE: 140 MMHG
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
## 2020-08-04 PROCEDURE — 99213 OFFICE O/P EST LOW 20 MIN: CPT | Performed by: PHYSICIAN ASSISTANT

## 2020-08-04 PROCEDURE — G0463 HOSPITAL OUTPT CLINIC VISIT: HCPCS

## 2020-08-04 ASSESSMENT — ANXIETY QUESTIONNAIRES
2. NOT BEING ABLE TO STOP OR CONTROL WORRYING: NOT AT ALL
4. TROUBLE RELAXING: NOT AT ALL
6. BECOMING EASILY ANNOYED OR IRRITABLE: NOT AT ALL
5. BEING SO RESTLESS THAT IT IS HARD TO SIT STILL: NOT AT ALL
GAD7 TOTAL SCORE: 0
3. WORRYING TOO MUCH ABOUT DIFFERENT THINGS: NOT AT ALL
7. FEELING AFRAID AS IF SOMETHING AWFUL MIGHT HAPPEN: NOT AT ALL
1. FEELING NERVOUS, ANXIOUS, OR ON EDGE: NOT AT ALL

## 2020-08-04 ASSESSMENT — PATIENT HEALTH QUESTIONNAIRE - PHQ9: SUM OF ALL RESPONSES TO PHQ QUESTIONS 1-9: 0

## 2020-08-04 ASSESSMENT — PAIN SCALES - GENERAL: PAINLEVEL: MILD PAIN (2)

## 2020-08-04 ASSESSMENT — MIFFLIN-ST. JEOR: SCORE: 1822.77

## 2020-08-04 NOTE — PROGRESS NOTES
Subjective     Villa Ruiz is a 60 year old male who presents to clinic today for the following health issues: Patient picked a tick off right knee 6 days ago. Initially some redness that is now resolved. Denies any other associated symptoms.    HPI       Tick bite      Duration: 6 days    Description (location/character/radiation): Outer side of Rt knee    Intensity:  moderate    Accompanying signs and symptoms: NA    History (similar episodes/previous evaluation): None    Precipitating or alleviating factors: None    Therapies tried and outcome: None         Patient Active Problem List   Diagnosis     ACP (advance care planning)     No past surgical history on file.    Social History     Tobacco Use     Smoking status: Current Every Day Smoker     Packs/day: 0.75     Years: 6.00     Pack years: 4.50     Types: Cigarettes, Cigars     Start date: 1/1/2012     Smokeless tobacco: Never Used     Tobacco comment: pt denied QP 6/23/20   Substance Use Topics     Alcohol use: No     Alcohol/week: 0.0 standard drinks     Family History   Problem Relation Age of Onset     Diabetes Mother          Current Outpatient Medications   Medication Sig Dispense Refill     albuterol (PROAIR HFA/PROVENTIL HFA/VENTOLIN HFA) 108 (90 Base) MCG/ACT inhaler Inhale 2 puffs into the lungs every 6 hours 1 Inhaler 1     diclofenac (VOLTAREN) 1 % topical gel Apply topically daily as needed  11     fluticasone (FLONASE) 50 MCG/ACT nasal spray Spray 1 spray into both nostrils daily 16 g 1     loratadine (CLARITIN) 10 MG capsule Take 10 mg by mouth daily 30 capsule 1     Allergies   Allergen Reactions     Augmentin Nausea and Vomiting       Reviewed and updated as needed this visit by Provider         Review of Systems   Constitutional, HEENT, cardiovascular, pulmonary, gi and gu systems are negative, except as otherwise noted.      Objective    There were no vitals taken for this visit.  There is no height or weight on file to calculate  "BMI.  Physical Exam   GENERAL: healthy, alert and no distress. On exam of tick, definitely a female deer tick.            Assessment & Plan     (W57.XXXA) Tick bite, initial encounter  (primary encounter diagnosis)  Comment: Return to clinic in 5-6 weeks for Lyme screen  Plan: Lyme Disease Marilee with reflex to WB Serum            Tobacco Cessation:   reports that he has been smoking cigarettes and cigars. He started smoking about 8 years ago. He has a 4.50 pack-year smoking history. He has never used smokeless tobacco.  Discussion with patient regarding the risks associated with tobacco including dependency and health related illnesses including lung cancer.The health benefits of stopping tobacco abuse are discussed. Discussed options available to help patient stop tobacco use including medications and support network. Patient shows good understanding        BMI:   Estimated body mass index is 32 kg/m  as calculated from the following:    Height as of this encounter: 1.778 m (5' 10\").    Weight as of this encounter: 101.2 kg (223 lb).           Follow up with all results.      No follow-ups on file.    SHABBIR Tan  [facility]  "

## 2020-08-04 NOTE — NURSING NOTE
"Chief Complaint   Patient presents with     Insect Bites       Initial BP (!) 140/72 (BP Location: Right arm, Patient Position: Sitting, Cuff Size: Adult Regular)   Pulse 83   Temp 97.2  F (36.2  C) (Tympanic)   Ht 1.778 m (5' 10\")   Wt 101.2 kg (223 lb)   SpO2 97%   BMI 32.00 kg/m   Estimated body mass index is 32 kg/m  as calculated from the following:    Height as of this encounter: 1.778 m (5' 10\").    Weight as of this encounter: 101.2 kg (223 lb).  Medication Reconciliation: complete  Ankita Kurtz LPN  "

## 2020-08-05 ASSESSMENT — ANXIETY QUESTIONNAIRES: GAD7 TOTAL SCORE: 0

## 2020-08-06 ENCOUNTER — TELEPHONE (OUTPATIENT)
Dept: SURGERY | Facility: OTHER | Age: 60
End: 2020-08-06

## 2020-08-06 NOTE — TELEPHONE ENCOUNTER
This patient was referred for colonoscopy 3/24/2020 for positive Cologuard.    3rd attempt made to schedule colonoscopy today, but patient states that he is being treated for a tick bite and tested for Lymes and that he needs to wait at least 5 weeks to schedule. General surgery department will try once more to contact patient in the fall and patient is aware that he can also call when he is ready.     Referring provider notified that patient is still not scheduled. .

## 2020-08-06 NOTE — TELEPHONE ENCOUNTER
Please call patient. I advise that he call them back and get on the calendar for 6-8 weeks from now.

## 2020-08-17 ENCOUNTER — TELEPHONE (OUTPATIENT)
Dept: FAMILY MEDICINE | Facility: OTHER | Age: 60
End: 2020-08-17

## 2020-08-17 NOTE — TELEPHONE ENCOUNTER
Patient calling stating he was seen for a tick bite 8/4 by YUDY. States his hands are getting worse now, and wondering on information on medication. Please advise.     DEV Ha
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
## 2020-08-18 PROCEDURE — 86618 LYME DISEASE ANTIBODY: CPT | Mod: ZL | Performed by: PHYSICIAN ASSISTANT

## 2020-08-18 PROCEDURE — 36415 COLL VENOUS BLD VENIPUNCTURE: CPT | Mod: ZL | Performed by: PHYSICIAN ASSISTANT

## 2020-08-20 ENCOUNTER — TELEPHONE (OUTPATIENT)
Dept: FAMILY MEDICINE | Facility: OTHER | Age: 60
End: 2020-08-20

## 2020-08-20 LAB — B BURGDOR IGG+IGM SER QL: 0.08 (ref 0–0.89)

## 2020-08-20 NOTE — TELEPHONE ENCOUNTER
Patient was tested for lymes and it was negative. He would like to get retested in 1 month because his hands hurts from the arthritis and he is convinced that he could have lyme's.

## 2020-08-24 DIAGNOSIS — J98.01 ACUTE BRONCHOSPASM: ICD-10-CM

## 2020-08-24 RX ORDER — ALBUTEROL SULFATE 90 UG/1
AEROSOL, METERED RESPIRATORY (INHALATION)
Qty: 18 G | Refills: 1 | Status: SHIPPED | OUTPATIENT
Start: 2020-08-24 | End: 2021-02-11

## 2020-09-15 ENCOUNTER — APPOINTMENT (OUTPATIENT)
Dept: LAB | Facility: OTHER | Age: 60
End: 2020-09-15
Attending: FAMILY MEDICINE
Payer: COMMERCIAL
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
## 2020-09-15 PROCEDURE — 86618 LYME DISEASE ANTIBODY: CPT | Performed by: PHYSICIAN ASSISTANT

## 2020-09-15 PROCEDURE — 36415 COLL VENOUS BLD VENIPUNCTURE: CPT | Mod: ZL | Performed by: PHYSICIAN ASSISTANT

## 2020-09-17 LAB — B BURGDOR IGG+IGM SER QL: 0.09 (ref 0–0.89)

## 2020-10-08 ENCOUNTER — TELEPHONE (OUTPATIENT)
Dept: SURGERY | Facility: OTHER | Age: 60
End: 2020-10-08

## 2020-10-08 NOTE — TELEPHONE ENCOUNTER
Referral received for colonoscopy for POSITIVE COLOGUARD test from March.  This is the 4th attempt since March by phone to schedule meet and greet colonoscopy. The patient indicated in August that he needed to wait 6-8 weeks due to a tick bite. When called today, the patient declined to schedule. Strongly advised patient that it is important to schedule soon as he as already had a positive cologuard test, but the patient still declines stating that he is too busy.  Letter sent requesting patient to call office to schedule colonoscopy/consult.     Referring provider notified that patient has declined to schedule at this time.

## 2020-10-08 NOTE — LETTER
October 8, 2020          Villa Ruiz  [address] [place] [address]   [facility] [id number]      Dear Villa,       Our office has received a colonoscopy referral for colonoscopy. You were referred after your positive Cologuard test. We have made two or more unsuccessful attempts to contact you. The Cologuard test can identify altered DNA shed in the stool associated with the possibility of colon cancer or precancerous lesions. Colonoscopy is the best screening tool for colon cancer.  Colon cancer is now the second leading cause of cancer death in the [place] for both men and women. There are over 130,000 new cases and 50,000 deaths per year from colon cancer.  Colonoscopies can prevent a large majority of these deaths. This test is not only looking for cancer, but also precancerous lesions. These lesions can be removed before they ever become cancer. You can be given some sedation which makes the test comfortable for most people. You may schedule an appointment to discuss the procedure first if you prefer. You may or may not need a preop appointment  with your primary care provider prior to your procedure.     If you are under/uninsured, you may contact our Patient Financial Services Office at [phone] to determine which benefits you may qualify for. Please call us at [phone] if you have questions or would like arrange your colonoscopy procedure or consultation appointment.       Sincerely;       [facility] Surgery Team

## 2020-10-09 NOTE — TELEPHONE ENCOUNTER
Spoke with patient and he states he is to busy to do a colonoscopy for at least 2 months or so.    Felipa POST LPN

## 2020-10-30 ENCOUNTER — ALLIED HEALTH/NURSE VISIT (OUTPATIENT)
Dept: FAMILY MEDICINE | Facility: OTHER | Age: 60
End: 2020-10-30
Attending: FAMILY MEDICINE
Payer: COMMERCIAL

## 2020-10-30 DIAGNOSIS — Z23 NEED FOR PROPHYLACTIC VACCINATION AND INOCULATION AGAINST INFLUENZA: Primary | ICD-10-CM

## 2020-10-30 PROCEDURE — 90682 RIV4 VACC RECOMBINANT DNA IM: CPT

## 2021-02-11 DIAGNOSIS — J98.01 ACUTE BRONCHOSPASM: ICD-10-CM

## 2021-02-11 RX ORDER — ALBUTEROL SULFATE 90 UG/1
AEROSOL, METERED RESPIRATORY (INHALATION)
Qty: 18 G | Refills: 1 | Status: SHIPPED | OUTPATIENT
Start: 2021-02-11 | End: 2021-09-02

## 2021-03-09 NOTE — PROGRESS NOTES
Physical Therapy Discharge Note      Name: Villa Ruiz MRN# [id number]   Age: 60 year old YOB: 1960        Requesting provider: Dr. Chou  Diagnosis: L TMJ with otalgia  Prescription: Evaluate and treat  Frequency/duration: Therapists discretion  Services provided: The patient was seen for a total of 1 visit on 6/26/20.  See the previous documentation for treatment details.           Plan  Intervention:  The patient has not contacted the department since the previous visit.  For additional information regarding goals and status as of last, please refer to prior documentation.  It is uncertain if the patient has attained any further goals at this time.  The patient will be formally discharged from physical therapy care.  We will resume physical therapy services as per physician referral and discretion.     Follow up:  Recheck with physician as needed

## 2021-04-07 ENCOUNTER — IMMUNIZATION (OUTPATIENT)
Dept: FAMILY MEDICINE | Facility: OTHER | Age: 61
End: 2021-04-07
Attending: FAMILY MEDICINE
Payer: COMMERCIAL

## 2021-04-07 PROCEDURE — 91300 PR COVID VAC PFIZER DIL RECON 30 MCG/0.3 ML IM: CPT

## 2021-04-21 ENCOUNTER — VIRTUAL VISIT (OUTPATIENT)
Dept: FAMILY MEDICINE | Facility: OTHER | Age: 61
End: 2021-04-21
Attending: FAMILY MEDICINE
Payer: COMMERCIAL

## 2021-04-21 DIAGNOSIS — Z87.891 PERSONAL HISTORY OF TOBACCO USE, PRESENTING HAZARDS TO HEALTH: Primary | ICD-10-CM

## 2021-04-21 PROCEDURE — 99441 PR PHYSICIAN TELEPHONE EVALUATION 5-10 MIN: CPT | Performed by: FAMILY MEDICINE

## 2021-04-21 NOTE — PROGRESS NOTES
"Villa is a 60 year old who is being evaluated via a billable telephone visit.      What phone number would you like to be contacted at? [phone]  How would you like to obtain your AVS?     Assessment & Plan     Personal history of tobacco use, presenting hazards to health  See below.  He is a smoker so we will get baseline.  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    - Spirometry, Breathing Capacity             Tobacco Cessation:   reports that he has been smoking cigarettes and cigars. He started smoking about 9 years ago. He has a 4.50 pack-year smoking history. He has never used smokeless tobacco.      BMI:   Estimated body mass index is 32 kg/m  as calculated from the following:    Height as of 8/4/20: 1.778 m (5' 10\").    Weight as of 8/4/20: 101.2 kg (223 lb).           No follow-ups on file.    Judah Moody MD  [facility]   Villa is a 60 year old who presents for the following health issues     HPI     Pt needs a lung and forced vital capacity test done.  Pt had a violation of his B card after having a non-alcoholic beverage last year.          Review of Systems   Constitutional, HEENT, cardiovascular, pulmonary, gi and gu systems are negative, except as otherwise noted.      Objective           Vitals:  No vitals were obtained today due to virtual visit.    Physical Exam   healthy, alert and no distress  PSYCH: Alert and oriented times 3; coherent speech, normal   rate and volume, able to articulate logical thoughts, able   to abstract reason, no tangential thoughts, no hallucinations   or delusions  His affect is normal  RESP: No cough, no audible wheezing, able to talk in full sentences  Remainder of exam unable to be completed due to telephone visits                Phone call duration: 5 minutes  "

## 2021-04-23 ENCOUNTER — TELEPHONE (OUTPATIENT)
Dept: FAMILY MEDICINE | Facility: OTHER | Age: 61
End: 2021-04-23

## 2021-04-23 NOTE — TELEPHONE ENCOUNTER
Patient would like his spirometry test scheduled as soon as possible.   Please call patient to advise  Ok to leave  [phone]

## 2021-04-23 NOTE — TELEPHONE ENCOUNTER
This writer called and spoke with patient and explained that the respiratory department will call to scheduled an appointment. This writer attempted to call respiratory, but no answer. Advised patient to call back next week if he does not receive a call this afternoon or on Monday. Patient verbalized understanding.

## 2021-04-26 ENCOUNTER — IMMUNIZATION (OUTPATIENT)
Dept: FAMILY MEDICINE | Facility: OTHER | Age: 61
End: 2021-04-26
Attending: FAMILY MEDICINE
Payer: COMMERCIAL

## 2021-04-26 PROCEDURE — 0002A PR COVID VAC PFIZER DIL RECON 30 MCG/0.3 ML IM: CPT

## 2021-04-28 ENCOUNTER — TELEPHONE (OUTPATIENT)
Dept: FAMILY MEDICINE | Facility: OTHER | Age: 61
End: 2021-04-28
Payer: COMMERCIAL

## 2021-04-28 DIAGNOSIS — Z87.891 PERSONAL HISTORY OF TOBACCO USE, PRESENTING HAZARDS TO HEALTH: Primary | ICD-10-CM

## 2021-04-28 NOTE — TELEPHONE ENCOUNTER
Rec'd call from imaging that patient called stating he needs his PFTs scheduled. Need PFT ordered  Lyric Benavides

## 2021-05-24 ENCOUNTER — HOSPITAL ENCOUNTER (OUTPATIENT)
Dept: RESPIRATORY THERAPY | Facility: HOSPITAL | Age: 61
Discharge: HOME | End: 2021-05-24
Attending: FAMILY MEDICINE | Admitting: INTERNAL MEDICINE
Payer: COMMERCIAL

## 2021-05-24 DIAGNOSIS — Z87.891 PERSONAL HISTORY OF TOBACCO USE, PRESENTING HAZARDS TO HEALTH: ICD-10-CM

## 2021-05-24 PROCEDURE — 94010 BREATHING CAPACITY TEST: CPT

## 2021-05-24 PROCEDURE — 94010 BREATHING CAPACITY TEST: CPT | Mod: 26 | Performed by: INTERNAL MEDICINE

## 2021-05-24 NOTE — PROGRESS NOTES
Pt here for Spirometry testing.  Patient was unable to perform Spirometry maneuver correctly, unable to exhale completely.  No bronchodilator given as patient is unable to perform the test.    Billie Bunn, RT on 5/24/2021 at 12:33 PM

## 2021-06-01 ENCOUNTER — TELEPHONE (OUTPATIENT)
Dept: FAMILY MEDICINE | Facility: OTHER | Age: 61
End: 2021-06-01

## 2021-06-09 DIAGNOSIS — H69.93 DYSFUNCTION OF BOTH EUSTACHIAN TUBES: ICD-10-CM

## 2021-06-09 NOTE — TELEPHONE ENCOUNTER
loratadine (CLARITIN) 10 MG capsule      Last Written Prescription Date:  11/21/19  Last Fill Quantity: 30,   # refills: 1  Last Office Visit: 6/23/20 with Salminen  Future Office visit:       Routing refill request to provider for review/approval because:  Has not been signed since 2019. Please advise. Thank you!

## 2021-09-02 DIAGNOSIS — J98.01 ACUTE BRONCHOSPASM: ICD-10-CM

## 2021-09-02 RX ORDER — ALBUTEROL SULFATE 90 UG/1
AEROSOL, METERED RESPIRATORY (INHALATION)
Qty: 18 G | Refills: 1 | Status: SHIPPED | OUTPATIENT
Start: 2021-09-02 | End: 2022-02-07

## 2021-09-30 ENCOUNTER — OFFICE VISIT (OUTPATIENT)
Dept: CHIROPRACTIC MEDICINE | Facility: OTHER | Age: 61
End: 2021-09-30
Attending: CHIROPRACTOR
Payer: COMMERCIAL

## 2021-09-30 DIAGNOSIS — M99.01 SEGMENTAL AND SOMATIC DYSFUNCTION OF CERVICAL REGION: Primary | ICD-10-CM

## 2021-09-30 DIAGNOSIS — M99.02 SEGMENTAL AND SOMATIC DYSFUNCTION OF THORACIC REGION: ICD-10-CM

## 2021-09-30 DIAGNOSIS — M54.2 CERVICALGIA: ICD-10-CM

## 2021-09-30 PROCEDURE — 99213 OFFICE O/P EST LOW 20 MIN: CPT | Mod: 25 | Performed by: CHIROPRACTOR

## 2021-09-30 PROCEDURE — 98940 CHIROPRACT MANJ 1-2 REGIONS: CPT | Mod: AT | Performed by: CHIROPRACTOR

## 2021-09-30 NOTE — PROGRESS NOTES
Subjective Finding:    Chief compalint: Patient presents with:  Neck Pain  , Pain Scale: 7/10, Intensity: sharp, Duration: 2 months, Radiating: no.    Date of injury:     Activities that the pain restricts:   Home/household/hobbies/social activities: yes.  Work duties: yes.  Sleep: yes.  Makes symptoms better: rest.  Makes symptoms worse: cervical extension and cervical flexion.  Have you seen anyone else for the symptoms? No.  Work related: no.  Automobile related injury: no.    Objective and Assessment:    Posture Analysis:   High shoulder: .  Head tilt: .  High iliac crest: .  Head carriage: forward.  Thoracic Kyphosis: neutral.  Lumbar Lordosis: neutral.    Lumbar Range of Motion: .  Cervical Range of Motion: extension decreased, left lateral flexion decreased and right lateral flexion decreased.  Thoracic Range of Motion: .  Extremity Range of Motion: .    Palpation:   Traps: sharp pain, referred pain: no    Segmental dysfunction pre-treatment and treatment area: C2, C5, C6 and T2.      Assessment post-treatment:  Cervical: ROM increased.  Thoracic: ROM increased.  Lumbar: .    Comments: .      Complicating Factors: .    Procedure(s):  CMT:  81463 Chiropractic manipulative treatment 1-2 regions performed   Cervical: Diversified, See above for level, Supine and Thoracic: Diversified, See above for level, Prone    Modalities:  None performed this visit    Therapeutic procedures:  None    Plan:  Treatment plan: PRN.  Instructed patient: stretch as instructed at visit.  Short term goals: reduce pain.  Long term goals: restore normal function.  Prognosis: good.

## 2021-10-03 NOTE — PROGRESS NOTES
Assessment & Plan     (H93.8X3) Sensation of fullness in both ears  (primary encounter diagnosis)  Comment: ref to ENT for yearly ear cleaning   Plan: Otolaryngology Referral            (J32.0) Left maxillary sinusitis  Comment: treat with ABX. Add Zyrtec and Flonase   Plan: fluticasone (FLONASE) 50 MCG/ACT nasal spray,         cetirizine (ZYRTEC) 10 MG tablet, azithromycin         (ZITHROMAX Z-RIDDHI) 250 MG tablet            (H61.21) Impacted cerumen of right ear  Comment: ear flush per LPN   Plan: Large amount of cerumen extracted from right ear        Tobacco Cessation:   reports that he has been smoking cigarettes and cigars. He started smoking about 9 years ago. He has a 4.50 pack-year smoking history. He has never used smokeless tobacco.  Tobacco Cessation Action Plan: Information offered: Patient not interested at this time    See Patient Instructions    Return if symptoms worsen or fail to improve.    Romina Beltran NP  [facility]    Fredo Driver is a 61 year old who presents for the following health issues   HPI       Right ear plugged, allergies?   Acute illness concerns: right ear, allergies, sinus?  Onset/Duration: July 2021  Symptoms:  Fever: no  Chills/Sweats: no  Headache (location?): no  Sinus Pressure: YES  Conjunctivitis:  no  Ear Pain: YES: right  Rhinorrhea: YES  Congestion: YES  Sore Throat: no  Cough: no  Wheeze: no  Decreased Appetite: no  Nausea: no  Vomiting: no  Diarrhea: no  Dysuria/Freq.: no  Dysuria or Hematuria: no  Fatigue/Achiness: YES  Sick/Strep Exposure: no  Therapies tried and outcome: timothy seltzer plus and vics vaperizer    Review of Systems   Constitutional, HEENT, cardiovascular, pulmonary, gi and gu systems are negative, except as otherwise noted.      Objective    BP (!) 140/82   Pulse 72   Temp 96.8  F (36  C) (Tympanic)   Resp 18   Wt 98.9 kg (218 lb)   SpO2 95%   BMI 31.28 kg/m    Body mass index is 31.28 kg/m .  Physical Exam    GENERAL: healthy, alert and no distress  HENT: +right middle ear canal with cerumen impaction. After ear flush, right middle ear clear with TM intact. Left middle ear clear with TM intact. nose and mouth without ulcers or lesions. +Bilateral maxillary sinuses TTP and fullness   NECK: no adenopathy, no asymmetry, masses, or scars and thyroid normal to palpation  RESP: lungs clear to auscultation - no rales, rhonchi or wheezes  CV: regular rate and rhythm, normal S1 S2, no S3 or S4, no murmur, click or rub, no peripheral edema and peripheral pulses strong  MS: no gross musculoskeletal defects noted, no edema  SKIN: no suspicious lesions or rashes  NEURO: Normal strength and tone, mentation intact and speech normal  PSYCH: mentation appears normal, affect normal/bright

## 2021-10-04 ENCOUNTER — OFFICE VISIT (OUTPATIENT)
Dept: CHIROPRACTIC MEDICINE | Facility: OTHER | Age: 61
End: 2021-10-04
Attending: CHIROPRACTOR
Payer: COMMERCIAL

## 2021-10-04 DIAGNOSIS — M54.2 CERVICALGIA: ICD-10-CM

## 2021-10-04 DIAGNOSIS — M99.01 SEGMENTAL AND SOMATIC DYSFUNCTION OF CERVICAL REGION: Primary | ICD-10-CM

## 2021-10-04 DIAGNOSIS — M99.02 SEGMENTAL AND SOMATIC DYSFUNCTION OF THORACIC REGION: ICD-10-CM

## 2021-10-04 PROCEDURE — 98940 CHIROPRACT MANJ 1-2 REGIONS: CPT | Mod: AT | Performed by: CHIROPRACTOR

## 2021-10-04 NOTE — PROGRESS NOTES
Subjective Finding:    Chief compalint: Patient presents with:  Back Pain  Neck Pain  , Pain Scale: 7/10, Intensity: sharp, Duration: 2 months, Radiating: no.    Date of injury:     Activities that the pain restricts:   Home/household/hobbies/social activities: yes.  Work duties: yes.  Sleep: yes.  Makes symptoms better: rest.  Makes symptoms worse: cervical extension and cervical flexion.  Have you seen anyone else for the symptoms? No.  Work related: no.  Automobile related injury: no.    Objective and Assessment:    Posture Analysis:   High shoulder: .  Head tilt: .  High iliac crest: .  Head carriage: forward.  Thoracic Kyphosis: neutral.  Lumbar Lordosis: neutral.    Lumbar Range of Motion: .  Cervical Range of Motion: extension decreased, left lateral flexion decreased and right lateral flexion decreased.  Thoracic Range of Motion: .  Extremity Range of Motion: .    Palpation:   Traps: sharp pain, referred pain: no    Segmental dysfunction pre-treatment and treatment area: C2, C5, C6 and T2.      Assessment post-treatment:  Cervical: ROM increased.  Thoracic: ROM increased.  Lumbar: .    Comments: .      Complicating Factors: .    Procedure(s):  CMT:  27422 Chiropractic manipulative treatment 1-2 regions performed   Cervical: Diversified, See above for level, Supine and Thoracic: Diversified, See above for level, Prone    Modalities:  None performed this visit    Therapeutic procedures:  None    Plan:  Treatment plan: PRN.  Instructed patient: stretch as instructed at visit.  Short term goals: reduce pain.  Long term goals: restore normal function.  Prognosis: good.

## 2021-10-05 ENCOUNTER — OFFICE VISIT (OUTPATIENT)
Dept: FAMILY MEDICINE | Facility: OTHER | Age: 61
End: 2021-10-05
Attending: NURSE PRACTITIONER
Payer: COMMERCIAL

## 2021-10-05 VITALS
HEART RATE: 72 BPM | OXYGEN SATURATION: 95 % | BODY MASS INDEX: 31.28 KG/M2 | WEIGHT: 218 LBS | SYSTOLIC BLOOD PRESSURE: 140 MMHG | DIASTOLIC BLOOD PRESSURE: 82 MMHG | RESPIRATION RATE: 18 BRPM | TEMPERATURE: 96.8 F

## 2021-10-05 DIAGNOSIS — H93.8X3 SENSATION OF FULLNESS IN BOTH EARS: Primary | ICD-10-CM

## 2021-10-05 DIAGNOSIS — M25.50 PAIN IN JOINT, MULTIPLE SITES: ICD-10-CM

## 2021-10-05 DIAGNOSIS — H61.21 IMPACTED CERUMEN OF RIGHT EAR: ICD-10-CM

## 2021-10-05 DIAGNOSIS — J32.0 LEFT MAXILLARY SINUSITIS: ICD-10-CM

## 2021-10-05 PROCEDURE — 99213 OFFICE O/P EST LOW 20 MIN: CPT | Performed by: NURSE PRACTITIONER

## 2021-10-05 PROCEDURE — 69209 REMOVE IMPACTED EAR WAX UNI: CPT | Mod: RT

## 2021-10-05 PROCEDURE — G0463 HOSPITAL OUTPT CLINIC VISIT: HCPCS

## 2021-10-05 PROCEDURE — G0463 HOSPITAL OUTPT CLINIC VISIT: HCPCS | Mod: 25

## 2021-10-05 RX ORDER — CETIRIZINE HYDROCHLORIDE 10 MG/1
10 TABLET ORAL DAILY
Qty: 14 TABLET | Refills: 0 | Status: SHIPPED | OUTPATIENT
Start: 2021-10-05 | End: 2021-10-12

## 2021-10-05 RX ORDER — FLUTICASONE PROPIONATE 50 MCG
1 SPRAY, SUSPENSION (ML) NASAL DAILY
Qty: 16 G | Refills: 1 | Status: SHIPPED | OUTPATIENT
Start: 2021-10-05 | End: 2022-07-26

## 2021-10-05 RX ORDER — AZITHROMYCIN 250 MG/1
TABLET, FILM COATED ORAL
Qty: 6 TABLET | Refills: 0 | Status: SHIPPED | OUTPATIENT
Start: 2021-10-05 | End: 2022-04-18

## 2021-10-05 ASSESSMENT — PAIN SCALES - GENERAL: PAINLEVEL: NO PAIN (0)

## 2021-10-05 NOTE — NURSING NOTE
"Chief Complaint   Patient presents with     Allergies       Initial BP (!) 140/82   Pulse 72   Temp 96.8  F (36  C) (Tympanic)   Resp 18   Wt 98.9 kg (218 lb)   SpO2 95%   BMI 31.28 kg/m   Estimated body mass index is 31.28 kg/m  as calculated from the following:    Height as of 8/4/20: 1.778 m (5' 10\").    Weight as of this encounter: 98.9 kg (218 lb).   Medication Reconciliation: complete  Yari Blackwell LPN    "

## 2021-10-05 NOTE — PATIENT INSTRUCTIONS
Patient Education     Sinusitis (Antibiotic Treatment)    The sinuses are air-filled spaces within the bones of the face. They connect to the inside of the nose. Sinusitis is an inflammation of the tissue that lines the sinuses. Sinusitis can occur during a cold. It can also happen due to allergies to pollens and other particles in the air. Sinusitis can cause symptoms of sinus congestion and a feeling of fullness. A sinus infection causes fever, headache, and facial pain. There is often green or yellow fluid draining from the nose or into the back of the throat (post-nasal drip). You have been given antibiotics to treat this condition.   Home care    Take the full course of antibiotics as instructed. Don't stop taking them, even when you feel better.    Drink plenty of water, hot tea, and other liquids as directed by the healthcare provider. This may help thin nasal mucus. It also may help your sinuses drain fluids.    Heat may help soothe painful areas of your face. Use a towel soaked in hot water. Or,  the shower and direct the warm spray onto your face. Using a vaporizer along with a menthol rub at night may also help soothe symptoms.     An expectorant with guaifenesin may help thin nasal mucus and help your sinuses drain fluids. Talk with your provider or pharmacists before taking an over-the-counter (OTC) medicine if you have any questions about it or its side effects..    You can use an OTC decongestant, unless a similar medicine was prescribed to you. Nasal sprays work the fastest. Use one that contains phenylephrine or oxymetazoline. First blow your nose gently. Then use the spray. Don't use these medicines more often than directed on the label. If you do, your symptoms may get worse. You may also take pills that contain pseudoephedrine. Don t use products that combine multiple medicines. This is because side effects may be increased. Read labels. You can also ask the pharmacist for help. (People  with high blood pressure should not use decongestants. They can raise blood pressure.) Talk with your provider or pharmacist if you have any questions about the medicine..    OTC antihistamines may help if allergies contributed to your sinusitis. Talk with your provider or pharmacist if you have any questions about the medicine..    Don't use nasal rinses or irrigation during an acute sinus infection, unless your healthcare provider tells you to. Rinsing may spread the infection to other areas in your sinuses.    Use acetaminophen or ibuprofen to control pain, unless another pain medicine was prescribed to you. If you have chronic liver or kidney disease or ever had a stomach ulcer, talk with your healthcare provider before using these medicines. Never give aspirin to anyone under age 18 who is ill with a fever. It may cause severe liver damage.    Don't smoke. This can make symptoms worse.    Follow-up care  Follow up with your healthcare provider, or as advised.   When to seek medical advice  Call your healthcare provider if any of these occur:     Facial pain or headache that gets worse    Stiff neck    Unusual drowsiness or confusion    Swelling of your forehead or eyelids    Symptoms don't go away in 10 days    Vision problems, such as blurred or double vision    Fever of 100.4 F (38 C) or higher, or as directed by your healthcare provider  Call 911  Call 911 if any of these occur:     Seizure    Trouble breathing    Feeling dizzy or faint    Fingernails, skin or lips look blue, purple , or gray  Prevention  Here are steps you can take to help prevent an infection:     Keep good hand washing habits.    Don t have close contact with people who have sore throats, colds, or other upper respiratory infections.    Don t smoke, and stay away from secondhand smoke.    Stay up to date with of your vaccines.  [employer] last reviewed this educational content on 12/1/2019 2000-2021 [employer]. All rights  reserved. This information is not intended as a substitute for professional medical care. Always follow your healthcare professional's instructions.  Patient Education       Earwax, Home Treatment    Everyone produces earwax from the lining of the ear canal. It serves to lubricate and protect the ear. The wax that forms in the canal naturally moves toward the outside of the ear and falls out. Sometimes the ear canal may contain too much wax. This can cause a blockage and loss of hearing. Directions are given below for home treatment.  Home care  If your doctor has advised you to remove a wax blockage yourself, follow these directions:    Unless a medicine was prescribed, you may use an over-the-counter product made for clearing earwax. These contain carbamide peroxide. Lie down with the blocked ear facing upward. Apply one dropper full of medicine and wait a few minutes. Grasp the outer ear and wiggle it to help the solution enter the canal.    Lean over a sink or basin with the blocked ear facing downward. Use a bulb syringe filled with warm (not hot or cold) water to rinse the ear several times. Use gentle pressure only.    If you are having trouble draining the water out of your ear canal, put a few drops of rubbing alcohol (isopropyl alcohol) into the ear canal. This will help remove the remaining water.    Repeat this procedure once a day for up to three days, or until your hearing is back to normal. Do not use this treatment for more than three days in a row.  Don ts    Don t use cold water to rinse the ear. This will make you dizzy.    Don t perform this procedure if you have an ear infection.    Don t perform this procedure if you have a ruptured eardrum.    Don t use cotton swabs, matches, hairpins, keys, or other objects to  clean  the ear canal. This can cause infection of the ear canal or rupture the eardrum. Because of their size and shape, cotton swabs can push earwax deeper into the ear canal instead  of removing it.  Follow-up care  Follow up with your health care provider if you are not improving after three cleaning attempts, or as advised.  When to seek medical advice  Call your health care provider right away if any of these occur:    Worsening ear pain    Fever of 101 F (38.3 C) or higher, or as directed by your health care provider    Hearing does not return to normal after three days of treatment    Fluid drainage or bleeding from the ear canal    Swelling, redness, or tenderness of the outer ear    Headache, neck pain, or stiff neck    [phone] [employer]. [address] [place] [id number]. All rights reserved. This information is not intended as a substitute for professional medical care. Always follow your healthcare professional's instructions.

## 2021-10-06 NOTE — TELEPHONE ENCOUNTER
voltaren      Last Written Prescription Date:  Historical  Last Fill Quantity: ,   # refills:   Last Office Visit: 10/5/21  Future Office visit:    Next 5 appointments (look out 90 days)    Oct 07, 2021  7:50 AM  Return Visit with NITIN Breaux ([facility] [place] ) [address] [place] [address]  [place] [address]  [phone]

## 2021-10-07 ENCOUNTER — OFFICE VISIT (OUTPATIENT)
Dept: CHIROPRACTIC MEDICINE | Facility: OTHER | Age: 61
End: 2021-10-07
Attending: CHIROPRACTOR
Payer: COMMERCIAL

## 2021-10-07 DIAGNOSIS — M99.02 SEGMENTAL AND SOMATIC DYSFUNCTION OF THORACIC REGION: ICD-10-CM

## 2021-10-07 DIAGNOSIS — M54.2 CERVICALGIA: ICD-10-CM

## 2021-10-07 DIAGNOSIS — M99.01 SEGMENTAL AND SOMATIC DYSFUNCTION OF CERVICAL REGION: Primary | ICD-10-CM

## 2021-10-07 DIAGNOSIS — M99.03 SEGMENTAL AND SOMATIC DYSFUNCTION OF LUMBAR REGION: ICD-10-CM

## 2021-10-07 PROCEDURE — 98940 CHIROPRACT MANJ 1-2 REGIONS: CPT | Mod: AT | Performed by: CHIROPRACTOR

## 2021-10-07 NOTE — PROGRESS NOTES
Subjective Finding:    Chief compalint: Patient presents with:  Back Pain  Neck Pain  , Pain Scale: 7/10, Intensity: sharp, Duration: 2 months, Radiating: no.    Date of injury:     Activities that the pain restricts:   Home/household/hobbies/social activities: yes.  Work duties: yes.  Sleep: yes.  Makes symptoms better: rest.  Makes symptoms worse: cervical extension and cervical flexion.  Have you seen anyone else for the symptoms? No.  Work related: no.  Automobile related injury: no.    Objective and Assessment:    Posture Analysis:   High shoulder: .  Head tilt: .  High iliac crest: .  Head carriage: forward.  Thoracic Kyphosis: neutral.  Lumbar Lordosis: neutral.    Lumbar Range of Motion: .  Cervical Range of Motion: extension decreased, left lateral flexion decreased and right lateral flexion decreased.  Thoracic Range of Motion: .  Extremity Range of Motion: .    Palpation:   Traps: sharp pain, referred pain: no    Segmental dysfunction pre-treatment and treatment area: C2, C5, C6 and T2.      Assessment post-treatment:  Cervical: ROM increased.  Thoracic: ROM increased.  Lumbar: .    Comments: .      Complicating Factors: .    Procedure(s):  CMT:  86255 Chiropractic manipulative treatment 1-2 regions performed   Cervical: Diversified, See above for level, Supine and Thoracic: Diversified, See above for level, Prone    Modalities:  None performed this visit    Therapeutic procedures:  None    Plan:  Treatment plan: PRN.  Instructed patient: stretch as instructed at visit.  Short term goals: reduce pain.  Long term goals: restore normal function.  Prognosis: good.

## 2021-10-11 ENCOUNTER — OFFICE VISIT (OUTPATIENT)
Dept: CHIROPRACTIC MEDICINE | Facility: OTHER | Age: 61
End: 2021-10-11
Attending: CHIROPRACTOR
Payer: COMMERCIAL

## 2021-10-11 DIAGNOSIS — M54.2 CERVICALGIA: ICD-10-CM

## 2021-10-11 DIAGNOSIS — M99.01 SEGMENTAL AND SOMATIC DYSFUNCTION OF CERVICAL REGION: Primary | ICD-10-CM

## 2021-10-11 DIAGNOSIS — M99.02 SEGMENTAL AND SOMATIC DYSFUNCTION OF THORACIC REGION: ICD-10-CM

## 2021-10-11 PROCEDURE — 98940 CHIROPRACT MANJ 1-2 REGIONS: CPT | Mod: AT | Performed by: CHIROPRACTOR

## 2021-10-11 NOTE — PROGRESS NOTES
Subjective Finding:    Chief compalint: Patient presents with:  Neck Pain  , Pain Scale: 7/10, Intensity: sharp, Duration: 2 months, Radiating: no.    Date of injury:     Activities that the pain restricts:   Home/household/hobbies/social activities: yes.  Work duties: yes.  Sleep: yes.  Makes symptoms better: rest.  Makes symptoms worse: cervical extension and cervical flexion.  Have you seen anyone else for the symptoms? No.  Work related: no.  Automobile related injury: no.    Objective and Assessment:    Posture Analysis:   High shoulder: .  Head tilt: .  High iliac crest: .  Head carriage: forward.  Thoracic Kyphosis: neutral.  Lumbar Lordosis: neutral.    Lumbar Range of Motion: .  Cervical Range of Motion: extension decreased, left lateral flexion decreased and right lateral flexion decreased.  Thoracic Range of Motion: .  Extremity Range of Motion: .    Palpation:   Traps: sharp pain, referred pain: no    Segmental dysfunction pre-treatment and treatment area: C2, C5, C6 and T2.      Assessment post-treatment:  Cervical: ROM increased.  Thoracic: ROM increased.  Lumbar: .    Comments: .      Complicating Factors: .    Procedure(s):  CMT:  39676 Chiropractic manipulative treatment 1-2 regions performed   Cervical: Diversified, See above for level, Supine and Thoracic: Diversified, See above for level, Prone    Modalities:  None performed this visit    Therapeutic procedures:  None    Plan:  Treatment plan: PRN.  Instructed patient: stretch as instructed at visit.  Short term goals: reduce pain.  Long term goals: restore normal function.  Prognosis: good.

## 2021-10-12 ENCOUNTER — OFFICE VISIT (OUTPATIENT)
Dept: OTOLARYNGOLOGY | Facility: OTHER | Age: 61
End: 2021-10-12
Attending: NURSE PRACTITIONER
Payer: COMMERCIAL

## 2021-10-12 VITALS
TEMPERATURE: 97.5 F | HEIGHT: 70 IN | WEIGHT: 218 LBS | HEART RATE: 77 BPM | DIASTOLIC BLOOD PRESSURE: 74 MMHG | BODY MASS INDEX: 31.21 KG/M2 | OXYGEN SATURATION: 97 % | SYSTOLIC BLOOD PRESSURE: 136 MMHG

## 2021-10-12 DIAGNOSIS — H90.3 SENSORINEURAL HEARING LOSS (SNHL) OF BOTH EARS: ICD-10-CM

## 2021-10-12 DIAGNOSIS — Z72.0 TOBACCO USE: ICD-10-CM

## 2021-10-12 DIAGNOSIS — R09.81 NASAL CONGESTION: ICD-10-CM

## 2021-10-12 DIAGNOSIS — Z87.09 HISTORY OF ACUTE SINUSITIS: Primary | ICD-10-CM

## 2021-10-12 PROCEDURE — 92504 EAR MICROSCOPY EXAMINATION: CPT | Performed by: NURSE PRACTITIONER

## 2021-10-12 PROCEDURE — G0463 HOSPITAL OUTPT CLINIC VISIT: HCPCS

## 2021-10-12 PROCEDURE — 99213 OFFICE O/P EST LOW 20 MIN: CPT | Mod: 25 | Performed by: NURSE PRACTITIONER

## 2021-10-12 RX ORDER — CETIRIZINE HYDROCHLORIDE 10 MG/1
10 TABLET ORAL DAILY
Qty: 14 TABLET | Refills: 0 | Status: SHIPPED | OUTPATIENT
Start: 2021-10-12 | End: 2021-10-18

## 2021-10-12 RX ORDER — TRIAMCINOLONE ACETONIDE 55 UG/1
2 SPRAY, METERED NASAL DAILY
Qty: 16.5 G | Refills: 3 | Status: SHIPPED | OUTPATIENT
Start: 2021-10-12 | End: 2022-04-18

## 2021-10-12 ASSESSMENT — MIFFLIN-ST. JEOR: SCORE: 1795.09

## 2021-10-12 ASSESSMENT — PAIN SCALES - GENERAL: PAINLEVEL: NO PAIN (0)

## 2021-10-12 NOTE — NURSING NOTE
"Chief Complaint   Patient presents with     Cerumen Impaction     Ear cleaning        Initial /74 (Cuff Size: Adult Large)   Pulse 77   Temp 97.5  F (36.4  C) (Tympanic)   Ht 1.778 m (5' 10\")   Wt 98.9 kg (218 lb)   SpO2 97%   BMI 31.28 kg/m   Estimated body mass index is 31.28 kg/m  as calculated from the following:    Height as of this encounter: 1.778 m (5' 10\").    Weight as of this encounter: 98.9 kg (218 lb).  Medication Reconciliation: complete  Patricia Nichole LPN    "

## 2021-10-12 NOTE — PATIENT INSTRUCTIONS
Use Cetirizine 10 mg daily as needed for sinus congestion, runny nose, sinus pressure  Use nasacort 2 sprays daily as needed for sinus congestion, plugged nose, plugged ears  If symptoms do not improve in 2-3 days, call to schedule an appointment       Thank you for allowing Veronica GARCIA and our ENT team to participate in your care.  If your medications are too expensive, please call my nurse at the number listed below.  We can possibly change this medication.    If you have a scheduling or an appointment question please contact our Health Unit Coordinator at their direct line [phone]  ALL nursing questions or concerns can be directed to my Nurse Monique [phone].
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
## 2021-10-13 ENCOUNTER — TELEPHONE (OUTPATIENT)
Dept: OTOLARYNGOLOGY | Facility: OTHER | Age: 61
End: 2021-10-13

## 2021-10-13 NOTE — TELEPHONE ENCOUNTER
Can take zyrtec morning or evening.  If it is causing drowsiness, take in evening.  Stop Flonase and start nasacort.  The nasacort takes the place of the flonase.  JS

## 2021-10-13 NOTE — TELEPHONE ENCOUNTER
Called and left me a VM, I called him back.  He had questions about his medications that were prescribed.  Wanted to know if he should be taking Zyrtec at night or in the morning, and if he should stop Flonase and start his Nasacort.  I told him to start taking the Zyrtec this morning and if he feels it is making him sleepy to change to before bedtime.  I told him to start his Nasacort today and that I would ask provider about Flonase and if he should stop it.  Please advise.

## 2021-10-14 ENCOUNTER — OFFICE VISIT (OUTPATIENT)
Dept: CHIROPRACTIC MEDICINE | Facility: OTHER | Age: 61
End: 2021-10-14
Attending: CHIROPRACTOR
Payer: COMMERCIAL

## 2021-10-14 DIAGNOSIS — M54.2 CERVICALGIA: ICD-10-CM

## 2021-10-14 DIAGNOSIS — M99.02 SEGMENTAL AND SOMATIC DYSFUNCTION OF THORACIC REGION: ICD-10-CM

## 2021-10-14 DIAGNOSIS — M99.01 SEGMENTAL AND SOMATIC DYSFUNCTION OF CERVICAL REGION: Primary | ICD-10-CM

## 2021-10-14 PROCEDURE — 98940 CHIROPRACT MANJ 1-2 REGIONS: CPT | Mod: AT | Performed by: CHIROPRACTOR

## 2021-10-14 NOTE — PROGRESS NOTES
Subjective Finding:    Chief compalint: Patient presents with:  Neck Pain  , Pain Scale: 7/10, Intensity: sharp, Duration: 2 months, Radiating: no.    Date of injury:     Activities that the pain restricts:   Home/household/hobbies/social activities: yes.  Work duties: yes.  Sleep: yes.  Makes symptoms better: rest.  Makes symptoms worse: cervical extension and cervical flexion.  Have you seen anyone else for the symptoms? No.  Work related: no.  Automobile related injury: no.    Objective and Assessment:    Posture Analysis:   High shoulder: .  Head tilt: .  High iliac crest: .  Head carriage: forward.  Thoracic Kyphosis: neutral.  Lumbar Lordosis: neutral.    Lumbar Range of Motion: .  Cervical Range of Motion: extension decreased, left lateral flexion decreased and right lateral flexion decreased.  Thoracic Range of Motion: .  Extremity Range of Motion: .    Palpation:   Traps: sharp pain, referred pain: no    Segmental dysfunction pre-treatment and treatment area: C2, C5, C6 and T2.      Assessment post-treatment:  Cervical: ROM increased.  Thoracic: ROM increased.  Lumbar: .    Comments: .      Complicating Factors: .    Procedure(s):  CMT:  57394 Chiropractic manipulative treatment 1-2 regions performed   Cervical: Diversified, See above for level, Supine and Thoracic: Diversified, See above for level, Prone    Modalities:  None performed this visit    Therapeutic procedures:  None    Plan:  Treatment plan: PRN.  Instructed patient: stretch as instructed at visit.  Short term goals: reduce pain.  Long term goals: restore normal function.  Prognosis: good.

## 2021-10-18 ENCOUNTER — OFFICE VISIT (OUTPATIENT)
Dept: CHIROPRACTIC MEDICINE | Facility: OTHER | Age: 61
End: 2021-10-18
Attending: CHIROPRACTOR
Payer: COMMERCIAL

## 2021-10-18 DIAGNOSIS — R09.81 NASAL CONGESTION: Primary | ICD-10-CM

## 2021-10-18 DIAGNOSIS — M54.2 CERVICALGIA: ICD-10-CM

## 2021-10-18 DIAGNOSIS — H93.8X2 PLUGGED FEELING IN EAR, LEFT: ICD-10-CM

## 2021-10-18 DIAGNOSIS — M99.01 SEGMENTAL AND SOMATIC DYSFUNCTION OF CERVICAL REGION: Primary | ICD-10-CM

## 2021-10-18 DIAGNOSIS — M99.02 SEGMENTAL AND SOMATIC DYSFUNCTION OF THORACIC REGION: ICD-10-CM

## 2021-10-18 DIAGNOSIS — Z87.09 HISTORY OF ACUTE SINUSITIS: ICD-10-CM

## 2021-10-18 PROCEDURE — 98940 CHIROPRACT MANJ 1-2 REGIONS: CPT | Mod: AT | Performed by: CHIROPRACTOR

## 2021-10-18 RX ORDER — CETIRIZINE HYDROCHLORIDE 10 MG/1
10 TABLET ORAL DAILY
Qty: 90 TABLET | Refills: 3 | Status: SHIPPED | OUTPATIENT
Start: 2021-10-18 | End: 2023-05-30

## 2021-10-18 NOTE — PROGRESS NOTES
Subjective Finding:    Chief compalint: Patient presents with:  Neck Pain  , Pain Scale: 7/10, Intensity: sharp, Duration: 2 months, Radiating: no.    Date of injury:     Activities that the pain restricts:   Home/household/hobbies/social activities: yes.  Work duties: yes.  Sleep: yes.  Makes symptoms better: rest.  Makes symptoms worse: cervical extension and cervical flexion.  Have you seen anyone else for the symptoms? No.  Work related: no.  Automobile related injury: no.    Objective and Assessment:    Posture Analysis:   High shoulder: .  Head tilt: .  High iliac crest: .  Head carriage: forward.  Thoracic Kyphosis: neutral.  Lumbar Lordosis: neutral.    Lumbar Range of Motion: .  Cervical Range of Motion: extension decreased, left lateral flexion decreased and right lateral flexion decreased.  Thoracic Range of Motion: .  Extremity Range of Motion: .    Palpation:   Traps: sharp pain, referred pain: no    Segmental dysfunction pre-treatment and treatment area: C2, C5, C6 and T2.      Assessment post-treatment:  Cervical: ROM increased.  Thoracic: ROM increased.  Lumbar: .    Comments: .      Complicating Factors: .    Procedure(s):  CMT:  29277 Chiropractic manipulative treatment 1-2 regions performed   Cervical: Diversified, See above for level, Supine and Thoracic: Diversified, See above for level, Prone    Modalities:  None performed this visit    Therapeutic procedures:  None    Plan:  Treatment plan: PRN.  Instructed patient: stretch as instructed at visit.  Short term goals: reduce pain.  Long term goals: restore normal function.  Prognosis: good.

## 2021-10-19 ENCOUNTER — TELEPHONE (OUTPATIENT)
Dept: FAMILY MEDICINE | Facility: OTHER | Age: 61
End: 2021-10-19

## 2021-10-19 NOTE — TELEPHONE ENCOUNTER
3:18 PM    Reason for Call: Phone Call    Description: pt is calling to talk to nurse, he wants to know if he should get a flu shot    Was an appointment offered for this call? No  If yes : Appointment type              Date    Preferred method for responding to this message: Telephone Call  What is your phone number ?[phone]    If we cannot reach you directly, may we leave a detailed response at the number you provided? Yes    Can this message wait until your PCP/provider returns, if available today? Not applicable    Jil Null

## 2021-10-20 ENCOUNTER — ALLIED HEALTH/NURSE VISIT (OUTPATIENT)
Dept: FAMILY MEDICINE | Facility: OTHER | Age: 61
End: 2021-10-20
Attending: FAMILY MEDICINE
Payer: COMMERCIAL

## 2021-10-20 DIAGNOSIS — Z23 NEED FOR PROPHYLACTIC VACCINATION AND INOCULATION AGAINST INFLUENZA: Primary | ICD-10-CM

## 2021-10-20 PROCEDURE — 90471 IMMUNIZATION ADMIN: CPT

## 2021-10-26 ENCOUNTER — OFFICE VISIT (OUTPATIENT)
Dept: CHIROPRACTIC MEDICINE | Facility: OTHER | Age: 61
End: 2021-10-26
Attending: CHIROPRACTOR
Payer: COMMERCIAL

## 2021-10-26 DIAGNOSIS — M99.01 SEGMENTAL AND SOMATIC DYSFUNCTION OF CERVICAL REGION: Primary | ICD-10-CM

## 2021-10-26 DIAGNOSIS — M99.02 SEGMENTAL AND SOMATIC DYSFUNCTION OF THORACIC REGION: ICD-10-CM

## 2021-10-26 DIAGNOSIS — M99.03 SEGMENTAL AND SOMATIC DYSFUNCTION OF LUMBAR REGION: ICD-10-CM

## 2021-10-26 DIAGNOSIS — M54.2 CERVICALGIA: ICD-10-CM

## 2021-10-26 PROCEDURE — 98940 CHIROPRACT MANJ 1-2 REGIONS: CPT | Mod: AT | Performed by: CHIROPRACTOR

## 2021-10-27 NOTE — PROGRESS NOTES
Subjective Finding:    Chief compalint: Patient presents with:  Back Pain  Neck Pain  , Pain Scale: 7/10, Intensity: sharp, Duration: 2 months, Radiating: no.    Date of injury:     Activities that the pain restricts:   Home/household/hobbies/social activities: yes.  Work duties: yes.  Sleep: yes.  Makes symptoms better: rest.  Makes symptoms worse: cervical extension and cervical flexion.  Have you seen anyone else for the symptoms? No.  Work related: no.  Automobile related injury: no.    Objective and Assessment:    Posture Analysis:   High shoulder: .  Head tilt: .  High iliac crest: .  Head carriage: forward.  Thoracic Kyphosis: neutral.  Lumbar Lordosis: neutral.    Lumbar Range of Motion: .  Cervical Range of Motion: extension decreased, left lateral flexion decreased and right lateral flexion decreased.  Thoracic Range of Motion: .  Extremity Range of Motion: .    Palpation:   Traps: sharp pain, referred pain: no    Segmental dysfunction pre-treatment and treatment area: C2, C5, C6 and T2.      Assessment post-treatment:  Cervical: ROM increased.  Thoracic: ROM increased.  Lumbar: .    Comments: .      Complicating Factors: .    Procedure(s):  CMT:  54301 Chiropractic manipulative treatment 1-2 regions performed   Cervical: Diversified, See above for level, Supine and Thoracic: Diversified, See above for level, Prone    Modalities:  None performed this visit    Therapeutic procedures:  None    Plan:  Treatment plan: PRN.  Instructed patient: stretch as instructed at visit.  Short term goals: reduce pain.  Long term goals: restore normal function.  Prognosis: good.

## 2021-10-28 ENCOUNTER — OFFICE VISIT (OUTPATIENT)
Dept: CHIROPRACTIC MEDICINE | Facility: OTHER | Age: 61
End: 2021-10-28
Attending: CHIROPRACTOR
Payer: COMMERCIAL

## 2021-10-28 DIAGNOSIS — M54.2 CERVICALGIA: ICD-10-CM

## 2021-10-28 DIAGNOSIS — M99.01 SEGMENTAL AND SOMATIC DYSFUNCTION OF CERVICAL REGION: Primary | ICD-10-CM

## 2021-10-28 DIAGNOSIS — M99.02 SEGMENTAL AND SOMATIC DYSFUNCTION OF THORACIC REGION: ICD-10-CM

## 2021-10-28 PROCEDURE — 98940 CHIROPRACT MANJ 1-2 REGIONS: CPT | Mod: AT | Performed by: CHIROPRACTOR

## 2021-11-01 ENCOUNTER — OFFICE VISIT (OUTPATIENT)
Dept: CHIROPRACTIC MEDICINE | Facility: OTHER | Age: 61
End: 2021-11-01
Attending: CHIROPRACTOR
Payer: COMMERCIAL

## 2021-11-01 DIAGNOSIS — M99.01 SEGMENTAL AND SOMATIC DYSFUNCTION OF CERVICAL REGION: Primary | ICD-10-CM

## 2021-11-01 DIAGNOSIS — M99.02 SEGMENTAL AND SOMATIC DYSFUNCTION OF THORACIC REGION: ICD-10-CM

## 2021-11-01 DIAGNOSIS — M54.2 CERVICALGIA: ICD-10-CM

## 2021-11-01 PROCEDURE — 98940 CHIROPRACT MANJ 1-2 REGIONS: CPT | Mod: AT | Performed by: CHIROPRACTOR

## 2021-11-01 NOTE — PROGRESS NOTES
Subjective Finding:    Chief compalint: Patient presents with:  Neck Pain  , Pain Scale: 7/10, Intensity: sharp, Duration: 2 months, Radiating: no.    Date of injury:     Activities that the pain restricts:   Home/household/hobbies/social activities: yes.  Work duties: yes.  Sleep: yes.  Makes symptoms better: rest.  Makes symptoms worse: cervical extension and cervical flexion.  Have you seen anyone else for the symptoms? No.  Work related: no.  Automobile related injury: no.    Objective and Assessment:    Posture Analysis:   High shoulder: .  Head tilt: .  High iliac crest: .  Head carriage: forward.  Thoracic Kyphosis: neutral.  Lumbar Lordosis: neutral.    Lumbar Range of Motion: .  Cervical Range of Motion: extension decreased, left lateral flexion decreased and right lateral flexion decreased.  Thoracic Range of Motion: .  Extremity Range of Motion: .    Palpation:   Traps: sharp pain, referred pain: no    Segmental dysfunction pre-treatment and treatment area: C2, C5, C6 and T2.      Assessment post-treatment:  Cervical: ROM increased.  Thoracic: ROM increased.  Lumbar: .    Comments: .      Complicating Factors: .    Procedure(s):  CMT:  19023 Chiropractic manipulative treatment 1-2 regions performed   Cervical: Diversified, See above for level, Supine and Thoracic: Diversified, See above for level, Prone    Modalities:  None performed this visit    Therapeutic procedures:  None    Plan:  Treatment plan: PRN.  Instructed patient: stretch as instructed at visit.  Short term goals: reduce pain.  Long term goals: restore normal function.  Prognosis: good.

## 2021-11-01 NOTE — PROGRESS NOTES
Subjective Finding:    Chief compalint: Patient presents with:  Neck Pain  , Pain Scale: 7/10, Intensity: sharp, Duration: 2 months, Radiating: no.    Date of injury:     Activities that the pain restricts:   Home/household/hobbies/social activities: yes.  Work duties: yes.  Sleep: yes.  Makes symptoms better: rest.  Makes symptoms worse: cervical extension and cervical flexion.  Have you seen anyone else for the symptoms? No.  Work related: no.  Automobile related injury: no.    Objective and Assessment:    Posture Analysis:   High shoulder: .  Head tilt: .  High iliac crest: .  Head carriage: forward.  Thoracic Kyphosis: neutral.  Lumbar Lordosis: neutral.    Lumbar Range of Motion: .  Cervical Range of Motion: extension decreased, left lateral flexion decreased and right lateral flexion decreased.  Thoracic Range of Motion: .  Extremity Range of Motion: .    Palpation:   Traps: sharp pain, referred pain: no    Segmental dysfunction pre-treatment and treatment area: C2, C5, C6 and T2.      Assessment post-treatment:  Cervical: ROM increased.  Thoracic: ROM increased.  Lumbar: .    Comments: .      Complicating Factors: .    Procedure(s):  CMT:  08285 Chiropractic manipulative treatment 1-2 regions performed   Cervical: Diversified, See above for level, Supine and Thoracic: Diversified, See above for level, Prone    Modalities:  None performed this visit    Therapeutic procedures:  None    Plan:  Treatment plan: PRN.  Instructed patient: stretch as instructed at visit.  Short term goals: reduce pain.  Long term goals: restore normal function.  Prognosis: good.

## 2021-11-04 ENCOUNTER — OFFICE VISIT (OUTPATIENT)
Dept: CHIROPRACTIC MEDICINE | Facility: OTHER | Age: 61
End: 2021-11-04
Attending: CHIROPRACTOR
Payer: COMMERCIAL

## 2021-11-04 DIAGNOSIS — M99.02 SEGMENTAL AND SOMATIC DYSFUNCTION OF THORACIC REGION: ICD-10-CM

## 2021-11-04 DIAGNOSIS — M99.01 SEGMENTAL AND SOMATIC DYSFUNCTION OF CERVICAL REGION: Primary | ICD-10-CM

## 2021-11-04 DIAGNOSIS — M99.03 SEGMENTAL AND SOMATIC DYSFUNCTION OF LUMBAR REGION: ICD-10-CM

## 2021-11-04 DIAGNOSIS — M54.2 CERVICALGIA: ICD-10-CM

## 2021-11-04 PROCEDURE — 98941 CHIROPRACT MANJ 3-4 REGIONS: CPT | Mod: AT | Performed by: CHIROPRACTOR

## 2021-11-04 NOTE — PROGRESS NOTES
Subjective Finding:    Chief compalint: Patient presents with:  Neck Pain  , Pain Scale: 7/10, Intensity: sharp, Duration: 2 months, Radiating: no.    Date of injury:     Activities that the pain restricts:   Home/household/hobbies/social activities: yes.  Work duties: yes.  Sleep: yes.  Makes symptoms better: rest.  Makes symptoms worse: cervical extension and cervical flexion.  Have you seen anyone else for the symptoms? No.  Work related: no.  Automobile related injury: no.    Objective and Assessment:    Posture Analysis:   High shoulder: .  Head tilt: .  High iliac crest: .  Head carriage: forward.  Thoracic Kyphosis: neutral.  Lumbar Lordosis: neutral.    Lumbar Range of Motion: .  Cervical Range of Motion: extension decreased, left lateral flexion decreased and right lateral flexion decreased.  Thoracic Range of Motion: .  Extremity Range of Motion: .    Palpation:   Traps: sharp pain, referred pain: no    Segmental dysfunction pre-treatment and treatment area: C2, C5, C6 and T2.      Assessment post-treatment:  Cervical: ROM increased.  Thoracic: ROM increased.  Lumbar: .    Comments: .      Complicating Factors: .    Procedure(s):  CMT:  97949 Chiropractic manipulative treatment 1-2 regions performed   Cervical: Diversified, See above for level, Supine and Thoracic: Diversified, See above for level, Prone    Modalities:  None performed this visit    Therapeutic procedures:  None    Plan:  Treatment plan: PRN.  Instructed patient: stretch as instructed at visit.  Short term goals: reduce pain.  Long term goals: restore normal function.  Prognosis: good.

## 2021-11-08 ENCOUNTER — OFFICE VISIT (OUTPATIENT)
Dept: CHIROPRACTIC MEDICINE | Facility: OTHER | Age: 61
End: 2021-11-08
Attending: CHIROPRACTOR
Payer: COMMERCIAL

## 2021-11-08 DIAGNOSIS — M99.01 SEGMENTAL AND SOMATIC DYSFUNCTION OF CERVICAL REGION: Primary | ICD-10-CM

## 2021-11-08 DIAGNOSIS — M99.02 SEGMENTAL AND SOMATIC DYSFUNCTION OF THORACIC REGION: ICD-10-CM

## 2021-11-08 DIAGNOSIS — M54.2 CERVICALGIA: ICD-10-CM

## 2021-11-08 PROCEDURE — 98940 CHIROPRACT MANJ 1-2 REGIONS: CPT | Mod: AT | Performed by: CHIROPRACTOR

## 2021-11-08 NOTE — PROGRESS NOTES
Subjective Finding:    Chief compalint: Patient presents with:  Neck Pain  , Pain Scale: 7/10, Intensity: sharp, Duration: 2 months, Radiating: no.    Date of injury:     Activities that the pain restricts:   Home/household/hobbies/social activities: yes.  Work duties: yes.  Sleep: yes.  Makes symptoms better: rest.  Makes symptoms worse: cervical extension and cervical flexion.  Have you seen anyone else for the symptoms? No.  Work related: no.  Automobile related injury: no.    Objective and Assessment:    Posture Analysis:   High shoulder: .  Head tilt: .  High iliac crest: .  Head carriage: forward.  Thoracic Kyphosis: neutral.  Lumbar Lordosis: neutral.    Lumbar Range of Motion: .  Cervical Range of Motion: extension decreased, left lateral flexion decreased and right lateral flexion decreased.  Thoracic Range of Motion: .  Extremity Range of Motion: .    Palpation:   Traps: sharp pain, referred pain: no    Segmental dysfunction pre-treatment and treatment area: C2, C5, C6 and T2.      Assessment post-treatment:  Cervical: ROM increased.  Thoracic: ROM increased.  Lumbar: .    Comments: .      Complicating Factors: .    Procedure(s):  CMT:  49516 Chiropractic manipulative treatment 1-2 regions performed   Cervical: Diversified, See above for level, Supine and Thoracic: Diversified, See above for level, Prone    Modalities:  None performed this visit    Therapeutic procedures:  None    Plan:  Treatment plan: PRN.  Instructed patient: stretch as instructed at visit.  Short term goals: reduce pain.  Long term goals: restore normal function.  Prognosis: good.

## 2021-11-11 ENCOUNTER — OFFICE VISIT (OUTPATIENT)
Dept: CHIROPRACTIC MEDICINE | Facility: OTHER | Age: 61
End: 2021-11-11
Attending: CHIROPRACTOR
Payer: COMMERCIAL

## 2021-11-11 DIAGNOSIS — M99.01 SEGMENTAL AND SOMATIC DYSFUNCTION OF CERVICAL REGION: Primary | ICD-10-CM

## 2021-11-11 DIAGNOSIS — M99.02 SEGMENTAL AND SOMATIC DYSFUNCTION OF THORACIC REGION: ICD-10-CM

## 2021-11-11 DIAGNOSIS — M54.2 CERVICALGIA: ICD-10-CM

## 2021-11-11 PROCEDURE — 98940 CHIROPRACT MANJ 1-2 REGIONS: CPT | Mod: AT | Performed by: CHIROPRACTOR

## 2021-11-11 NOTE — PROGRESS NOTES
Subjective Finding:    Chief compalint: Patient presents with:  Neck Pain  , Pain Scale: 7/10, Intensity: sharp, Duration: 2 months, Radiating: no.    Date of injury:     Activities that the pain restricts:   Home/household/hobbies/social activities: yes.  Work duties: yes.  Sleep: yes.  Makes symptoms better: rest.  Makes symptoms worse: cervical extension and cervical flexion.  Have you seen anyone else for the symptoms? No.  Work related: no.  Automobile related injury: no.    Objective and Assessment:    Posture Analysis:   High shoulder: .  Head tilt: .  High iliac crest: .  Head carriage: forward.  Thoracic Kyphosis: neutral.  Lumbar Lordosis: neutral.    Lumbar Range of Motion: .  Cervical Range of Motion: extension decreased, left lateral flexion decreased and right lateral flexion decreased.  Thoracic Range of Motion: .  Extremity Range of Motion: .    Palpation:   Traps: sharp pain, referred pain: no    Segmental dysfunction pre-treatment and treatment area: C2, C5, C6 and T2.      Assessment post-treatment:  Cervical: ROM increased.  Thoracic: ROM increased.  Lumbar: .    Comments: .      Complicating Factors: .    Procedure(s):  CMT:  97792 Chiropractic manipulative treatment 1-2 regions performed   Cervical: Diversified, See above for level, Supine and Thoracic: Diversified, See above for level, Prone    Modalities:  None performed this visit    Therapeutic procedures:  None    Plan:  Treatment plan: PRN.  Instructed patient: stretch as instructed at visit.  Short term goals: reduce pain.  Long term goals: restore normal function.  Prognosis: good.

## 2021-11-15 ENCOUNTER — OFFICE VISIT (OUTPATIENT)
Dept: CHIROPRACTIC MEDICINE | Facility: OTHER | Age: 61
End: 2021-11-15
Attending: CHIROPRACTOR
Payer: COMMERCIAL

## 2021-11-15 DIAGNOSIS — M54.2 CERVICALGIA: ICD-10-CM

## 2021-11-15 DIAGNOSIS — M99.02 SEGMENTAL AND SOMATIC DYSFUNCTION OF THORACIC REGION: ICD-10-CM

## 2021-11-15 DIAGNOSIS — M99.01 SEGMENTAL AND SOMATIC DYSFUNCTION OF CERVICAL REGION: Primary | ICD-10-CM

## 2021-11-15 PROCEDURE — 98940 CHIROPRACT MANJ 1-2 REGIONS: CPT | Mod: AT | Performed by: CHIROPRACTOR

## 2021-11-15 NOTE — PROGRESS NOTES
Subjective Finding:    Chief compalint: Patient presents with:  Neck Pain  , Pain Scale: 7/10, Intensity: sharp, Duration: 2 months, Radiating: no.    Date of injury:     Activities that the pain restricts:   Home/household/hobbies/social activities: yes.  Work duties: yes.  Sleep: yes.  Makes symptoms better: rest.  Makes symptoms worse: cervical extension and cervical flexion.  Have you seen anyone else for the symptoms? No.  Work related: no.  Automobile related injury: no.    Objective and Assessment:    Posture Analysis:   High shoulder: .  Head tilt: .  High iliac crest: .  Head carriage: forward.  Thoracic Kyphosis: neutral.  Lumbar Lordosis: neutral.    Lumbar Range of Motion: .  Cervical Range of Motion: extension decreased, left lateral flexion decreased and right lateral flexion decreased.  Thoracic Range of Motion: .  Extremity Range of Motion: .    Palpation:   Traps: sharp pain, referred pain: no    Segmental dysfunction pre-treatment and treatment area: C2, C5, C6 and T2.      Assessment post-treatment:  Cervical: ROM increased.  Thoracic: ROM increased.  Lumbar: .    Comments: .      Complicating Factors: .    Procedure(s):  CMT:  95026 Chiropractic manipulative treatment 1-2 regions performed   Cervical: Diversified, See above for level, Supine and Thoracic: Diversified, See above for level, Prone    Modalities:  None performed this visit    Therapeutic procedures:  None    Plan:  Treatment plan: PRN.  Instructed patient: stretch as instructed at visit.  Short term goals: reduce pain.  Long term goals: restore normal function.  Prognosis: good.

## 2021-11-22 ENCOUNTER — OFFICE VISIT (OUTPATIENT)
Dept: CHIROPRACTIC MEDICINE | Facility: OTHER | Age: 61
End: 2021-11-22
Attending: CHIROPRACTOR
Payer: COMMERCIAL

## 2021-11-22 DIAGNOSIS — M54.50 ACUTE BILATERAL LOW BACK PAIN WITHOUT SCIATICA: ICD-10-CM

## 2021-11-22 DIAGNOSIS — M99.01 SEGMENTAL AND SOMATIC DYSFUNCTION OF CERVICAL REGION: Primary | ICD-10-CM

## 2021-11-22 DIAGNOSIS — M99.03 SEGMENTAL AND SOMATIC DYSFUNCTION OF LUMBAR REGION: ICD-10-CM

## 2021-11-22 DIAGNOSIS — M99.02 SEGMENTAL AND SOMATIC DYSFUNCTION OF THORACIC REGION: ICD-10-CM

## 2021-11-22 PROCEDURE — 98940 CHIROPRACT MANJ 1-2 REGIONS: CPT | Mod: AT | Performed by: CHIROPRACTOR

## 2021-11-24 ENCOUNTER — OFFICE VISIT (OUTPATIENT)
Dept: CHIROPRACTIC MEDICINE | Facility: OTHER | Age: 61
End: 2021-11-24
Attending: CHIROPRACTOR
Payer: COMMERCIAL

## 2021-11-24 DIAGNOSIS — M99.03 SEGMENTAL AND SOMATIC DYSFUNCTION OF LUMBAR REGION: Primary | ICD-10-CM

## 2021-11-24 DIAGNOSIS — M99.02 SEGMENTAL AND SOMATIC DYSFUNCTION OF THORACIC REGION: ICD-10-CM

## 2021-11-24 DIAGNOSIS — M99.01 SEGMENTAL AND SOMATIC DYSFUNCTION OF CERVICAL REGION: ICD-10-CM

## 2021-11-24 DIAGNOSIS — M54.41 ACUTE RIGHT-SIDED LOW BACK PAIN WITH RIGHT-SIDED SCIATICA: ICD-10-CM

## 2021-11-24 PROCEDURE — 98941 CHIROPRACT MANJ 3-4 REGIONS: CPT | Mod: AT | Performed by: CHIROPRACTOR

## 2021-11-29 ENCOUNTER — OFFICE VISIT (OUTPATIENT)
Dept: CHIROPRACTIC MEDICINE | Facility: OTHER | Age: 61
End: 2021-11-29
Attending: CHIROPRACTOR
Payer: COMMERCIAL

## 2021-11-29 DIAGNOSIS — M99.03 SEGMENTAL AND SOMATIC DYSFUNCTION OF LUMBAR REGION: Primary | ICD-10-CM

## 2021-11-29 DIAGNOSIS — M54.41 ACUTE RIGHT-SIDED LOW BACK PAIN WITH RIGHT-SIDED SCIATICA: ICD-10-CM

## 2021-11-29 DIAGNOSIS — M99.01 SEGMENTAL AND SOMATIC DYSFUNCTION OF CERVICAL REGION: ICD-10-CM

## 2021-11-29 DIAGNOSIS — M99.02 SEGMENTAL AND SOMATIC DYSFUNCTION OF THORACIC REGION: ICD-10-CM

## 2021-11-29 PROCEDURE — 98941 CHIROPRACT MANJ 3-4 REGIONS: CPT | Mod: AT | Performed by: CHIROPRACTOR

## 2021-11-29 NOTE — PROGRESS NOTES
Subjective Finding:    Chief compalint: Patient presents with:  Back Pain  , Pain Scale: 7/10, Intensity: sharp, Duration: 2 months, Radiating: no.    Date of injury:     Activities that the pain restricts:   Home/household/hobbies/social activities: yes.  Work duties: yes.  Sleep: yes.  Makes symptoms better: rest.  Makes symptoms worse: cervical extension and cervical flexion.  Have you seen anyone else for the symptoms? No.  Work related: no.  Automobile related injury: no.    Objective and Assessment:    Posture Analysis:   High shoulder: .  Head tilt: .  High iliac crest: .  Head carriage: forward.  Thoracic Kyphosis: neutral.  Lumbar Lordosis: neutral.    Lumbar Range of Motion: .  Cervical Range of Motion: extension decreased, left lateral flexion decreased and right lateral flexion decreased.  Thoracic Range of Motion: .  Extremity Range of Motion: .    Palpation:   Traps: sharp pain, referred pain: no    Segmental dysfunction pre-treatment and treatment area: C2, C5, C6 and T2.      Assessment post-treatment:  Cervical: ROM increased.  Thoracic: ROM increased.  Lumbar: .    Comments: .      Complicating Factors: .    Procedure(s):  CMT:  10083 Chiropractic manipulative treatment 1-2 regions performed   Cervical: Diversified, See above for level, Supine and Thoracic: Diversified, See above for level, Prone    Modalities:  None performed this visit    Therapeutic procedures:  None    Plan:  Treatment plan: PRN.  Instructed patient: stretch as instructed at visit.  Short term goals: reduce pain.  Long term goals: restore normal function.  Prognosis: good.

## 2021-12-02 ENCOUNTER — OFFICE VISIT (OUTPATIENT)
Dept: CHIROPRACTIC MEDICINE | Facility: OTHER | Age: 61
End: 2021-12-02
Attending: CHIROPRACTOR
Payer: COMMERCIAL

## 2021-12-02 ENCOUNTER — IMMUNIZATION (OUTPATIENT)
Dept: FAMILY MEDICINE | Facility: OTHER | Age: 61
End: 2021-12-02
Attending: FAMILY MEDICINE
Payer: COMMERCIAL

## 2021-12-02 DIAGNOSIS — M99.02 SEGMENTAL AND SOMATIC DYSFUNCTION OF THORACIC REGION: ICD-10-CM

## 2021-12-02 DIAGNOSIS — M54.50 ACUTE BILATERAL LOW BACK PAIN WITHOUT SCIATICA: ICD-10-CM

## 2021-12-02 DIAGNOSIS — M99.01 SEGMENTAL AND SOMATIC DYSFUNCTION OF CERVICAL REGION: ICD-10-CM

## 2021-12-02 DIAGNOSIS — M99.03 SEGMENTAL AND SOMATIC DYSFUNCTION OF LUMBAR REGION: Primary | ICD-10-CM

## 2021-12-02 PROCEDURE — 98941 CHIROPRACT MANJ 3-4 REGIONS: CPT | Mod: AT | Performed by: CHIROPRACTOR

## 2021-12-02 PROCEDURE — 91300 PR COVID VAC PFIZER DIL RECON 30 MCG/0.3 ML IM: CPT

## 2021-12-02 NOTE — PROGRESS NOTES
Subjective Finding:    Chief compalint: Patient presents with:  Back Pain: right sided  Neck Pain  , Pain Scale: 7/10, Intensity: sharp, Duration: 2 months, Radiating: no.    Date of injury:     Activities that the pain restricts:   Home/household/hobbies/social activities: yes.  Work duties: yes.  Sleep: yes.  Makes symptoms better: rest.  Makes symptoms worse: cervical extension and cervical flexion.  Have you seen anyone else for the symptoms? No.  Work related: no.  Automobile related injury: no.    Objective and Assessment:    Posture Analysis:   High shoulder: .  Head tilt: .  High iliac crest: .  Head carriage: forward.  Thoracic Kyphosis: neutral.  Lumbar Lordosis: neutral.    Lumbar Range of Motion: .  Cervical Range of Motion: extension decreased, left lateral flexion decreased and right lateral flexion decreased.  Thoracic Range of Motion: .  Extremity Range of Motion: .    Palpation:   Traps: sharp pain, referred pain: no    Segmental dysfunction pre-treatment and treatment area: C2, C5, C6 and T2.   L5  SI right    Assessment post-treatment:  Cervical: ROM increased.  Thoracic: ROM increased.  Lumbar: .    Comments: .      Complicating Factors: .    Procedure(s):  [facility]:  40530 Chiropractic manipulative treatment 1-2 regions performed   Cervical: Diversified, See above for level, Supine and Thoracic: Diversified, See above for level, Prone    Modalities:  None performed this visit    Therapeutic procedures:  None    Plan:  Treatment plan: PRN.  Instructed patient: stretch as instructed at visit.  Short term goals: reduce pain.  Long term goals: restore normal function.  Prognosis: good.

## 2021-12-02 NOTE — PROGRESS NOTES
Subjective Finding:    Chief compalint: Patient presents with:  Back Pain  , Pain Scale: 7/10, Intensity: sharp, Duration: 2 months, Radiating: no.    Date of injury:     Activities that the pain restricts:   Home/household/hobbies/social activities: yes.  Work duties: yes.  Sleep: yes.  Makes symptoms better: rest.  Makes symptoms worse: cervical extension and cervical flexion.  Have you seen anyone else for the symptoms? No.  Work related: no.  Automobile related injury: no.    Objective and Assessment:    Posture Analysis:   High shoulder: .  Head tilt: .  High iliac crest: .  Head carriage: forward.  Thoracic Kyphosis: neutral.  Lumbar Lordosis: neutral.    Lumbar Range of Motion: .  Cervical Range of Motion: extension decreased, left lateral flexion decreased and right lateral flexion decreased.  Thoracic Range of Motion: .  Extremity Range of Motion: .    Palpation:   Traps: sharp pain, referred pain: no    Segmental dysfunction pre-treatment and treatment area: C2, C5, C6 and T2.   L5  SI right    Assessment post-treatment:  Cervical: ROM increased.  Thoracic: ROM increased.  Lumbar: .    Comments: .      Complicating Factors: .    Procedure(s):  CMT:  77030 Chiropractic manipulative treatment 1-2 regions performed   Cervical: Diversified, See above for level, Supine and Thoracic: Diversified, See above for level, Prone    Modalities:  None performed this visit    Therapeutic procedures:  None    Plan:  Treatment plan: PRN.  Instructed patient: stretch as instructed at visit.  Short term goals: reduce pain.  Long term goals: restore normal function.  Prognosis: good.

## 2021-12-06 ENCOUNTER — OFFICE VISIT (OUTPATIENT)
Dept: CHIROPRACTIC MEDICINE | Facility: OTHER | Age: 61
End: 2021-12-06
Attending: CHIROPRACTOR
Payer: COMMERCIAL

## 2021-12-06 DIAGNOSIS — M54.2 CERVICALGIA: ICD-10-CM

## 2021-12-06 DIAGNOSIS — M99.03 SEGMENTAL AND SOMATIC DYSFUNCTION OF LUMBAR REGION: ICD-10-CM

## 2021-12-06 DIAGNOSIS — M99.01 SEGMENTAL AND SOMATIC DYSFUNCTION OF CERVICAL REGION: Primary | ICD-10-CM

## 2021-12-06 DIAGNOSIS — M99.02 SEGMENTAL AND SOMATIC DYSFUNCTION OF THORACIC REGION: ICD-10-CM

## 2021-12-06 PROCEDURE — 98941 CHIROPRACT MANJ 3-4 REGIONS: CPT | Mod: AT | Performed by: CHIROPRACTOR

## 2021-12-07 NOTE — PROGRESS NOTES
Subjective Finding:    Chief compalint: Patient presents with:  Back Pain  Neck Pain  , Pain Scale: 7/10, Intensity: sharp, Duration: 2 months, Radiating: no.    Date of injury:     Activities that the pain restricts:   Home/household/hobbies/social activities: yes.  Work duties: yes.  Sleep: yes.  Makes symptoms better: rest.  Makes symptoms worse: cervical extension and cervical flexion.  Have you seen anyone else for the symptoms? No.  Work related: no.  Automobile related injury: no.    Objective and Assessment:    Posture Analysis:   High shoulder: .  Head tilt: .  High iliac crest: .  Head carriage: forward.  Thoracic Kyphosis: neutral.  Lumbar Lordosis: neutral.    Lumbar Range of Motion: .  Cervical Range of Motion: extension decreased, left lateral flexion decreased and right lateral flexion decreased.  Thoracic Range of Motion: .  Extremity Range of Motion: .    Palpation:   Traps: sharp pain, referred pain: no    Segmental dysfunction pre-treatment and treatment area: C2, C5, C6 and T2.   L5  SI right    Assessment post-treatment:  Cervical: ROM increased.  Thoracic: ROM increased.  Lumbar: .    Comments: .      Complicating Factors: .    Procedure(s):  [facility]:  49009 Chiropractic manipulative treatment 1-2 regions performed   Cervical: Diversified, See above for level, Supine and Thoracic: Diversified, See above for level, Prone    Modalities:  None performed this visit    Therapeutic procedures:  None    Plan:  Treatment plan: PRN.  Instructed patient: stretch as instructed at visit.  Short term goals: reduce pain.  Long term goals: restore normal function.  Prognosis: good.

## 2021-12-07 NOTE — PROGRESS NOTES
Subjective Finding:    Chief compalint: Patient presents with:  Back Pain  Neck Pain  , Pain Scale: 7/10, Intensity: sharp, Duration: 2 months, Radiating: no.    Date of injury:     Activities that the pain restricts:   Home/household/hobbies/social activities: yes.  Work duties: yes.  Sleep: yes.  Makes symptoms better: rest.  Makes symptoms worse: cervical extension and cervical flexion.  Have you seen anyone else for the symptoms? No.  Work related: no.  Automobile related injury: no.    Objective and Assessment:    Posture Analysis:   High shoulder: .  Head tilt: .  High iliac crest: .  Head carriage: forward.  Thoracic Kyphosis: neutral.  Lumbar Lordosis: neutral.    Lumbar Range of Motion: .  Cervical Range of Motion: extension decreased, left lateral flexion decreased and right lateral flexion decreased.  Thoracic Range of Motion: .  Extremity Range of Motion: .    Palpation:   Traps: sharp pain, referred pain: no    Segmental dysfunction pre-treatment and treatment area: C2, C5, C6 and T2.   L5  SI right    Assessment post-treatment:  Cervical: ROM increased.  Thoracic: ROM increased.  Lumbar: .    Comments: .      Complicating Factors: .    Procedure(s):  [facility]:  51345 Chiropractic manipulative treatment 1-2 regions performed   Cervical: Diversified, See above for level, Supine and Thoracic: Diversified, See above for level, Prone    Modalities:  None performed this visit    Therapeutic procedures:  None    Plan:  Treatment plan: PRN.  Instructed patient: stretch as instructed at visit.  Short term goals: reduce pain.  Long term goals: restore normal function.  Prognosis: good.

## 2021-12-09 ENCOUNTER — OFFICE VISIT (OUTPATIENT)
Dept: CHIROPRACTIC MEDICINE | Facility: OTHER | Age: 61
End: 2021-12-09
Attending: FAMILY MEDICINE
Payer: COMMERCIAL

## 2021-12-09 DIAGNOSIS — M99.02 SEGMENTAL AND SOMATIC DYSFUNCTION OF THORACIC REGION: ICD-10-CM

## 2021-12-09 DIAGNOSIS — M99.01 SEGMENTAL AND SOMATIC DYSFUNCTION OF CERVICAL REGION: Primary | ICD-10-CM

## 2021-12-09 DIAGNOSIS — M99.03 SEGMENTAL AND SOMATIC DYSFUNCTION OF LUMBAR REGION: ICD-10-CM

## 2021-12-09 DIAGNOSIS — M54.2 CERVICALGIA: ICD-10-CM

## 2021-12-09 PROCEDURE — 98941 CHIROPRACT MANJ 3-4 REGIONS: CPT | Mod: AT | Performed by: CHIROPRACTOR

## 2021-12-13 NOTE — PROGRESS NOTES
Subjective Finding:    Chief compalint: Patient presents with:  Neck Pain  Back Pain  , Pain Scale: 7/10, Intensity: sharp, Duration: 2 months, Radiating: no.    Date of injury:     Activities that the pain restricts:   Home/household/hobbies/social activities: yes.  Work duties: yes.  Sleep: yes.  Makes symptoms better: rest.  Makes symptoms worse: cervical extension and cervical flexion.  Have you seen anyone else for the symptoms? No.  Work related: no.  Automobile related injury: no.    Objective and Assessment:    Posture Analysis:   High shoulder: .  Head tilt: .  High iliac crest: .  Head carriage: forward.  Thoracic Kyphosis: neutral.  Lumbar Lordosis: neutral.    Lumbar Range of Motion: .  Cervical Range of Motion: extension decreased, left lateral flexion decreased and right lateral flexion decreased.  Thoracic Range of Motion: .  Extremity Range of Motion: .    Palpation:   Traps: sharp pain, referred pain: no    Segmental dysfunction pre-treatment and treatment area: C2, C5, C6 and T2.   L5  SI right    Assessment post-treatment:  Cervical: ROM increased.  Thoracic: ROM increased.  Lumbar: .    Comments: .      Complicating Factors: .    Procedure(s):  [facility]:  41183 Chiropractic manipulative treatment 1-2 regions performed   Cervical: Diversified, See above for level, Supine and Thoracic: Diversified, See above for level, Prone    Modalities:  None performed this visit    Therapeutic procedures:  None    Plan:  Treatment plan: PRN.  Instructed patient: stretch as instructed at visit.  Short term goals: reduce pain.  Long term goals: restore normal function.  Prognosis: good.

## 2021-12-14 ENCOUNTER — OFFICE VISIT (OUTPATIENT)
Dept: CHIROPRACTIC MEDICINE | Facility: OTHER | Age: 61
End: 2021-12-14
Attending: CHIROPRACTOR
Payer: COMMERCIAL

## 2021-12-14 DIAGNOSIS — M99.01 SEGMENTAL AND SOMATIC DYSFUNCTION OF CERVICAL REGION: Primary | ICD-10-CM

## 2021-12-14 DIAGNOSIS — M99.02 SEGMENTAL AND SOMATIC DYSFUNCTION OF THORACIC REGION: ICD-10-CM

## 2021-12-14 DIAGNOSIS — M99.03 SEGMENTAL AND SOMATIC DYSFUNCTION OF LUMBAR REGION: ICD-10-CM

## 2021-12-14 DIAGNOSIS — M54.2 CERVICALGIA: ICD-10-CM

## 2021-12-14 PROCEDURE — 98941 CHIROPRACT MANJ 3-4 REGIONS: CPT | Mod: AT | Performed by: CHIROPRACTOR

## 2021-12-14 NOTE — PROGRESS NOTES
Subjective Finding:    Chief compalint: Patient presents with:  Neck Pain  Back Pain  , Pain Scale: 7/10, Intensity: sharp, Duration: 2 months, Radiating: no.    Date of injury:     Activities that the pain restricts:   Home/household/hobbies/social activities: yes.  Work duties: yes.  Sleep: yes.  Makes symptoms better: rest.  Makes symptoms worse: cervical extension and cervical flexion.  Have you seen anyone else for the symptoms? No.  Work related: no.  Automobile related injury: no.    Objective and Assessment:    Posture Analysis:   High shoulder: .  Head tilt: .  High iliac crest: .  Head carriage: forward.  Thoracic Kyphosis: neutral.  Lumbar Lordosis: neutral.    Lumbar Range of Motion: .  Cervical Range of Motion: extension decreased, left lateral flexion decreased and right lateral flexion decreased.  Thoracic Range of Motion: .  Extremity Range of Motion: .    Palpation:   Traps: sharp pain, referred pain: no    Segmental dysfunction pre-treatment and treatment area: C2, C5, C6 and T2.   L5  SI right    Assessment post-treatment:  Cervical: ROM increased.  Thoracic: ROM increased.  Lumbar: .    Comments: .      Complicating Factors: .    Procedure(s):  [facility]:  67603 Chiropractic manipulative treatment 1-2 regions performed   Cervical: Diversified, See above for level, Supine and Thoracic: Diversified, See above for level, Prone    Modalities:  None performed this visit    Therapeutic procedures:  None    Plan:  Treatment plan: PRN.  Instructed patient: stretch as instructed at visit.  Short term goals: reduce pain.  Long term goals: restore normal function.  Prognosis: good.

## 2021-12-16 ENCOUNTER — OFFICE VISIT (OUTPATIENT)
Dept: CHIROPRACTIC MEDICINE | Facility: OTHER | Age: 61
End: 2021-12-16
Attending: CHIROPRACTOR
Payer: COMMERCIAL

## 2021-12-16 DIAGNOSIS — M99.02 SEGMENTAL AND SOMATIC DYSFUNCTION OF THORACIC REGION: ICD-10-CM

## 2021-12-16 DIAGNOSIS — M99.01 SEGMENTAL AND SOMATIC DYSFUNCTION OF CERVICAL REGION: Primary | ICD-10-CM

## 2021-12-16 DIAGNOSIS — M99.03 SEGMENTAL AND SOMATIC DYSFUNCTION OF LUMBAR REGION: ICD-10-CM

## 2021-12-16 DIAGNOSIS — M54.2 CERVICALGIA: ICD-10-CM

## 2021-12-16 PROCEDURE — 98941 CHIROPRACT MANJ 3-4 REGIONS: CPT | Mod: AT | Performed by: CHIROPRACTOR

## 2021-12-16 NOTE — PROGRESS NOTES
Subjective Finding:    Chief compalint: Patient presents with:  Back Pain  Neck Pain  , Pain Scale: 7/10, Intensity: sharp, Duration: 2 months, Radiating: no.    Date of injury:     Activities that the pain restricts:   Home/household/hobbies/social activities: yes.  Work duties: yes.  Sleep: yes.  Makes symptoms better: rest.  Makes symptoms worse: cervical extension and cervical flexion.  Have you seen anyone else for the symptoms? No.  Work related: no.  Automobile related injury: no.    Objective and Assessment:    Posture Analysis:   High shoulder: .  Head tilt: .  High iliac crest: .  Head carriage: forward.  Thoracic Kyphosis: neutral.  Lumbar Lordosis: neutral.    Lumbar Range of Motion: .  Cervical Range of Motion: extension decreased, left lateral flexion decreased and right lateral flexion decreased.  Thoracic Range of Motion: .  Extremity Range of Motion: .    Palpation:   Traps: sharp pain, referred pain: no    Segmental dysfunction pre-treatment and treatment area: C2, C5, C6 and T2.   L5  SI right    Assessment post-treatment:  Cervical: ROM increased.  Thoracic: ROM increased.  Lumbar: .    Comments: .      Complicating Factors: .    Procedure(s):  [facility]:  20384 Chiropractic manipulative treatment 1-2 regions performed   Cervical: Diversified, See above for level, Supine and Thoracic: Diversified, See above for level, Prone    Modalities:  None performed this visit    Therapeutic procedures:  None    Plan:  Treatment plan: PRN.  Instructed patient: stretch as instructed at visit.  Short term goals: reduce pain.  Long term goals: restore normal function.  Prognosis: good.

## 2021-12-22 ENCOUNTER — OFFICE VISIT (OUTPATIENT)
Dept: CHIROPRACTIC MEDICINE | Facility: OTHER | Age: 61
End: 2021-12-22
Attending: CHIROPRACTOR
Payer: COMMERCIAL

## 2021-12-22 DIAGNOSIS — M99.03 SEGMENTAL AND SOMATIC DYSFUNCTION OF LUMBAR REGION: ICD-10-CM

## 2021-12-22 DIAGNOSIS — M99.01 SEGMENTAL AND SOMATIC DYSFUNCTION OF CERVICAL REGION: Primary | ICD-10-CM

## 2021-12-22 DIAGNOSIS — M99.02 SEGMENTAL AND SOMATIC DYSFUNCTION OF THORACIC REGION: ICD-10-CM

## 2021-12-22 DIAGNOSIS — M54.2 CERVICALGIA: ICD-10-CM

## 2021-12-22 PROCEDURE — 98941 CHIROPRACT MANJ 3-4 REGIONS: CPT | Mod: AT | Performed by: CHIROPRACTOR

## 2021-12-27 ENCOUNTER — TELEPHONE (OUTPATIENT)
Dept: OTOLARYNGOLOGY | Facility: OTHER | Age: 61
End: 2021-12-27
Payer: COMMERCIAL

## 2021-12-27 DIAGNOSIS — J01.90 ACUTE SINUSITIS WITH SYMPTOMS > 10 DAYS: Primary | ICD-10-CM

## 2021-12-27 RX ORDER — AZITHROMYCIN 250 MG/1
TABLET, FILM COATED ORAL
Qty: 6 TABLET | Refills: 0 | Status: SHIPPED | OUTPATIENT
Start: 2021-12-27 | End: 2022-04-18

## 2021-12-27 NOTE — TELEPHONE ENCOUNTER
Pt calls and states that he is having bad sinuses again.  He is very plugged in his sinuses and ears.  He is wondering if you could call in a z-pac for him to [facility] in [place].  Please advise.

## 2021-12-27 NOTE — TELEPHONE ENCOUNTER
He states that this has been going on for 1 month and is getting worse now.  He was using his Cetirizine with no help.  He stopped using his nasal sprays because they are plugging him up.  He is using some Vicks moisturizing that helps some.  Q-tip comes out yellow when he uses.

## 2021-12-27 NOTE — TELEPHONE ENCOUNTER
Please call him, how long have symptoms been present and what is currently using for symptoms?  Thanks JS

## 2021-12-27 NOTE — PROGRESS NOTES
Subjective Finding:    Chief compalint: Patient presents with:  Back Pain  , Pain Scale: 7/10, Intensity: sharp, Duration: 2 months, Radiating: no.    Date of injury:     Activities that the pain restricts:   Home/household/hobbies/social activities: yes.  Work duties: yes.  Sleep: yes.  Makes symptoms better: rest.  Makes symptoms worse: cervical extension and cervical flexion.  Have you seen anyone else for the symptoms? No.  Work related: no.  Automobile related injury: no.    Objective and Assessment:    Posture Analysis:   High shoulder: .  Head tilt: .  High iliac crest: .  Head carriage: forward.  Thoracic Kyphosis: neutral.  Lumbar Lordosis: neutral.    Lumbar Range of Motion: .  Cervical Range of Motion: extension decreased, left lateral flexion decreased and right lateral flexion decreased.  Thoracic Range of Motion: .  Extremity Range of Motion: .    Palpation:   Traps: sharp pain, referred pain: no    Segmental dysfunction pre-treatment and treatment area: C2, C5, C6 and T2.   L5  SI right    Assessment post-treatment:  Cervical: ROM increased.  Thoracic: ROM increased.  Lumbar: .    Comments: .      Complicating Factors: .    Procedure(s):  CMT:  93633 Chiropractic manipulative treatment 1-2 regions performed   Cervical: Diversified, See above for level, Supine and Thoracic: Diversified, See above for level, Prone    Modalities:  None performed this visit    Therapeutic procedures:  None    Plan:  Treatment plan: PRN.  Instructed patient: stretch as instructed at visit.  Short term goals: reduce pain.  Long term goals: restore normal function.  Prognosis: good.

## 2022-01-03 ENCOUNTER — TELEPHONE (OUTPATIENT)
Dept: OTOLARYNGOLOGY | Facility: OTHER | Age: 62
End: 2022-01-03
Payer: COMMERCIAL

## 2022-01-03 ENCOUNTER — OFFICE VISIT (OUTPATIENT)
Dept: OTOLARYNGOLOGY | Facility: OTHER | Age: 62
End: 2022-01-03
Attending: NURSE PRACTITIONER
Payer: COMMERCIAL

## 2022-01-03 VITALS
OXYGEN SATURATION: 94 % | DIASTOLIC BLOOD PRESSURE: 88 MMHG | HEART RATE: 78 BPM | BODY MASS INDEX: 31.21 KG/M2 | TEMPERATURE: 97.2 F | HEIGHT: 70 IN | WEIGHT: 218 LBS | SYSTOLIC BLOOD PRESSURE: 146 MMHG

## 2022-01-03 DIAGNOSIS — J01.40 SUBACUTE PANSINUSITIS: ICD-10-CM

## 2022-01-03 DIAGNOSIS — Z20.822 COVID-19 RULED OUT: Primary | ICD-10-CM

## 2022-01-03 LAB
FLUAV RNA SPEC QL NAA+PROBE: NEGATIVE
FLUBV RNA RESP QL NAA+PROBE: NEGATIVE
RSV RNA SPEC NAA+PROBE: NEGATIVE
SARS-COV-2 RNA RESP QL NAA+PROBE: NEGATIVE

## 2022-01-03 PROCEDURE — 87637 SARSCOV2&INF A&B&RSV AMP PRB: CPT | Mod: ZL | Performed by: NURSE PRACTITIONER

## 2022-01-03 PROCEDURE — 99214 OFFICE O/P EST MOD 30 MIN: CPT | Mod: 25 | Performed by: NURSE PRACTITIONER

## 2022-01-03 PROCEDURE — G0463 HOSPITAL OUTPT CLINIC VISIT: HCPCS | Mod: 25

## 2022-01-03 PROCEDURE — 31231 NASAL ENDOSCOPY DX: CPT | Performed by: NURSE PRACTITIONER

## 2022-01-03 RX ORDER — PREDNISONE 10 MG/1
TABLET ORAL
Qty: 20 TABLET | Refills: 0 | Status: SHIPPED | OUTPATIENT
Start: 2022-01-03 | End: 2022-04-18

## 2022-01-03 RX ORDER — DOXYCYCLINE 100 MG/1
100 CAPSULE ORAL 2 TIMES DAILY
Qty: 20 CAPSULE | Refills: 0 | Status: SHIPPED | OUTPATIENT
Start: 2022-01-03 | End: 2022-01-13

## 2022-01-03 RX ORDER — BUDESONIDE 0.5 MG/2ML
INHALANT ORAL
Qty: 60 ML | Refills: 1 | Status: SHIPPED | OUTPATIENT
Start: 2022-01-03 | End: 2022-02-08

## 2022-01-03 ASSESSMENT — MIFFLIN-ST. JEOR: SCORE: 1795.09

## 2022-01-03 NOTE — PATIENT INSTRUCTIONS
Start prednisone 30 mg daily x 5 days, then 20 mg daily x 2 days, 10 mg daily x 1 day then discontinue    Start taking zyrtec again  Start budesonide nasal rinses  Budesonide rinses:  Budesonide nasal saline irrigation per instructions:  -Obtain [facility] Sinus rinse over the counter.    -Use warm distilled water and 2 packets of the salt solution that comes with the bottle, dissolve in bottle up to the 240 mL justin.  -Add 1 vial of budesonide.  -Irrigate each side of your nose leaning over the sink, using 1/3 to 1/2 the volume of the bottle in each nostril every irrigation.  Irrigate 2 times daily.  -If additional rinses are needed/recommended, you may use the plan [facility] Sinus irrigation without the use of added budesonide.     Start flonase or nasacort 2 sprays daily    We will call you with covid results    Stop over the counter decongestant spray  If no improvement in 2-3 days after starting prednisone, Doxycycline as prescribed      Thank you for allowing [facility] and our ENT team to participate in your care.  If your medications are too expensive, please call my nurse at the number listed below.  We can possibly change this medication.    If you have a scheduling or an appointment question please contact our Health Unit Coordinator at their direct line [phone]  ALL nursing questions or concerns can be directed to my Nurse Monique [phone].

## 2022-01-03 NOTE — TELEPHONE ENCOUNTER
Yellow drainage from nose typically does not mean a sinus infection.  Also do not recommend use of q-tips in ears.  If he has tim otorrhea, otalgia, change in hearing, etc or facial pain/pressure I would like to see him in clinic for evaluation.  I do not recommend any further abx until he is seen. Thanks JS

## 2022-01-03 NOTE — LETTER
1/3/2022         RE: Villa Ruiz  [address]  [address]  [facility] [address]        Dear Colleague,    Thank you for referring your patient, Villa Ruiz, to the [facility]. Please see a copy of my visit note below.    Otolaryngology Note         Chief Complaint:     Patient presents with:  Ent Problem  Sinus Problem           History of Present Illness:     Villa Ruiz is a 61 year old male seen today for concerns for nasal congestion for the past 1.5 months.  He cannot breath through the nose.  He denies facial pain or pressure    He reports he was treated with zpack recently with improvement.     He has had severe congestion for the past 1.5 months.  He has been having constant     He reports his nose drips from October to May 1st     He reports flonase is plugging him up more.    He reports he was using equate nasal decongestant last couple of days but didn't use today, cautioned against repeated use of OTC decongestant, can cause rebound congestion.  Not currently on zyrtec.     He has used steamer with chanelle tabs and this didn't improve     He has not been using any nasal rinses    Symptoms are worse on right side.     He has a history of nasal fracture in the distant past.  No surgical interventions.      No current imaging of sinuses.      I last saw Villa in October 2021 for sinusitis.  He had just completed Zpack with improvement.  At that time he reported that he was breathing through his nose well.      Nasopharyngoscopy completed by Yee in 2019 showed no NP mass, eustachian tubes patent.     He is a current daily smoker  Covid vaccinated         Medications:     Current Outpatient Rx   Medication Sig Dispense Refill     albuterol (PROAIR HFA/PROVENTIL HFA/VENTOLIN HFA) 108 (90 Base) MCG/ACT inhaler INHALE 2 PUFFS BY MOUTH EVERY 6 HOURS 18 g 1     budesonide (PULMICORT) 0.5 MG/2ML neb solution Mix 240 ml Cedric Med Sinus rinse, add 0.5 mg budesonide vial, rinse 1/2 bottle in  each nostril twice daily 60 mL 1     cetirizine (ZYRTEC) 10 MG tablet Take 1 tablet (10 mg) by mouth daily 90 tablet 3     diclofenac (VOLTAREN) 1 % topical gel APPLY  TOPICALLY AS NEEDED FOR MODERATE FOR PAIN 100 g 0     diclofenac (VOLTAREN) 1 % topical gel Apply topically daily as needed  11     doxycycline hyclate (VIBRAMYCIN) 100 MG capsule Take 1 capsule (100 mg) by mouth 2 times daily for 10 days 20 capsule 0     fluticasone (FLONASE) 50 MCG/ACT nasal spray Spray 1 spray into both nostrils daily 16 g 1     predniSONE (DELTASONE) 10 MG tablet Take 30 mg daily x 5 days, then 20 mg daily x 2 days, then 10 mg daily x 1 day then discontinue 20 tablet 0     triamcinolone (NASACORT) 55 MCG/ACT nasal aerosol Spray 2 sprays into both nostrils daily 16.5 g 3     azithromycin (ZITHROMAX Z-RIDDHI) 250 MG tablet Two tablets on the first day, then one tablet daily for the next 4 days (Patient not taking: Reported on 1/3/2022) 6 tablet 0     azithromycin (ZITHROMAX) 250 MG tablet Take 500 mg on day 1, then 250 mg daily x 4 days and discontinue (Patient not taking: Reported on 1/3/2022) 6 tablet 0            Allergies:     Allergies: Augmentin          Past Medical History:     History reviewed. No pertinent past medical history.         Past Surgical History:     History reviewed. No pertinent surgical history.    ENT family history reviewed         Social History:     Social History     Tobacco Use     Smoking status: Current Every Day Smoker     Packs/day: 0.75     Years: 6.00     Pack years: 4.50     Types: Cigarettes, Cigars     Start date: 1/1/2012     Smokeless tobacco: Never Used     Tobacco comment: pt denied QP 6/23/20   Substance Use Topics     Alcohol use: No     Alcohol/week: 0.0 standard drinks     Drug use: No            Review of Systems:     ROS: See HPI         Physical Exam:     BP (!) 146/88 (BP Location: Right arm, Patient Position: Sitting, Cuff Size: Adult Large)   Pulse 78   Temp 97.2  F (36.2  C)  "(Tympanic)   Ht 1.778 m (5' 10\")   Wt 98.9 kg (218 lb)   SpO2 94%   BMI 31.28 kg/m      General - The patient is well nourished and well developed, and appears to have good nutritional status.  Alert and oriented to person and place, answers questions and cooperates with examination appropriately.   Head and Face - Normocephalic and atraumatic, with no gross asymmetry noted.  The facial nerve is intact, with strong symmetric movements.  Voice and Breathing - The patient was breathing comfortably without the use of accessory muscles. There was no wheezing, stridor. The patients voice was clear and strong, and had appropriate pitch and quality.  Ears - External ear normal. Canals are patent. Right tympanic membrane is intact without effusion, retraction or mass. Left tympanic membrane is intact without effusion, retraction or mass.  Eyes - Extraocular movements intact, sclera were not icteric or injected.  Mouth - Examination of the oral cavity showed pink, healthy oral mucosa.  No lesions or ulcerations noted. The tongue was mobile and midline.   Throat - The walls of the oropharynx were smooth, pink, moist, symmetric, and had no lesions or ulcerations.  The tonsillar pillars and soft palate were symmetric. The uvula was midline on elevation.    Neck - Normal midline excursion of the laryngotracheal complex during swallowing.  Full range of motion on passive movement.  Palpation of the occipital, submental, submandibular, internal jugular chain, and supraclavicular nodes did not demonstrate any abnormal lymph nodes or masses.  Palpation of the thyroid was soft and smooth, with no nodules or goiter appreciated.  The trachea was mobile and midline.  Nose - External contour is symmetric, no gross deflection or scars.  Nasal mucosa is pink and moist with no abnormal mucus.  The septum and turbinates were evaluated with nasal speculum, bilateral IT edema with clear drainage.   Lungs CTA    To evaluate the nose and " sinuses, I performed rigid nasal endoscopy.  I sprayed both nares with 2 sprays lidocaine and neosynephrine.     I began with the RIGHT side using a 0 degree rigid nasal endoscope, and then similarly examined the LEFT side     Findings:  Inferior turbinates:  bilateral IT's are edematous with clear drainage, right > left.   Minimal view of middle turbinate and middle meatus due to IT swelling.  No obvious purulence, no polyposis  Minimal view of the superior meatus due to swelling  Unable to view sphenoethmoidal recess or NP  The patient tolerated the procedure well     Results for orders placed or performed in visit on 01/03/22   Symptomatic; Unknown Influenza A/B & SARS-CoV2 (COVID-19) Virus PCR Multiplex Nose     Status: Normal    Specimen: Nose; Swab   Result Value Ref Range    Influenza A PCR Negative Negative    Influenza B PCR Negative Negative    RSV PCR Negative Negative    SARS CoV2 PCR Negative Negative    Narrative    Testing was performed using the Xpert Xpress CoV2/Flu/RSV Assay on the [employer] GeneXpert Instrument. This test should be ordered for the detection of SARS-CoV-2 and influenza viruses in individuals who meet clinical and/or epidemiological criteria. Test performance is unknown in asymptomatic patients. This test is for in vitro diagnostic use under the FDA EUA for laboratories certified under CLIA to perform high or moderate complexity testing. This test has not been FDA cleared or approved. A negative result does not rule out the presence of PCR inhibitors in the specimen or target RNA in concentration below the limit of detection for the assay. If only one viral target is positive but coinfection with multiple targets is suspected, the sample should be re-tested with another FDA cleared, approved, or authorized test, if coinfection would change clinical management. This test was validated by the [facility]. These laboratories are certified under the Clinical  Laboratory  Improvement Amendments of 1988 (CLIA-88) as qualified to perform high complexity laboratory testing.            Assessment and Plan:       ICD-10-CM    1. COVID-19 ruled out  Z20.822 Symptomatic; Unknown Influenza A/B & SARS-CoV2 (COVID-19) Virus PCR Multiplex Nose     Symptomatic; Unknown Influenza A/B & SARS-CoV2 (COVID-19) Virus PCR Multiplex Nose   2. Subacute pansinusitis  J01.40 doxycycline hyclate (VIBRAMYCIN) 100 MG capsule     predniSONE (DELTASONE) 10 MG tablet     budesonide (PULMICORT) 0.5 MG/2ML neb solution     Start prednisone 30 mg daily x 5 days, then 20 mg daily x 2 days, 10 mg daily x 1 day then discontinue    Risks of oral steroid use were discussed and include psychiatric/mood changes, insomnia, stomach ulcers and potential GI bleeding, blood sugar elevation/worsening diabetes, hip/bone necrosis or bone demineralization.      Start taking zyrtec again  Start budesonide nasal rinses  Budesonide rinses:  Budesonide nasal saline irrigation per instructions:  -Obtain Cedric Med Sinus rinse over the counter.    -Use warm distilled water and 2 packets of the salt solution that comes with the bottle, dissolve in bottle up to the 240 mL justin.  -Add 1 vial of budesonide.  -Irrigate each side of your nose leaning over the sink, using 1/3 to 1/2 the volume of the bottle in each nostril every irrigation.  Irrigate 2 times daily.  -If additional rinses are needed/recommended, you may use the plan Cedric Med Sinus irrigation without the use of added budesonide.     Start flonase or nasacort 2 sprays daily    Stop over the counter decongestant spray  If no improvement in 2-3 days after starting prednisone, Doxycycline as prescribed  Follow up if symptoms are not resolving.  If further sinusitis, recommend CT sinus.     Veronica GARCIA  [facility]        Scope number [id number]    Patty Blue LPN       Again, thank you for allowing me to participate in the care of your patient.         Sincerely,        Veronica Chou, NP

## 2022-01-03 NOTE — TELEPHONE ENCOUNTER
Patient called to check in please give him a call back at this time so he can let you know what is going on.  Phone number is [phone]

## 2022-01-03 NOTE — PROGRESS NOTES
Otolaryngology Note         Chief Complaint:     Patient presents with:  Ent Problem  Sinus Problem           History of Present Illness:     Villa Ruiz is a 61 year old male seen today for concerns for nasal congestion for the past 1.5 months.  He cannot breath through the nose.  He denies facial pain or pressure    He reports he was treated with zpack recently with improvement.     He has had severe congestion for the past 1.5 months.  He has been having constant     He reports his nose drips from October to May 1st     He reports flonase is plugging him up more.    He reports he was using equate nasal decongestant last couple of days but didn't use today, cautioned against repeated use of OTC decongestant, can cause rebound congestion.  Not currently on zyrtec.     He has used steamer with chanelle tabs and this didn't improve     He has not been using any nasal rinses    Symptoms are worse on right side.     He has a history of nasal fracture in the distant past.  No surgical interventions.      No current imaging of sinuses.      I last saw Villa in October 2021 for sinusitis.  He had just completed Zpack with improvement.  At that time he reported that he was breathing through his nose well.      Nasopharyngoscopy completed by Yee in 2019 showed no NP mass, eustachian tubes patent.     He is a current daily smoker  Covid vaccinated         Medications:     Current Outpatient Rx   Medication Sig Dispense Refill     albuterol (PROAIR HFA/PROVENTIL HFA/VENTOLIN HFA) 108 (90 Base) MCG/ACT inhaler INHALE 2 PUFFS BY MOUTH EVERY 6 HOURS 18 g 1     budesonide (PULMICORT) 0.5 MG/2ML neb solution Mix 240 ml Cedric Med Sinus rinse, add 0.5 mg budesonide vial, rinse 1/2 bottle in each nostril twice daily 60 mL 1     cetirizine (ZYRTEC) 10 MG tablet Take 1 tablet (10 mg) by mouth daily 90 tablet 3     diclofenac (VOLTAREN) 1 % topical gel APPLY  TOPICALLY AS NEEDED FOR MODERATE FOR PAIN 100 g 0     diclofenac (VOLTAREN) 1 %  "topical gel Apply topically daily as needed  11     doxycycline hyclate (VIBRAMYCIN) 100 MG capsule Take 1 capsule (100 mg) by mouth 2 times daily for 10 days 20 capsule 0     fluticasone (FLONASE) 50 MCG/ACT nasal spray Spray 1 spray into both nostrils daily 16 g 1     predniSONE (DELTASONE) 10 MG tablet Take 30 mg daily x 5 days, then 20 mg daily x 2 days, then 10 mg daily x 1 day then discontinue 20 tablet 0     triamcinolone (NASACORT) 55 MCG/ACT nasal aerosol Spray 2 sprays into both nostrils daily 16.5 g 3     azithromycin (ZITHROMAX Z-RIDDHI) 250 MG tablet Two tablets on the first day, then one tablet daily for the next 4 days (Patient not taking: Reported on 1/3/2022) 6 tablet 0     azithromycin (ZITHROMAX) 250 MG tablet Take 500 mg on day 1, then 250 mg daily x 4 days and discontinue (Patient not taking: Reported on 1/3/2022) 6 tablet 0            Allergies:     Allergies: Augmentin          Past Medical History:     History reviewed. No pertinent past medical history.         Past Surgical History:     History reviewed. No pertinent surgical history.    ENT family history reviewed         Social History:     Social History     Tobacco Use     Smoking status: Current Every Day Smoker     Packs/day: 0.75     Years: 6.00     Pack years: 4.50     Types: Cigarettes, Cigars     Start date: 1/1/2012     Smokeless tobacco: Never Used     Tobacco comment: pt denied QP 6/23/20   Substance Use Topics     Alcohol use: No     Alcohol/week: 0.0 standard drinks     Drug use: No            Review of Systems:     ROS: See HPI         Physical Exam:     BP (!) 146/88 (BP Location: Right arm, Patient Position: Sitting, Cuff Size: Adult Large)   Pulse 78   Temp 97.2  F (36.2  C) (Tympanic)   Ht 1.778 m (5' 10\")   Wt 98.9 kg (218 lb)   SpO2 94%   BMI 31.28 kg/m      General - The patient is well nourished and well developed, and appears to have good nutritional status.  Alert and oriented to person and place, answers " questions and cooperates with examination appropriately.   Head and Face - Normocephalic and atraumatic, with no gross asymmetry noted.  The facial nerve is intact, with strong symmetric movements.  Voice and Breathing - The patient was breathing comfortably without the use of accessory muscles. There was no wheezing, stridor. The patients voice was clear and strong, and had appropriate pitch and quality.  Ears - External ear normal. Canals are patent. Right tympanic membrane is intact without effusion, retraction or mass. Left tympanic membrane is intact without effusion, retraction or mass.  Eyes - Extraocular movements intact, sclera were not icteric or injected.  Mouth - Examination of the oral cavity showed pink, healthy oral mucosa.  No lesions or ulcerations noted. The tongue was mobile and midline.   Throat - The walls of the oropharynx were smooth, pink, moist, symmetric, and had no lesions or ulcerations.  The tonsillar pillars and soft palate were symmetric. The uvula was midline on elevation.    Neck - Normal midline excursion of the laryngotracheal complex during swallowing.  Full range of motion on passive movement.  Palpation of the occipital, submental, submandibular, internal jugular chain, and supraclavicular nodes did not demonstrate any abnormal lymph nodes or masses.  Palpation of the thyroid was soft and smooth, with no nodules or goiter appreciated.  The trachea was mobile and midline.  Nose - External contour is symmetric, no gross deflection or scars.  Nasal mucosa is pink and moist with no abnormal mucus.  The septum and turbinates were evaluated with nasal speculum, bilateral IT edema with clear drainage.   Lungs CTA    To evaluate the nose and sinuses, I performed rigid nasal endoscopy.  I sprayed both nares with 2 sprays lidocaine and neosynephrine.     I began with the RIGHT side using a 0 degree rigid nasal endoscope, and then similarly examined the LEFT side     Findings:  Inferior  turbinates:  bilateral IT's are edematous with clear drainage, right > left.   Minimal view of middle turbinate and middle meatus due to IT swelling.  No obvious purulence, no polyposis  Minimal view of the superior meatus due to swelling  Unable to view sphenoethmoidal recess or NP  The patient tolerated the procedure well     Results for orders placed or performed in visit on 01/03/22   Symptomatic; Unknown Influenza A/B & SARS-CoV2 (COVID-19) Virus PCR Multiplex Nose     Status: Normal    Specimen: Nose; Swab   Result Value Ref Range    Influenza A PCR Negative Negative    Influenza B PCR Negative Negative    RSV PCR Negative Negative    SARS CoV2 PCR Negative Negative    Narrative    Testing was performed using the Xpert Xpress CoV2/Flu/RSV Assay on the [employer] GeneXpert Instrument. This test should be ordered for the detection of SARS-CoV-2 and influenza viruses in individuals who meet clinical and/or epidemiological criteria. Test performance is unknown in asymptomatic patients. This test is for in vitro diagnostic use under the FDA EUA for laboratories certified under CLIA to perform high or moderate complexity testing. This test has not been FDA cleared or approved. A negative result does not rule out the presence of PCR inhibitors in the specimen or target RNA in concentration below the limit of detection for the assay. If only one viral target is positive but coinfection with multiple targets is suspected, the sample should be re-tested with another FDA cleared, approved, or authorized test, if coinfection would change clinical management. This test was validated by the [facility]. These laboratories are certified under the Clinical  Laboratory Improvement Amendments of 1988 (CLIA-88) as qualified to perform high complexity laboratory testing.            Assessment and Plan:       ICD-10-CM    1. COVID-19 ruled out  Z20.822 Symptomatic; Unknown Influenza A/B & SARS-CoV2 (COVID-19) Virus  PCR Multiplex Nose     Symptomatic; Unknown Influenza A/B & SARS-CoV2 (COVID-19) Virus PCR Multiplex Nose   2. Subacute pansinusitis  J01.40 doxycycline hyclate (VIBRAMYCIN) 100 MG capsule     predniSONE (DELTASONE) 10 MG tablet     budesonide (PULMICORT) 0.5 MG/2ML neb solution     Start prednisone 30 mg daily x 5 days, then 20 mg daily x 2 days, 10 mg daily x 1 day then discontinue    Risks of oral steroid use were discussed and include psychiatric/mood changes, insomnia, stomach ulcers and potential GI bleeding, blood sugar elevation/worsening diabetes, hip/bone necrosis or bone demineralization.      Start taking zyrtec again  Start budesonide nasal rinses  Budesonide rinses:  Budesonide nasal saline irrigation per instructions:  -Obtain Cedric Med Sinus rinse over the counter.    -Use warm distilled water and 2 packets of the salt solution that comes with the bottle, dissolve in bottle up to the 240 mL justin.  -Add 1 vial of budesonide.  -Irrigate each side of your nose leaning over the sink, using 1/3 to 1/2 the volume of the bottle in each nostril every irrigation.  Irrigate 2 times daily.  -If additional rinses are needed/recommended, you may use the plan Cedric Med Sinus irrigation without the use of added budesonide.     Start flonase or nasacort 2 sprays daily    Stop over the counter decongestant spray  If no improvement in 2-3 days after starting prednisone, Doxycycline as prescribed  Follow up if symptoms are not resolving.  If further sinusitis, recommend CT sinus.     Veronica Chou NP-C  [facility]NT

## 2022-01-03 NOTE — TELEPHONE ENCOUNTER
The patient completed the zithromax. He states that on Tuesday and Wednesday his nasal drainage was clear, but on Thursday, his nasal drainage was yellow. He states that today he used 4 q-tips and he had yellow drainage out of his ear. He is wondering if he needs a new antibiotic. Please Advise.     Pharmacy: [facility]

## 2022-01-03 NOTE — NURSING NOTE
"Chief Complaint   Patient presents with     Ent Problem     Sinus Problem       Initial BP (!) 146/88 (BP Location: Right arm, Patient Position: Sitting, Cuff Size: Adult Large)   Pulse 78   Temp 97.2  F (36.2  C) (Tympanic)   Ht 1.778 m (5' 10\")   Wt 98.9 kg (218 lb)   SpO2 94%   BMI 31.28 kg/m   Estimated body mass index is 31.28 kg/m  as calculated from the following:    Height as of this encounter: 1.778 m (5' 10\").    Weight as of this encounter: 98.9 kg (218 lb).  Medication Reconciliation: complete  Patty Blue LPN    "

## 2022-01-12 ENCOUNTER — TELEPHONE (OUTPATIENT)
Dept: OTOLARYNGOLOGY | Facility: OTHER | Age: 62
End: 2022-01-12
Payer: COMMERCIAL

## 2022-01-28 ENCOUNTER — TELEPHONE (OUTPATIENT)
Dept: OTOLARYNGOLOGY | Facility: OTHER | Age: 62
End: 2022-01-28
Payer: COMMERCIAL

## 2022-01-28 NOTE — TELEPHONE ENCOUNTER
Pt called to let me know that he is out of budesonide.  He has 1 refill, I will tell him to refill the prescription.  He wanted to know how long he will have to do this.  Pt does the sinus rinse every morning, this helps him all day.  He is waking up at 3AM every night with a plugged right side sinus He is having problem with his right sinus plugging and he feels like it is affecting his neck too. Please advise.

## 2022-01-31 NOTE — TELEPHONE ENCOUNTER
Called pt and he agreed to try to Flonase.  He said that using Flonase usually plugged him up.  CT scan of sinus needs to be in open scan.

## 2022-01-31 NOTE — TELEPHONE ENCOUNTER
Recommend he uses budesonide twice daily, also start using steroid nasal spray, I think he has flonase at home.  Recommend CT sinus and follow up with Dr Comer for consult for turbinate reduction at minimum.  I will refill Budesonide.  Thanks JS

## 2022-02-05 DIAGNOSIS — J01.40 SUBACUTE PANSINUSITIS: ICD-10-CM

## 2022-02-05 DIAGNOSIS — J98.01 ACUTE BRONCHOSPASM: ICD-10-CM

## 2022-02-07 ENCOUNTER — TELEPHONE (OUTPATIENT)
Dept: OTOLARYNGOLOGY | Facility: OTHER | Age: 62
End: 2022-02-07
Payer: COMMERCIAL

## 2022-02-07 RX ORDER — ALBUTEROL SULFATE 90 UG/1
AEROSOL, METERED RESPIRATORY (INHALATION)
Qty: 18 G | Refills: 3 | Status: SHIPPED | OUTPATIENT
Start: 2022-02-07 | End: 2022-08-19

## 2022-02-08 RX ORDER — BUDESONIDE 0.5 MG/2ML
INHALANT ORAL
Qty: 60 ML | Refills: 1 | Status: SHIPPED | OUTPATIENT
Start: 2022-02-08 | End: 2022-03-14

## 2022-03-23 ENCOUNTER — TELEPHONE (OUTPATIENT)
Dept: OTOLARYNGOLOGY | Facility: OTHER | Age: 62
End: 2022-03-23
Payer: COMMERCIAL

## 2022-03-23 NOTE — TELEPHONE ENCOUNTER
Pt called and said that he is doing great except he feels something in his right nostril, he feels like it is getting stuck in his nasopharynx.  He also would like us to look at his ears.  I am going to put him on the schedule.

## 2022-04-06 ENCOUNTER — TELEPHONE (OUTPATIENT)
Dept: OTOLARYNGOLOGY | Facility: OTHER | Age: 62
End: 2022-04-06
Payer: COMMERCIAL

## 2022-04-06 NOTE — TELEPHONE ENCOUNTER
Pt called to let me know that he has a family emergency and cannot make the appt on 04/08.  I will call pt to cancel and reschedule

## 2022-04-18 ENCOUNTER — OFFICE VISIT (OUTPATIENT)
Dept: OTOLARYNGOLOGY | Facility: OTHER | Age: 62
End: 2022-04-18
Attending: NURSE PRACTITIONER
Payer: COMMERCIAL

## 2022-04-18 VITALS
OXYGEN SATURATION: 97 % | SYSTOLIC BLOOD PRESSURE: 138 MMHG | HEART RATE: 83 BPM | DIASTOLIC BLOOD PRESSURE: 82 MMHG | WEIGHT: 215 LBS | BODY MASS INDEX: 30.78 KG/M2 | TEMPERATURE: 97 F | HEIGHT: 70 IN

## 2022-04-18 DIAGNOSIS — J34.89 NASAL OBSTRUCTION: Primary | ICD-10-CM

## 2022-04-18 DIAGNOSIS — R09.81 NASAL CONGESTION: ICD-10-CM

## 2022-04-18 DIAGNOSIS — Z72.0 TOBACCO USE: ICD-10-CM

## 2022-04-18 PROCEDURE — G0463 HOSPITAL OUTPT CLINIC VISIT: HCPCS | Mod: 25

## 2022-04-18 PROCEDURE — 31231 NASAL ENDOSCOPY DX: CPT | Performed by: NURSE PRACTITIONER

## 2022-04-18 PROCEDURE — 99213 OFFICE O/P EST LOW 20 MIN: CPT | Mod: 25 | Performed by: NURSE PRACTITIONER

## 2022-04-18 RX ORDER — IPRATROPIUM BROMIDE 21 UG/1
2 SPRAY, METERED NASAL 3 TIMES DAILY PRN
Qty: 30 ML | Refills: 1 | Status: SHIPPED | OUTPATIENT
Start: 2022-04-18 | End: 2022-07-26

## 2022-04-18 RX ORDER — PREDNISONE 20 MG/1
20 TABLET ORAL DAILY
Qty: 5 TABLET | Refills: 0 | Status: SHIPPED | OUTPATIENT
Start: 2022-04-18 | End: 2022-04-23

## 2022-04-18 RX ORDER — LEVOCETIRIZINE DIHYDROCHLORIDE 5 MG/1
5 TABLET, FILM COATED ORAL EVERY EVENING
Qty: 30 TABLET | Refills: 3 | Status: SHIPPED | OUTPATIENT
Start: 2022-04-18 | End: 2023-07-12

## 2022-04-18 ASSESSMENT — PAIN SCALES - GENERAL: PAINLEVEL: NO PAIN (0)

## 2022-04-18 NOTE — NURSING NOTE
"Chief Complaint   Patient presents with     Sinus Problem     Recheck sinus.  Feels like something in the right side.       Initial /82 (BP Location: Right arm, Cuff Size: Adult Large)   Pulse 83   Temp 97  F (36.1  C) (Tympanic)   Ht 1.778 m (5' 10\")   Wt 97.5 kg (215 lb)   SpO2 97%   BMI 30.85 kg/m   Estimated body mass index is 30.85 kg/m  as calculated from the following:    Height as of this encounter: 1.778 m (5' 10\").    Weight as of this encounter: 97.5 kg (215 lb).  Medication Reconciliation: complete  Hui Fulton LPN    "

## 2022-04-18 NOTE — PATIENT INSTRUCTIONS
Start budesonide rinses twice daily  Start prednisone 20 mg daily for 5 days  Risks of oral steroid use were discussed and include psychiatric/mood changes, insomnia, stomach ulcers and potential GI bleeding, blood sugar elevation/worsening diabetes, hip/bone necrosis or bone demineralization.      Complete CT scan of the sinuses  Start Flonase 2 sprays daily  Start Xyzal 5 mg daily  Follow-up with Dr. Comer after CT is complete  Call sooner with any significant changes      Thank you for allowing Veronica GARCIA and our ENT team to participate in your care.  If your medications are too expensive, please call my nurse at the number listed below.  We can possibly change this medication.    If you have a scheduling or an appointment question please contact our Health Unit Coordinator at their direct line [phone] ext [phone]  ALL nursing questions or concerns can be directed to my Nurse Monique [phone].

## 2022-04-18 NOTE — PROGRESS NOTES
"   Otolaryngology Note         Chief Complaint:     Patient presents with:  Sinus Problem: Recheck sinus.  Feels like something in the right side.           History of Present Illness:     Villa Ruiz is a 61 year old male seen today for concerns for nasal congestion.      I last saw Villa in clinic on 1/3/2022 for concerns of nasal congestion for 1.5 months.  He had previously been treated with Z-Binh by primary care without improvement.  He does report that he has nasal drip from October to May 1 in the past.  He previously used over-the-counter nasal decongestant and was cautioned against that due to rebound congestion.    He did have nasopharyngoscopy completed by Yee in 2019, showed no nasopharyngeal mass with eustachian tubes patent.    He reports it is worse when he sits down and when he is in the warmth.  It is better when he is outside up and about in the cold.      He feels that the budesonide makes him more plugged up.  He also feels that the flonase makes him feel more plugged.  He feels that it \"gums me up\".   He has not been taking zyrtec recently as he feels it doesn't work.  He does admit that he has not been using the budesonide rinses nor the Flonase consistently.     He reports that he has had a slight decrease in hearing, questions if he has cerumen.  He denies bothersome tinnitus, flux hearing, vertigo, otalgia, otorrhea.  No acute obvious changes in hearing.    Audiogram completed 6/23/2020:    Tympanograms are Type A for right ear suggesting normal eardrum mobility and Type AS left-reduced admittance..  Acoustic Reflex Thresholds at 1000 Hz are present for both ears.  Thresholds are normal (slight condutive right at 250 Hz) sloping to moderate sensorineural hearing loss [id number] high  frequencies compoared to 2005.  Speech reception thresholds are in good agreement with pure tone average.  Word discrimination scores are excellent at supra-thresholds level.    He is a current daily " "smoker of three-quarter pack per day.         Medications:     Current Outpatient Rx   Medication Sig Dispense Refill     budesonide (PULMICORT) 0.5 MG/2ML neb solution  ALAYNA MED SINUS RINSE, ADD 0.5 MG BUDESONIDE VIAL, RINSE 1/2 BOTTLE IN EACH NOSTRIL TWICE DAILY 60 mL 3     cetirizine (ZYRTEC) 10 MG tablet Take 1 tablet (10 mg) by mouth daily 90 tablet 3     diclofenac (VOLTAREN) 1 % topical gel APPLY  TOPICALLY AS NEEDED FOR MODERATE FOR PAIN 100 g 0     fluticasone (FLONASE) 50 MCG/ACT nasal spray Spray 1 spray into both nostrils daily 16 g 1     ipratropium (ATROVENT) 0.03 % nasal spray Spray 2 sprays into both nostrils 3 times daily as needed for rhinitis 30 mL 1     levocetirizine (XYZAL) 5 MG tablet Take 1 tablet (5 mg) by mouth every evening 30 tablet 3     predniSONE (DELTASONE) 20 MG tablet Take 1 tablet (20 mg) by mouth daily for 5 days 5 tablet 0     VENTOLIN  (90 Base) MCG/ACT inhaler INHALE 2 PUFFS BY MOUTH EVERY 6 HOURS 18 g 3            Allergies:     Allergies: Augmentin          Past Medical History:     History reviewed. No pertinent past medical history.         Past Surgical History:     History reviewed. No pertinent surgical history.    ENT family history reviewed         Social History:     Social History     Tobacco Use     Smoking status: Current Every Day Smoker     Packs/day: 0.75     Years: 6.00     Pack years: 4.50     Types: Cigarettes, Cigars     Start date: 1/1/2012     Smokeless tobacco: Never Used     Tobacco comment: pt denied QP 6/23/20   Substance Use Topics     Alcohol use: No     Alcohol/week: 0.0 standard drinks     Drug use: No            Review of Systems:     ROS: See HPI         Physical Exam:     /82 (BP Location: Right arm, Cuff Size: Adult Large)   Pulse 83   Temp 97  F (36.1  C) (Tympanic)   Ht 1.778 m (5' 10\")   Wt 97.5 kg (215 lb)   SpO2 97%   BMI 30.85 kg/m      General - The patient is well nourished and well developed, and appears to " have good nutritional status.  Alert and oriented to person and place, answers questions and cooperates with examination appropriately.  No acute distress, he appears nontoxic  Head and Face - Normocephalic and atraumatic, with no gross asymmetry noted.  The facial nerve is intact, with strong symmetric movements.  Voice and Breathing - The patient was breathing comfortably without the use of accessory muscles. There was no wheezing, stridor. The patients voice was clear and strong, and had appropriate pitch and quality.  Ears - External ear normal. Canals without obstructive cerumen.  Right tympanic membrane is intact without effusion, retraction or mass. Left tympanic membrane is intact without effusion, retraction or mass.  Eyes - Extraocular movements intact, sclera were not icteric or injected.  Mouth - Examination of the oral cavity showed pink, healthy oral mucosa.  Upper dentition is edentulous, few front lower teeth are present.  He is not currently wearing dentures.  No lesions or ulcerations noted. The tongue was mobile and midline.   Throat - The walls of the oropharynx were smooth, pink, moist, symmetric, and had no lesions or ulcerations.  The tonsillar pillars and soft palate were symmetric. The uvula was midline on elevation.    Neck - no palpable lymphadenopathy.  Palpation of the thyroid was soft and smooth, with no nodules or goiter appreciated.  The trachea was mobile and midline.  Nose - External contour is symmetric, no gross deflection or scars.  Nasal mucosa is pink and moist with no abnormal mucus.  The septum and turbinates were evaluated with nasal speculum, no polyps, masses, or purulence noted on examination of anterior nasal cavity.    To evaluate the nose and sinuses, I performed rigid nasal endoscopy.  I sprayed both nares with 2 sprays lidocaine and neosynephrine.     I began with the RIGHT side using a 0 degree rigid nasal endoscope, and then similarly examined the LEFT side      Findings: Slight deviated nasal septum right.  The septum is grossly edematous with polypoid change  Inferior turbinates:   Right inferior turbinate is edematous with polypoid change.  The left inferior turbinate is edematous.  Middle turbinate and middle meatus: Unable to view right middle turbinate, left middle turbinate is grossly enlarged, edematous with severe polypoid change.  There was clear drainage on the face of the left middle turbinate that was suctioned with #8 Beard.  Pictures were obtained of the left middle meatus, see below  Limited view of the superior meatus due to polypoid change and edema.    Unable to view sphenoethmoidal recess or nasopharynx.  He tolerated the procedure well    Left septum:          Left Middle Turbinate             Assessment and Plan:       ICD-10-CM    1. Nasal obstruction  J34.89 CT Sinus w/o Contrast     Antineutrophil Cytoplasmic Ab ([facility])     Inhalent Panel [place] ([facility])     predniSONE (DELTASONE) 20 MG tablet     levocetirizine (XYZAL) 5 MG tablet     ipratropium (ATROVENT) 0.03 % nasal spray   2. Nasal congestion  R09.81 Inhalent Panel [place] ([facility])     predniSONE (DELTASONE) 20 MG tablet     levocetirizine (XYZAL) 5 MG tablet     ipratropium (ATROVENT) 0.03 % nasal spray   3. Tobacco use  Z72.0      Discussed patient's case and reviewed pictures with Dr. Comer.  Antineutrophil Cytoplasmic antibody lab was ordered to rule out GPA per Dr. Comer's recommendation.  Complete environmental RAST testing  Stop Zyrtec, start Xyzal as prescribed.  Continue budesonide rinses, advised to start using them consistently twice daily  Continue use of Flonase consistently  Start ipratropium nasal spray, use as needed for congestion  We will do a low-dose prednisone burst, 20 mg daily for 5 days.    Risks of oral steroid use were discussed and include psychiatric/mood changes, insomnia, stomach ulcers and potential GI bleeding, blood sugar  elevation/worsening diabetes, hip/bone necrosis or bone demineralization.      Quit tobacco  Tobacco cessation was strongly encouraged.  The associated risk of head and neck cancer was discussed.  Every year of cessation offers health benefits. This was discussed with the patient today and they voiced understanding.  Quit tools and a nicotine patch were offered.      Follow-up with Dr. Comer after CT and lab testing is completed    Veronica GARCIA  [facility]

## 2022-04-18 NOTE — LETTER
"    4/18/2022         RE: Villa Ruiz  [address]  [address]  [place] [address]        Dear Colleague,    Thank you for referring your patient, Villa Ruiz, to the [facility] [place]. Please see a copy of my visit note below.       Otolaryngology Note         Chief Complaint:     Patient presents with:  Sinus Problem: Recheck sinus.  Feels like something in the right side.           History of Present Illness:     Villa Ruiz is a 61 year old male seen today for concerns for nasal congestion.      I last saw Villa in clinic on 1/3/2022 for concerns of nasal congestion for 1.5 months.  He had previously been treated with Z-Binh by primary care without improvement.  He does report that he has nasal drip from October to May 1 in the past.  He previously used over-the-counter nasal decongestant and was cautioned against that due to rebound congestion.    He did have nasopharyngoscopy completed by Yee in 2019, showed no nasopharyngeal mass with eustachian tubes patent.    He reports it is worse when he sits down and when he is in the warmth.  It is better when he is outside up and about in the cold.      He feels that the budesonide makes him more plugged up.  He also feels that the flonase makes him feel more plugged.  He feels that it \"gums me up\".   He has not been taking zyrtec recently as he feels it doesn't work.  He does admit that he has not been using the budesonide rinses nor the Flonase consistently.     He reports that he has had a slight decrease in hearing, questions if he has cerumen.  He denies bothersome tinnitus, flux hearing, vertigo, otalgia, otorrhea.  No acute obvious changes in hearing.    Audiogram completed 6/23/2020:    Tympanograms are Type A for right ear suggesting normal eardrum mobility and Type AS left-reduced admittance..  Acoustic Reflex Thresholds at 1000 Hz are present for both ears.  Thresholds are normal (slight condutive right at 250 Hz) sloping to moderate " sensorineural hearing loss [id number] high  frequencies compoared to 2005.  Speech reception thresholds are in good agreement with pure tone average.  Word discrimination scores are excellent at supra-thresholds level.    He is a current daily smoker of three-quarter pack per day.         Medications:     Current Outpatient Rx   Medication Sig Dispense Refill     budesonide (PULMICORT) 0.5 MG/2ML neb solution  ALAYNA MED SINUS RINSE, ADD 0.5 MG BUDESONIDE VIAL, RINSE 1/2 BOTTLE IN EACH NOSTRIL TWICE DAILY 60 mL 3     cetirizine (ZYRTEC) 10 MG tablet Take 1 tablet (10 mg) by mouth daily 90 tablet 3     diclofenac (VOLTAREN) 1 % topical gel APPLY  TOPICALLY AS NEEDED FOR MODERATE FOR PAIN 100 g 0     fluticasone (FLONASE) 50 MCG/ACT nasal spray Spray 1 spray into both nostrils daily 16 g 1     ipratropium (ATROVENT) 0.03 % nasal spray Spray 2 sprays into both nostrils 3 times daily as needed for rhinitis 30 mL 1     levocetirizine (XYZAL) 5 MG tablet Take 1 tablet (5 mg) by mouth every evening 30 tablet 3     predniSONE (DELTASONE) 20 MG tablet Take 1 tablet (20 mg) by mouth daily for 5 days 5 tablet 0     VENTOLIN  (90 Base) MCG/ACT inhaler INHALE 2 PUFFS BY MOUTH EVERY 6 HOURS 18 g 3            Allergies:     Allergies: Augmentin          Past Medical History:     History reviewed. No pertinent past medical history.         Past Surgical History:     History reviewed. No pertinent surgical history.    ENT family history reviewed         Social History:     Social History     Tobacco Use     Smoking status: Current Every Day Smoker     Packs/day: 0.75     Years: 6.00     Pack years: 4.50     Types: Cigarettes, Cigars     Start date: 1/1/2012     Smokeless tobacco: Never Used     Tobacco comment: pt denied QP 6/23/20   Substance Use Topics     Alcohol use: No     Alcohol/week: 0.0 standard drinks     Drug use: No            Review of Systems:     ROS: See HPI         Physical Exam:     /82 (BP  "Location: Right arm, Cuff Size: Adult Large)   Pulse 83   Temp 97  F (36.1  C) (Tympanic)   Ht 1.778 m (5' 10\")   Wt 97.5 kg (215 lb)   SpO2 97%   BMI 30.85 kg/m      General - The patient is well nourished and well developed, and appears to have good nutritional status.  Alert and oriented to person and place, answers questions and cooperates with examination appropriately.  No acute distress, he appears nontoxic  Head and Face - Normocephalic and atraumatic, with no gross asymmetry noted.  The facial nerve is intact, with strong symmetric movements.  Voice and Breathing - The patient was breathing comfortably without the use of accessory muscles. There was no wheezing, stridor. The patients voice was clear and strong, and had appropriate pitch and quality.  Ears - External ear normal. Canals without obstructive cerumen.  Right tympanic membrane is intact without effusion, retraction or mass. Left tympanic membrane is intact without effusion, retraction or mass.  Eyes - Extraocular movements intact, sclera were not icteric or injected.  Mouth - Examination of the oral cavity showed pink, healthy oral mucosa.  Upper dentition is edentulous, few front lower teeth are present.  He is not currently wearing dentures.  No lesions or ulcerations noted. The tongue was mobile and midline.   Throat - The walls of the oropharynx were smooth, pink, moist, symmetric, and had no lesions or ulcerations.  The tonsillar pillars and soft palate were symmetric. The uvula was midline on elevation.    Neck - no palpable lymphadenopathy.  Palpation of the thyroid was soft and smooth, with no nodules or goiter appreciated.  The trachea was mobile and midline.  Nose - External contour is symmetric, no gross deflection or scars.  Nasal mucosa is pink and moist with no abnormal mucus.  The septum and turbinates were evaluated with nasal speculum, no polyps, masses, or purulence noted on examination of anterior nasal cavity.    To " evaluate the nose and sinuses, I performed rigid nasal endoscopy.  I sprayed both nares with 2 sprays lidocaine and neosynephrine.     I began with the RIGHT side using a 0 degree rigid nasal endoscope, and then similarly examined the LEFT side     Findings: Slight deviated nasal septum right.  The septum is grossly edematous with polypoid change  Inferior turbinates:   Right inferior turbinate is edematous with polypoid change.  The left inferior turbinate is edematous.  Middle turbinate and middle meatus: Unable to view right middle turbinate, left middle turbinate is grossly enlarged, edematous with severe polypoid change.  There was clear drainage on the face of the left middle turbinate that was suctioned with #8 Beard.  Pictures were obtained of the left middle meatus, see below  Limited view of the superior meatus due to polypoid change and edema.    Unable to view sphenoethmoidal recess or nasopharynx.  He tolerated the procedure well    Left septum:          Left Middle Turbinate             Assessment and Plan:       ICD-10-CM    1. Nasal obstruction  J34.89 CT Sinus w/o Contrast     Antineutrophil Cytoplasmic Ab ([facility])     Inhalent Panel [place] ([facility])     predniSONE (DELTASONE) 20 MG tablet     levocetirizine (XYZAL) 5 MG tablet     ipratropium (ATROVENT) 0.03 % nasal spray   2. Nasal congestion  R09.81 Inhalent Panel [place] ([facility])     predniSONE (DELTASONE) 20 MG tablet     levocetirizine (XYZAL) 5 MG tablet     ipratropium (ATROVENT) 0.03 % nasal spray   3. Tobacco use  Z72.0      Discussed patient's case and reviewed pictures with Dr. Comer.  Antineutrophil Cytoplasmic antibody lab was ordered to rule out GPA per Dr. Comer's recommendation.  Complete environmental RAST testing  Stop Zyrtec, start Xyzal as prescribed.  Continue budesonide rinses, advised to start using them consistently twice daily  Continue use of Flonase consistently  Start ipratropium nasal spray, use as  needed for congestion  We will do a low-dose prednisone burst, 20 mg daily for 5 days.    Risks of oral steroid use were discussed and include psychiatric/mood changes, insomnia, stomach ulcers and potential GI bleeding, blood sugar elevation/worsening diabetes, hip/bone necrosis or bone demineralization.      Quit tobacco  Tobacco cessation was strongly encouraged.  The associated risk of head and neck cancer was discussed.  Every year of cessation offers health benefits. This was discussed with the patient today and they voiced understanding.  Quit tools and a nicotine patch were offered.      Follow-up with Dr. Comer after CT and lab testing is completed    Veronica GARCIA  [facility]        Again, thank you for allowing me to participate in the care of your patient.        Sincerely,        Veronica Chou NP     Estimated Blood Loss (Cc): minimal

## 2022-04-19 ENCOUNTER — TELEPHONE (OUTPATIENT)
Dept: OTOLARYNGOLOGY | Facility: OTHER | Age: 62
End: 2022-04-19

## 2022-04-19 ENCOUNTER — LAB (OUTPATIENT)
Dept: LAB | Facility: OTHER | Age: 62
End: 2022-04-19
Payer: COMMERCIAL

## 2022-04-19 DIAGNOSIS — R09.81 NASAL CONGESTION: ICD-10-CM

## 2022-04-19 DIAGNOSIS — J34.89 NASAL OBSTRUCTION: ICD-10-CM

## 2022-04-19 PROCEDURE — 86003 ALLG SPEC IGE CRUDE XTRC EA: CPT | Mod: ZL

## 2022-04-19 PROCEDURE — 36415 COLL VENOUS BLD VENIPUNCTURE: CPT | Mod: ZL

## 2022-04-19 PROCEDURE — 83876 ASSAY MYELOPEROXIDASE: CPT | Mod: ZL

## 2022-04-19 NOTE — TELEPHONE ENCOUNTER
Received a PA from [facility] for Levocetirizine Dihydrochloride 5MG tablets. Submitted on CMM. Waiting for a response.

## 2022-04-20 ENCOUNTER — HOSPITAL ENCOUNTER (OUTPATIENT)
Dept: CT IMAGING | Facility: HOSPITAL | Age: 62
Discharge: HOME | End: 2022-04-20
Attending: NURSE PRACTITIONER | Admitting: NURSE PRACTITIONER
Payer: COMMERCIAL

## 2022-04-20 DIAGNOSIS — J34.89 NASAL OBSTRUCTION: ICD-10-CM

## 2022-04-20 PROCEDURE — 70486 CT MAXILLOFACIAL W/O DYE: CPT

## 2022-04-22 LAB
MYELOPEROXIDASE AB SER IA-ACNC: <0.3 U/ML
MYELOPEROXIDASE AB SER IA-ACNC: NEGATIVE
PROTEINASE3 AB SER IA-ACNC: 1.2 U/ML
PROTEINASE3 AB SER IA-ACNC: NEGATIVE

## 2022-04-22 NOTE — RESULT ENCOUNTER NOTE
Deviated nasal septum to the right.  He also have some mild mucosal thickening seen in both of the maxillary sinuses.  Please schedule follow-up with Dr. Comer for recheck, thanks JS

## 2022-04-22 NOTE — RESULT ENCOUNTER NOTE
Negative antineutrophilic cytoplasmic antibody panel.  This is a screening panel for Granulomatosis polyangiitis (a rare autoimmune disease that can cause significant inflammation in the sinuses) 90% of the people who have GPA is positive for this antibody.  A negative screen does not completely rule it out, however it is unlikely.  Please call patient, he should have a follow-up scheduled with Dr. Comer for rescope of the nose.  Next, BARBARA

## 2022-04-25 NOTE — TELEPHONE ENCOUNTER
I called [facility] for the status of the PA and I was told that they cancelled it. They spoke with the pharmacy and were able to do an override and the medication was then filled.

## 2022-05-02 LAB — SCANNED LAB RESULT: NORMAL

## 2022-05-11 NOTE — PROGRESS NOTES
Otolaryngology Progress Note          Villa Ruiz is a 61 year old male  Presents for follow-up of chronic congestion.   He feels a flap in the right side of his nose that does not allow air or irrigations through.  She does have bilateral chronic congestion.    Denies chronic facial pressure    SNOT 35 with a problem as bad as it can be with nasal blockage and need to blow nose     Irregular mucosa was noted on prior exam by Veronica on 418.    He was started on budesonide irrigations prednisone, Xyzal and was to continue Flonase.  Tobacco cessation was discussed.      He notes improvement with using the budesonide irrigations.  His mucus seems less thick.  He is trying to drink more water    He continues to smoke    Distant history of a nasal fracture playing hockey without prior nasal or sinus surgery      CT sinuses dated 4/20/2022 shows clear sinuses throughout aside from mild polypoid mucoperiosteal thickening and a small air-fluid level in the maxillary sinuses ostiomeatal complexes are obstructed bilaterally there is a significant septal deviation to the right.  The quadrangular cartilage is displaced off the maxillary crest bilateral turbinate hypertrophy and left carlene        Physical Exam  /84 (BP Location: Right arm, Patient Position: Sitting, Cuff Size: Adult Regular)   Pulse 67   Temp 97.2  F (36.2  C) (Tympanic)   Wt 95.7 kg (211 lb)   SpO2 94%   BMI 30.28 kg/m    General - The patient is well nourished and well developed, and appears to have good nutritional status.  Alert and oriented to person and place, interactive.  Head and Face - Normocephalic and atraumatic, with no gross asymmetry noted of the contour of the facial features.  The facial nerve is intact, with strong symmetric movements.  Eyes - Extraocular movements intact.   Ears- External auditory canals are patent, tympanic membranes are intact without effusion or worrisome retractions   Nose - thick nasal dorsum skin, slight  irregularity to dorsum and tip.  Nasal mucosa is pink and moist with no abnormal mucus.  Septal deviation to the right with nearly 90% obstruction at the mid to superior septum.  Bilateral inferior turbinate hypertrophy   mouth - Examination of the oral cavity shows pink, healthy, moist mucosa.  No lesions or ulceration noted.  Upper edentulous no mass.   the tongue is mobile and midline.  Throat - The walls of the oropharynx were smooth, pink, moist, symmetric, and had no lesions or ulcerations.  The tonsillar pillars and soft palate were symmetric.  The uvula was midline on elevation.        To evaluate the nose and sinuses, I performed rigid nasal endoscopy.  I applied topical nasal lidocaine and neosynephrine.    I began with the LEFT side using a 0 degree rigid nasal endoscope, and then similarly examined the RIGHT side    Findings:  Inferior turbinates:  Enlarged  I cannot pass the scope into the right middle meatus or evaluate the right lateral nasal wall due to septal deviation with 90 % or greater obstruction  Right inv and env collapse, improved with modified coddle  Left:  Middle turbinate and middle meatus:  No purulence, no polyposis  Superior meatus was evaluated  Mucosa is edematous throughout,  No tim polyps nor polypoid degeneration  Sphenoethmoidal recess without purulence   Nasopharynx no mass  The patient tolerated the procedure well      Impression/Plan  Villa Ruiz is a 61 year old male    ICD-10-CM    1. Preoperative examination  Z01.818 diazepam (VALIUM) 10 MG tablet   2. DNS (deviated nasal septum)  J34.2    3. Nasal turbinate hypertrophy  J34.3    4. Nasal valve collapse  J34.89    5. Tobacco abuse counseling  Z71.6            Risks and complications of septoplasty, bilateral turbinate reduction were discussed include general anesthesia, bleeding, infection, septal perforation, anosmia, epiphora, CSF rhinorrhea, atrophic rhinitis, scar formation, numbness over the incision, need for  additional surgery and no guarantee is made that the septum will be completely straight.  Understanding is expressed, all questions are answered and wishes are to proceed with surgical intervention.    Tract septo  Need sinus tray     I told him that additional surgery may be necessary but he would like to start with an office procedure to try to avoid general anesthesia and I feel this is certainly appropriate.  I may not be able to fully address the mid to superior septal deviation as well in the office this was also discussed        Sinus anatomy and sinus disease were reviewed.  CT sinus was reviewed with the patient.  All questions were answered.     Max only  grover  Hold on balloons    Continue all current medications recommended by me or my staff.    Continue budesonide irrigations.  Verbal and written education on use provided.    The importance of budesonide irrigations postoperatively was reinforced.    The correct use of nasal irrigations as prescribed will prevent synechiae and allow for proper recovery of the sinus mucosa.       Tobacco cessation was strongly encouraged.  The associated risk of head and neck cancer was discussed.  Every year of cessation offers health benefits. This was discussed with the patient today and they voiced understanding.  Quit tools and a nicotine patch were offered.              Debby Comer D.O.  Otolaryngology/Head and Neck Surgery  Allergy

## 2022-05-12 ENCOUNTER — OFFICE VISIT (OUTPATIENT)
Dept: OTOLARYNGOLOGY | Facility: OTHER | Age: 62
End: 2022-05-12
Attending: OTOLARYNGOLOGY
Payer: COMMERCIAL

## 2022-05-12 VITALS
OXYGEN SATURATION: 94 % | WEIGHT: 211 LBS | BODY MASS INDEX: 30.28 KG/M2 | TEMPERATURE: 97.2 F | DIASTOLIC BLOOD PRESSURE: 84 MMHG | SYSTOLIC BLOOD PRESSURE: 138 MMHG | HEART RATE: 67 BPM

## 2022-05-12 DIAGNOSIS — Z71.6 TOBACCO ABUSE COUNSELING: ICD-10-CM

## 2022-05-12 DIAGNOSIS — Z01.818 PREOPERATIVE EXAMINATION: Primary | ICD-10-CM

## 2022-05-12 DIAGNOSIS — J34.3 NASAL TURBINATE HYPERTROPHY: ICD-10-CM

## 2022-05-12 DIAGNOSIS — J34.2 DNS (DEVIATED NASAL SEPTUM): ICD-10-CM

## 2022-05-12 DIAGNOSIS — J34.829 NASAL VALVE COLLAPSE: ICD-10-CM

## 2022-05-12 PROCEDURE — 31231 NASAL ENDOSCOPY DX: CPT | Performed by: OTOLARYNGOLOGY

## 2022-05-12 PROCEDURE — 99214 OFFICE O/P EST MOD 30 MIN: CPT | Mod: 25 | Performed by: OTOLARYNGOLOGY

## 2022-05-12 PROCEDURE — G0463 HOSPITAL OUTPT CLINIC VISIT: HCPCS

## 2022-05-12 RX ORDER — DIAZEPAM 10 MG
TABLET ORAL
Qty: 2 TABLET | Refills: 0 | Status: SHIPPED | OUTPATIENT
Start: 2022-05-12 | End: 2023-07-12

## 2022-05-12 ASSESSMENT — PAIN SCALES - GENERAL: PAINLEVEL: NO PAIN (0)

## 2022-05-12 NOTE — LETTER
5/12/2022         RE: Villa Ruiz  [address]  [address]  [facility] [address]        Dear Colleague,    Thank you for referring your patient, Villa Ruiz, to the [facility]. Please see a copy of my visit note below.    Otolaryngology Progress Note          Villa Ruiz is a 61 year old male  Presents for follow-up of chronic congestion.   He feels a flap in the right side of his nose that does not allow air or irrigations through.  She does have bilateral chronic congestion.    Denies chronic facial pressure    SNOT 35 with a problem as bad as it can be with nasal blockage and need to blow nose     Irregular mucosa was noted on prior exam by Veronica on 418.    He was started on budesonide irrigations prednisone, Xyzal and was to continue Flonase.  Tobacco cessation was discussed.      He notes improvement with using the budesonide irrigations.  His mucus seems less thick.  He is trying to drink more water    He continues to smoke    Distant history of a nasal fracture playing hockey without prior nasal or sinus surgery      CT sinuses dated 4/20/2022 shows clear sinuses throughout aside from mild polypoid mucoperiosteal thickening and a small air-fluid level in the maxillary sinuses ostiomeatal complexes are obstructed bilaterally there is a significant septal deviation to the right.  The quadrangular cartilage is displaced off the maxillary crest bilateral turbinate hypertrophy and left carlene        Physical Exam  /84 (BP Location: Right arm, Patient Position: Sitting, Cuff Size: Adult Regular)   Pulse 67   Temp 97.2  F (36.2  C) (Tympanic)   Wt 95.7 kg (211 lb)   SpO2 94%   BMI 30.28 kg/m    General - The patient is well nourished and well developed, and appears to have good nutritional status.  Alert and oriented to person and place, interactive.  Head and Face - Normocephalic and atraumatic, with no gross asymmetry noted of the contour of the facial features.  The  facial nerve is intact, with strong symmetric movements.  Eyes - Extraocular movements intact.   Ears- External auditory canals are patent, tympanic membranes are intact without effusion or worrisome retractions   Nose - thick nasal dorsum skin, slight irregularity to dorsum and tip.  Nasal mucosa is pink and moist with no abnormal mucus.  Septal deviation to the right with nearly 90% obstruction at the mid to superior septum.  Bilateral inferior turbinate hypertrophy   mouth - Examination of the oral cavity shows pink, healthy, moist mucosa.  No lesions or ulceration noted.  Upper edentulous no mass.   the tongue is mobile and midline.  Throat - The walls of the oropharynx were smooth, pink, moist, symmetric, and had no lesions or ulcerations.  The tonsillar pillars and soft palate were symmetric.  The uvula was midline on elevation.        To evaluate the nose and sinuses, I performed rigid nasal endoscopy.  I applied topical nasal lidocaine and neosynephrine.    I began with the LEFT side using a 0 degree rigid nasal endoscope, and then similarly examined the RIGHT side    Findings:  Inferior turbinates:  Enlarged  I cannot pass the scope into the right middle meatus or evaluate the right lateral nasal wall due to septal deviation with 90 % or greater obstruction  Right inv and env collapse, improved with modified coddle  Left:  Middle turbinate and middle meatus:  No purulence, no polyposis  Superior meatus was evaluated  Mucosa is edematous throughout,  No tim polyps nor polypoid degeneration  Sphenoethmoidal recess without purulence   Nasopharynx no mass  The patient tolerated the procedure well      Impression/Plan  Villa Ruiz is a 61 year old male    ICD-10-CM    1. Preoperative examination  Z01.818 diazepam (VALIUM) 10 MG tablet   2. DNS (deviated nasal septum)  J34.2    3. Nasal turbinate hypertrophy  J34.3    4. Nasal valve collapse  J34.89    5. Tobacco abuse counseling  Z71.6            Risks and  complications of septoplasty, bilateral turbinate reduction were discussed include general anesthesia, bleeding, infection, septal perforation, anosmia, epiphora, CSF rhinorrhea, atrophic rhinitis, scar formation, numbness over the incision, need for additional surgery and no guarantee is made that the septum will be completely straight.  Understanding is expressed, all questions are answered and wishes are to proceed with surgical intervention.    Tract septo  Need sinus tray     I told him that additional surgery may be necessary but he would like to start with an office procedure to try to avoid general anesthesia and I feel this is certainly appropriate.  I may not be able to fully address the mid to superior septal deviation as well in the office this was also discussed        Sinus anatomy and sinus disease were reviewed.  CT sinus was reviewed with the patient.  All questions were answered.     Max only  grover  Hold on balloons    Continue all current medications recommended by me or my staff.    Continue budesonide irrigations.  Verbal and written education on use provided.    The importance of budesonide irrigations postoperatively was reinforced.    The correct use of nasal irrigations as prescribed will prevent synechiae and allow for proper recovery of the sinus mucosa.       Tobacco cessation was strongly encouraged.  The associated risk of head and neck cancer was discussed.  Every year of cessation offers health benefits. This was discussed with the patient today and they voiced understanding.  Quit tools and a nicotine patch were offered.              Debby Comer D.O.  Otolaryngology/Head and Neck Surgery  Allergy            Again, thank you for allowing me to participate in the care of your patient.        Sincerely,        Debby Comer MD

## 2022-05-12 NOTE — NURSING NOTE
"Chief Complaint   Patient presents with     Follow Up     Nasal Obstruction, Nasal Congestion       Initial /84 (BP Location: Right arm, Patient Position: Sitting, Cuff Size: Adult Regular)   Pulse 67   Temp 97.2  F (36.2  C) (Tympanic)   Wt 95.7 kg (211 lb)   SpO2 94%   BMI 30.28 kg/m   Estimated body mass index is 30.28 kg/m  as calculated from the following:    Height as of 4/18/22: 1.778 m (5' 10\").    Weight as of this encounter: 95.7 kg (211 lb).  Medication Reconciliation: complete  VICKI GARZA LPN    "

## 2022-05-12 NOTE — PATIENT INSTRUCTIONS
Thank you for allowing Dr. Comer and our ENT team to participate in your care.  If your medications are too expensive, please give the nurse a call.  We can possibly change this medication.  If you have a scheduling or an appointment question please contact our Health Unit Coordinator at their direct line [phone].   ALL nursing questions or concerns can be directed to your ENT nurse at: [phone] - Patricia    Follow up with Dr. Comer for an in office procedure    Complete Covid Test prior to procedure    Take the pre procedure medications after signing consent; make sure you have a ride home    Continue budesonide rinses twice daily    Work on quitting smoking        Budesonide nasal saline irrigation per instructions:  -Obtain Cedric Med Sinus rinse over the counter.    -Use warm distilled water and 2 packets of the salt solution that comes with the bottle, dissolve in bottle up to the 240 mL justin.  -Add 1 vial of budesonide.  -Irrigate each side of your nose leaning over the sink, using 1/3 to 1/2 the volume of the bottle in each nostril every irrigation.  Irrigate 2 times daily.  -If additional rinses are needed/recommended, you may use the plan Cedric Med Sinus irrigation without the use of added budesonide.

## 2022-06-06 DIAGNOSIS — M25.50 PAIN IN JOINT, MULTIPLE SITES: ICD-10-CM

## 2022-06-14 ENCOUNTER — TELEPHONE (OUTPATIENT)
Dept: OTOLARYNGOLOGY | Facility: OTHER | Age: 62
End: 2022-06-14
Payer: COMMERCIAL

## 2022-06-14 DIAGNOSIS — Z01.818 PREOPERATIVE EXAMINATION: Primary | ICD-10-CM

## 2022-06-14 DIAGNOSIS — J34.2 DNS (DEVIATED NASAL SEPTUM): ICD-10-CM

## 2022-06-14 DIAGNOSIS — J34.3 NASAL TURBINATE HYPERTROPHY: ICD-10-CM

## 2022-06-14 RX ORDER — HYDROCODONE BITARTRATE AND ACETAMINOPHEN 5; 325 MG/1; MG/1
TABLET ORAL
Qty: 18 TABLET | Refills: 0 | Status: SHIPPED | OUTPATIENT
Start: 2022-06-14 | End: 2023-07-12

## 2022-06-14 NOTE — TELEPHONE ENCOUNTER
The patient has an upcoming in office procedure with Dr. Comer. He had the valium called in and picked up, but not the pain medication. He is requesting this be sent to [facility]. Please Advise.

## 2022-06-17 DIAGNOSIS — J01.40 SUBACUTE PANSINUSITIS: ICD-10-CM

## 2022-06-17 RX ORDER — BUDESONIDE 0.5 MG/2ML
INHALANT ORAL
Qty: 60 ML | Refills: 0 | Status: SHIPPED | OUTPATIENT
Start: 2022-06-17 | End: 2022-07-05

## 2022-06-17 NOTE — TELEPHONE ENCOUNTER
Budesonide  Last Written Prescription Date: 3/14/22  Last Fill Quantity: 60 ml # of Refills: 3  Last Office Visit: 5/12/22

## 2022-06-23 ENCOUNTER — ALLIED HEALTH/NURSE VISIT (OUTPATIENT)
Dept: FAMILY MEDICINE | Facility: OTHER | Age: 62
End: 2022-06-23
Attending: OTOLARYNGOLOGY
Payer: COMMERCIAL

## 2022-06-23 DIAGNOSIS — Z11.52 ENCOUNTER FOR SCREENING FOR COVID-19: Primary | ICD-10-CM

## 2022-06-23 PROCEDURE — U0003 INFECTIOUS AGENT DETECTION BY NUCLEIC ACID (DNA OR RNA); SEVERE ACUTE RESPIRATORY SYNDROME CORONAVIRUS 2 (SARS-COV-2) (CORONAVIRUS DISEASE [COVID-19]), AMPLIFIED PROBE TECHNIQUE, MAKING USE OF HIGH THROUGHPUT TECHNOLOGIES AS DESCRIBED BY CMS-2020-01-R: HCPCS | Mod: ZL

## 2022-06-23 NOTE — PROGRESS NOTES
Otolaryngology Progress Note          Villa Ruiz is a 62 year old male  Presents for follow-up of chronic congestion.    I saw him on 512 and noted a septal deviation to the right with nearly 90% obstruction at the mid to superior septum,  bilateral turbinate hypertrophy and a left carlene    Surgical versus an office procedure was discussed and he elected to proceed with in office surgery    He was prescribed budesonide irrigations.  He has been using irrigations with some improvement on the left with improved air flow but continues to have complete right nasal obstruction        Physical Exam  BP (!) 150/90 (BP Location: Left arm, Patient Position: Sitting, Cuff Size: Adult Regular)   Pulse 66   Temp 97.1  F (36.2  C) (Tympanic)   Resp 18   SpO2 97%   General - The patient is well nourished and well developed, and appears to have good nutritional status.  Alert and oriented to person and place, interactive.  Head and Face - Normocephalic and atraumatic, with no gross asymmetry noted of the contour of the facial features.  The facial nerve is intact, with strong symmetric movements.  Eyes - Extraocular movements intact.   Nose - Nasal mucosa is pink and moist with no abnormal mucus.      PROCEDURE  Otolaryngology Operative Note     Pre-op Diagnosis: Deviated nasal septum, bilateral inferior turbinate hypertrophy, left carlene bullosa  Post-op Diagnosis:  Same    Procedures:    1.  Septoplasty  2.  Bilateral submucous reduction inferior turbinates  3.  Endoscopic septoplasty    Surgeon:  Debby Comer D.O.  Anesthesia:  General endotracheal  EBL:  5 ml  Findings: Preop septal deviation to the right with 90% obstruction along the inferior through mid and superior septal deviation, left carlene  Complications:  none  Condition:  stable     Risks and complications of septoplasty, bilateral turbinate reduction were discussed include general anesthesia, bleeding, infection, septal perforation, anosmia,  epiphora, CSF rhinorrhea, atrophic rhinitis, scar formation, numbness over the incision, need for additional surgery and no guarantee is made that the septum will be completely straight.  Understanding is expressed, all questions are answered and wishes are to proceed with surgical intervention.     Description of the Procedure  After surgical consent was obtained the patient was laid in a comfortable seated position. The patient was draped in the normal clean fashion and a timeout was taken.  The bed was placed into reverse Trendelenburg positioning.  A timeout was taken.  Cocaine pledgets were placed into the nares for several minutes and removed.  The septum, middle turbinates, inferior turbinates were anesthetized with 1% lidocaine with 1-100,000 epinephrine.  Another timeout was taken    Attention was first turned to the left side.  Using a 0 degree endoscope, the coblation plasma wand was advanced to the distal demarcation point to the posterior portion of the inferior turbinate.  Coblation at a setting of 6  was performed for 10 seconds, and the blade withdrawn to the distal demarcation with another 10 second coblation.  Upon withdrawing the wand, coagulation at a setting of 2 was used to achieve hemostasis.  Outfracture of the inferior turbinates was performed with a septal displacer.        Next I turned my attention to the left carlene bullosa.  I used a 0 endoscope and sickle blade and incised the left carlene I used conchal scissors to remove the lateral portion of the carlene.  Silver nitrate was used to achieve hemostasis.  There is improved access to the lateral nasal wall.    Next I placed the tract septoplasty balloon into the right nares and slowly inflated the balloon to 8 mm atmospheric pressure.  The balloon was held inflated for 2 minutes deflated and a second inflation of 2 minutes was performed just anterior to the first.  The balloon was removed I then placed the balloon into the left nares and  similarly performed 1 inflation up to 8 mm atmospheric pressure which was held for 2 minutes.  The balloon was removed.  Under endoscopy I anesthetized the right septum.  I used a 15 blade and made a hemitransfixion incision on the right side.  I used a Onesimo to elevate a mucoperichondrial flap.  I used a D knife Jer-Cut and Farhad forceps to remove the obstructive portion of the quadrangular cartilage and perpendicular plate of the ethmoid bone which was deviated into the right nares.  I used a D knife to remove a spur along the floor on the right.  I was unable to reapproximate the flaps with a [place] the septum is midline and intact postoperative photos were taken.  He voiced improved breathing through his nose.  I placed one 4-0 Vicryl suture in a horizontal mattress fashion to reapproximate the mucosal flaps.  A [place] was used to further medialized the septum and smooth the mucosal edges.    I then turned my attention to the right turbinate reduction and outfracture which was performed in a similar fashion with similar results.  Hemostasis was achieved with scant use of silver nitrate and is adequate.  There is no bleeding.  No packing was used.    The patient tolerated the procedure well.        Impression/Plan  Villa Ruiz is a 62 year old male    ICD-10-CM    1. Nasal turbinate hypertrophy  J34.3 cocaine nasal solution SOLN     CAUTERY/ABLATION TURBINATES, INTRAMURAL     HC OPEN EXCISION CHELO BULLOSA MIDDLE TURBINATE OR MID TURBINATE RESEC     CT BALLOON SEPTOPLASTY   2. 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  Symptoms such as fatigue, nausea, light-headedness, and sometimes a low grade fever (up to 100 degrees) are not unusual.  As your body removes the anesthetic drugs from circulation, these symptoms will resolve.  Your nose will be sore after surgery, and you may even have symptoms similar to a sinus infection with headache, congestion, and pressure.  These will resolve with healing.  For several days you may experience bloody drainage from the nose, please use the drip pad as necessary for this.  If there is persistent bleeding, please call the office during business hours or the on call ENT physician after hours.  There are no diet restrictions after sinus surgery, and you can resume your home medications.      Please do not blow your nose until 2 weeks after surgery.       Limit your activity to no strenuous activities until I see you for the first follow-up visit in approximately 2 weeks.      Medications - You were sent home with narcotic pain medication.  If you can tolerate the discomfort during your recovery by using just plain Tylenol or ibuprofen (advil), please do so.  However, do not hesitate to use the stronger pain medication if needed.  If you were sent home with an antibiotic, it is primarily used to help the healing process.  If it causes loose bowel movements or other signs of intolerance, it is appropriate to discontinue it.  By far the most important measure you can take to speed recovery, and maximize the chances of long term success of sinus surgery is using the sinus rinses at least three time per day for the first month after surgery.       Start budesonide irrigations tomorrow.  Use 2 times daily for one month and then as directed.  Start Cedric Med saline irrigation tonight and use at least 3 times daily.   Continue twice daily budesonide irrigations and 2-3 times daily NeilMed saline irrigations for 1 month and  then as directed by Dr. Comer    Budesonide instructions  Make the saline solution using 2 packages of salt and previously boiled or distilled water.  This will make 240 ml of saline solution.  Mix the entire vial of budesonide into the solution.   Irrigate your nose 2 times a day with the warm budesonide solution using 1 bottle between nostrils in the morning, and one bottle at night.   Use plain maxwell med saline without the steroid 2-3 times during the afternoon    Perform gentle irrigation for the first week.  Starting 1 week after surgery, you can start to irrigate a bit more forcefully.  The more often you irrigate with the maxwell med saline, the faster you will heal.      At 3 weeks after surgery you may restart nasal steroids if needed (flonase, nasonex, etc).      Complications - Problems related to sinus surgery almost always are detected during the operation, and special instruction will be given in that situation.  However, unexpected things can happen, and are all related to the structures around the sinus cavities.  Symptoms that should alert you to a possible problem include: severe eye pain or eye swelling, persistent heavy bleeding from the nose, and high fevers with headache and neck pain.  Any of these symptoms should be called into my office or to the on call ENT if after hours.  The most common non-emergency complication of sinus surgery is the formation of scar tissue which can re-block the sinuses.  This is addressed below.    Follow-up -  As you have noted, there are quite a few follow-up visits after sinus surgery.  This is done to aggressively manage the most common complication of this technique, which is scar tissue blocking the sinuses.  These visits will require the examination of your nose and possibly removal of crusts of dry mucous and blood, with possible removal of early scar tissue.  Please prepare for these visits by using your sinus rinses.    If there are any questions or  issues with the above, or if there are other issues that concern you, always feel free to call the clinic and I am happy to speak with you as soon as I can.    Debby Comer D.O.  Otolaryngology/Head and Neck Surgery  Allergy    [phone]   extension [phone]          Debby Comer D.O.  Otolaryngology/Head and Neck Surgery  Allergy

## 2022-06-24 LAB — SARS-COV-2 RNA RESP QL NAA+PROBE: NEGATIVE

## 2022-06-27 ENCOUNTER — OFFICE VISIT (OUTPATIENT)
Dept: OTOLARYNGOLOGY | Facility: OTHER | Age: 62
End: 2022-06-27
Attending: OTOLARYNGOLOGY
Payer: COMMERCIAL

## 2022-06-27 VITALS
DIASTOLIC BLOOD PRESSURE: 90 MMHG | OXYGEN SATURATION: 97 % | RESPIRATION RATE: 18 BRPM | SYSTOLIC BLOOD PRESSURE: 150 MMHG | TEMPERATURE: 97.1 F | HEART RATE: 66 BPM

## 2022-06-27 DIAGNOSIS — J34.89 CONCHA BULLOSA: ICD-10-CM

## 2022-06-27 DIAGNOSIS — J34.3 NASAL TURBINATE HYPERTROPHY: Primary | ICD-10-CM

## 2022-06-27 DIAGNOSIS — J34.2 DNS (DEVIATED NASAL SEPTUM): ICD-10-CM

## 2022-06-27 PROCEDURE — G0463 HOSPITAL OUTPT CLINIC VISIT: HCPCS

## 2022-06-27 PROCEDURE — 31240 NSL/SNS NDSC CNCH BULL RESCJ: CPT | Mod: LT | Performed by: OTOLARYNGOLOGY

## 2022-06-27 PROCEDURE — 31240 NSL/SNS NDSC CNCH BULL RESCJ: CPT | Mod: LT

## 2022-06-27 PROCEDURE — C9046 COCAINE HCL NASAL SOLUTION: HCPCS

## 2022-06-27 PROCEDURE — 30520 REPAIR OF NASAL SEPTUM: CPT | Performed by: OTOLARYNGOLOGY

## 2022-06-27 PROCEDURE — 30520 REPAIR OF NASAL SEPTUM: CPT

## 2022-06-27 PROCEDURE — 30802 ABLATE INF TURBINATE SUBMUC: CPT | Performed by: OTOLARYNGOLOGY

## 2022-06-27 RX ORDER — COCAINE HYDROCHLORIDE 40 MG/ML
SOLUTION NASAL ONCE
Status: COMPLETED | OUTPATIENT
Start: 2022-06-27 | End: 2022-06-27

## 2022-06-27 RX ADMIN — COCAINE HYDROCHLORIDE: 40 SOLUTION NASAL at 10:34

## 2022-06-27 ASSESSMENT — PAIN SCALES - GENERAL: PAINLEVEL: SEVERE PAIN (6)

## 2022-06-27 NOTE — LETTER
6/27/2022         RE: Villa Ruiz  [address]  [address]  [facility] [address]        Dear Colleague,    Thank you for referring your patient, Villa Ruiz, to the [facility]. Please see a copy of my visit note below.    Otolaryngology Progress Note          Villa Ruiz is a 62 year old male  Presents for follow-up of chronic congestion.    I saw him on 512 and noted a septal deviation to the right with nearly 90% obstruction at the mid to superior septum,  bilateral turbinate hypertrophy and a left carlene    Surgical versus an office procedure was discussed and he elected to proceed with in office surgery    He was prescribed budesonide irrigations.  He has been using irrigations with some improvement on the left with improved air flow but continues to have complete right nasal obstruction        Physical Exam  BP (!) 150/90 (BP Location: Left arm, Patient Position: Sitting, Cuff Size: Adult Regular)   Pulse 66   Temp 97.1  F (36.2  C) (Tympanic)   Resp 18   SpO2 97%   General - The patient is well nourished and well developed, and appears to have good nutritional status.  Alert and oriented to person and place, interactive.  Head and Face - Normocephalic and atraumatic, with no gross asymmetry noted of the contour of the facial features.  The facial nerve is intact, with strong symmetric movements.  Eyes - Extraocular movements intact.   Nose - Nasal mucosa is pink and moist with no abnormal mucus.      PROCEDURE  Otolaryngology Operative Note     Pre-op Diagnosis: Deviated nasal septum, bilateral inferior turbinate hypertrophy, left carlene bullosa  Post-op Diagnosis:  Same    Procedures:    1.  Septoplasty  2.  Bilateral submucous reduction inferior turbinates  3.  Endoscopic septoplasty    Surgeon:  Debby Comer D.O.  Anesthesia:  General endotracheal  EBL:  5 ml  Findings: Preop septal deviation to the right with 90% obstruction along the inferior through mid and  superior septal deviation, left carlene  Complications:  none  Condition:  stable     Risks and complications of septoplasty, bilateral turbinate reduction were discussed include general anesthesia, bleeding, infection, septal perforation, anosmia, epiphora, CSF rhinorrhea, atrophic rhinitis, scar formation, numbness over the incision, need for additional surgery and no guarantee is made that the septum will be completely straight.  Understanding is expressed, all questions are answered and wishes are to proceed with surgical intervention.     Description of the Procedure  After surgical consent was obtained the patient was laid in a comfortable seated position. The patient was draped in the normal clean fashion and a timeout was taken.  The bed was placed into reverse Trendelenburg positioning.  A timeout was taken.  Cocaine pledgets were placed into the nares for several minutes and removed.  The septum, middle turbinates, inferior turbinates were anesthetized with 1% lidocaine with 1-100,000 epinephrine.  Another timeout was taken    Attention was first turned to the left side.  Using a 0 degree endoscope, the coblation plasma wand was advanced to the distal demarcation point to the posterior portion of the inferior turbinate.  Coblation at a setting of 6  was performed for 10 seconds, and the blade withdrawn to the distal demarcation with another 10 second coblation.  Upon withdrawing the wand, coagulation at a setting of 2 was used to achieve hemostasis.  Outfracture of the inferior turbinates was performed with a septal displacer.        Next I turned my attention to the left carlene bullosa.  I used a 0 endoscope and sickle blade and incised the left carlene I used conchal scissors to remove the lateral portion of the carlene.  Silver nitrate was used to achieve hemostasis.  There is improved access to the lateral nasal wall.    Next I placed the tract septoplasty balloon into the right nares and slowly inflated  the balloon to 8 mm atmospheric pressure.  The balloon was held inflated for 2 minutes deflated and a second inflation of 2 minutes was performed just anterior to the first.  The balloon was removed I then placed the balloon into the left nares and similarly performed 1 inflation up to 8 mm atmospheric pressure which was held for 2 minutes.  The balloon was removed.  Under endoscopy I anesthetized the right septum.  I used a 15 blade and made a hemitransfixion incision on the right side.  I used a Onesimo to elevate a mucoperichondrial flap.  I used a D knife Jer-Cut and Farhad forceps to remove the obstructive portion of the quadrangular cartilage and perpendicular plate of the ethmoid bone which was deviated into the right nares.  I used a D knife to remove a spur along the floor on the right.  I was unable to reapproximate the flaps with a Belva the septum is midline and intact postoperative photos were taken.  He voiced improved breathing through his nose.  I placed one 4-0 Vicryl suture in a horizontal mattress fashion to reapproximate the mucosal flaps.  A Belva was used to further medialized the septum and smooth the mucosal edges.    I then turned my attention to the right turbinate reduction and outfracture which was performed in a similar fashion with similar results.  Hemostasis was achieved with scant use of silver nitrate and is adequate.  There is no bleeding.  No packing was used.    The patient tolerated the procedure well.        Impression/Plan  Villa Ruiz is a 62 year old male    ICD-10-CM    1. Nasal turbinate hypertrophy  J34.3 cocaine nasal solution SOLN     CAUTERY/ABLATION TURBINATES, INTRAMURAL     HC OPEN EXCISION CHELO BULLOSA MIDDLE TURBINATE OR MID TURBINATE RESEC     NC BALLOON SEPTOPLASTY   2. DNS (deviated nasal septum)  J34.2 CAUTERY/ABLATION TURBINATES, INTRAMURAL     HC OPEN EXCISION CHELO BULLOSA MIDDLE TURBINATE OR MID TURBINATE RESEC     NC BALLOON SEPTOPLASTY   3. Chelo  bullosa  J34.89 CAUTERY/ABLATION TURBINATES, INTRAMURAL     HC OPEN EXCISION CHELO BULLOSA MIDDLE TURBINATE OR MID TURBINATE RESEC     MS BALLOON SEPTOPLASTY       Continue Budesonide Rinses Twice Daily      Instructions for Sinus Surgery    Recovery - Everyone recovers differently from a general anesthetic.  Symptoms such as fatigue, nausea, light-headedness, and sometimes a low grade fever (up to 100 degrees) are not unusual.  As your body removes the anesthetic drugs from circulation, these symptoms will resolve.  Your nose will be sore after surgery, and you may even have symptoms similar to a sinus infection with headache, congestion, and pressure.  These will resolve with healing.  For several days you may experience bloody drainage from the nose, please use the drip pad as necessary for this.  If there is persistent bleeding, please call the office during business hours or the on call ENT physician after hours.  There are no diet restrictions after sinus surgery, and you can resume your home medications.      Please do not blow your nose until 2 weeks after surgery.       Limit your activity to no strenuous activities until I see you for the first follow-up visit in approximately 2 weeks.      Medications - You were sent home with narcotic pain medication.  If you can tolerate the discomfort during your recovery by using just plain Tylenol or ibuprofen (advil), please do so.  However, do not hesitate to use the stronger pain medication if needed.  If you were sent home with an antibiotic, it is primarily used to help the healing process.  If it causes loose bowel movements or other signs of intolerance, it is appropriate to discontinue it.  By far the most important measure you can take to speed recovery, and maximize the chances of long term success of sinus surgery is using the sinus rinses at least three time per day for the first month after surgery.       Start budesonide irrigations tomorrow.  Use 2  times daily for one month and then as directed.  Start Maxwell Med saline irrigation tonight and use at least 3 times daily.   Continue twice daily budesonide irrigations and 2-3 times daily NeilMed saline irrigations for 1 month and then as directed by Dr. Comer    Budesonide instructions  Make the saline solution using 2 packages of salt and previously boiled or distilled water.  This will make 240 ml of saline solution.  Mix the entire vial of budesonide into the solution.   Irrigate your nose 2 times a day with the warm budesonide solution using 1 bottle between nostrils in the morning, and one bottle at night.   Use plain maxwell med saline without the steroid 2-3 times during the afternoon    Perform gentle irrigation for the first week.  Starting 1 week after surgery, you can start to irrigate a bit more forcefully.  The more often you irrigate with the maxwell med saline, the faster you will heal.      At 3 weeks after surgery you may restart nasal steroids if needed (flonase, nasonex, etc).      Complications - Problems related to sinus surgery almost always are detected during the operation, and special instruction will be given in that situation.  However, unexpected things can happen, and are all related to the structures around the sinus cavities.  Symptoms that should alert you to a possible problem include: severe eye pain or eye swelling, persistent heavy bleeding from the nose, and high fevers with headache and neck pain.  Any of these symptoms should be called into my office or to the on call ENT if after hours.  The most common non-emergency complication of sinus surgery is the formation of scar tissue which can re-block the sinuses.  This is addressed below.    Follow-up -  As you have noted, there are quite a few follow-up visits after sinus surgery.  This is done to aggressively manage the most common complication of this technique, which is scar tissue blocking the sinuses.  These visits will  require the examination of your nose and possibly removal of crusts of dry mucous and blood, with possible removal of early scar tissue.  Please prepare for these visits by using your sinus rinses.    If there are any questions or issues with the above, or if there are other issues that concern you, always feel free to call the clinic and I am happy to speak with you as soon as I can.    Debby Comer D.O.  Otolaryngology/Head and Neck Surgery  Allergy    [phone]   extension [phone]          Debby Comer D.O.  Otolaryngology/Head and Neck Surgery  Allergy          Again, thank you for allowing me to participate in the care of your patient.        Sincerely,        Debby Comer MD

## 2022-06-27 NOTE — PATIENT INSTRUCTIONS
Thank you for allowing Dr. Comer and our ENT team to participate in your care.  If your medications are too expensive, please give the nurse a call.  We can possibly change this medication.  If you have a scheduling or an appointment question please contact our Health Unit Coordinator at their direct line [phone].   ALL nursing questions or concerns can be directed to your ENT nurse at: [phone] - Patricia    Continue Budesonide Rinses Twice Daily      Instructions for Sinus Surgery    Recovery - Everyone recovers differently from a general anesthetic.  Symptoms such as fatigue, nausea, light-headedness, and sometimes a low grade fever (up to 100 degrees) are not unusual.  As your body removes the anesthetic drugs from circulation, these symptoms will resolve.  Your nose will be sore after surgery, and you may even have symptoms similar to a sinus infection with headache, congestion, and pressure.  These will resolve with healing.  For several days you may experience bloody drainage from the nose, please use the drip pad as necessary for this.  If there is persistent bleeding, please call the office during business hours or the on call ENT physician after hours.  There are no diet restrictions after sinus surgery, and you can resume your home medications.      Please do not blow your nose until 2 weeks after surgery.       Limit your activity to no strenuous activities until I see you for the first follow-up visit in approximately 2 weeks.      Medications - You were sent home with narcotic pain medication.  If you can tolerate the discomfort during your recovery by using just plain Tylenol or ibuprofen (advil), please do so.  However, do not hesitate to use the stronger pain medication if needed.  If you were sent home with an antibiotic, it is primarily used to help the healing process.  If it causes loose bowel movements or other signs of intolerance, it is appropriate to discontinue it.  By far the  most important measure you can take to speed recovery, and maximize the chances of long term success of sinus surgery is using the sinus rinses at least three time per day for the first month after surgery.       Start budesonide irrigations tomorrow.  Use 2 times daily for one month and then as directed.  Start Maxwell Med saline irrigation tonight and use at least 3 times daily.   Continue twice daily budesonide irrigations and 2-3 times daily NeilMed saline irrigations for 1 month and then as directed by Dr. Comer    Budesonide instructions  Make the saline solution using 2 packages of salt and previously boiled or distilled water.  This will make 240 ml of saline solution.  Mix the entire vial of budesonide into the solution.   Irrigate your nose 2 times a day with the warm budesonide solution using 1 bottle between nostrils in the morning, and one bottle at night.   Use plain maxwell med saline without the steroid 2-3 times during the afternoon    Perform gentle irrigation for the first week.  Starting 1 week after surgery, you can start to irrigate a bit more forcefully.  The more often you irrigate with the maxwell med saline, the faster you will heal.      At 3 weeks after surgery you may restart nasal steroids if needed (flonase, nasonex, etc).      Complications - Problems related to sinus surgery almost always are detected during the operation, and special instruction will be given in that situation.  However, unexpected things can happen, and are all related to the structures around the sinus cavities.  Symptoms that should alert you to a possible problem include: severe eye pain or eye swelling, persistent heavy bleeding from the nose, and high fevers with headache and neck pain.  Any of these symptoms should be called into my office or to the on call ENT if after hours.  The most common non-emergency complication of sinus surgery is the formation of scar tissue which can re-block the sinuses.  This is  addressed below.    Follow-up -  As you have noted, there are quite a few follow-up visits after sinus surgery.  This is done to aggressively manage the most common complication of this technique, which is scar tissue blocking the sinuses.  These visits will require the examination of your nose and possibly removal of crusts of dry mucous and blood, with possible removal of early scar tissue.  Please prepare for these visits by using your sinus rinses.    If there are any questions or issues with the above, or if there are other issues that concern you, always feel free to call the clinic and I am happy to speak with you as soon as I can.    Debby Comer D.O.  Otolaryngology/Head and Neck Surgery  Allergy    [phone]   extension [phone]

## 2022-06-27 NOTE — NURSING NOTE
"Chief Complaint   Patient presents with     Procedure     Septoplasty, Turbinate Reduction       Initial BP (!) 150/90 (BP Location: Left arm, Patient Position: Sitting, Cuff Size: Adult Regular)   Pulse 66   Temp 97.1  F (36.2  C) (Tympanic)   Resp 18   SpO2 97%  Estimated body mass index is 30.28 kg/m  as calculated from the following:    Height as of 4/18/22: 1.778 m (5' 10\").    Weight as of 5/12/22: 95.7 kg (211 lb).  Medication Reconciliation: complete  Honey Gonzalez  "

## 2022-06-28 ENCOUNTER — TELEPHONE (OUTPATIENT)
Dept: OTOLARYNGOLOGY | Facility: OTHER | Age: 62
End: 2022-06-28

## 2022-06-28 DIAGNOSIS — Z01.818 PREOPERATIVE EXAMINATION: ICD-10-CM

## 2022-06-28 DIAGNOSIS — Z98.890 POSTOPERATIVE STATE: Primary | ICD-10-CM

## 2022-06-28 RX ORDER — DIAZEPAM 5 MG
5 TABLET ORAL
Qty: 3 TABLET | Refills: 0 | Status: SHIPPED | OUTPATIENT
Start: 2022-06-28 | End: 2022-07-01

## 2022-06-28 NOTE — TELEPHONE ENCOUNTER
This patient calls stating that everything so far has been going well since his procedure yesterday. He did take one of the Valium to sleep last night and has 1 more for tonight. He is wondering if he can get a few more to help with sleep the next few days. Please Advise.     Pharmacy: [facility]    I sent in 5 mg valium to take at bedtime prh, 3 tabs  Can be addictive  Talk to Dr. Moody about safer longer term sleep aids    Make sure adhering to basic sleep hygiene measures    Read the Sleep Solution

## 2022-07-02 DIAGNOSIS — J01.40 SUBACUTE PANSINUSITIS: ICD-10-CM

## 2022-07-05 RX ORDER — BUDESONIDE 0.5 MG/2ML
INHALANT ORAL
Qty: 60 ML | Refills: 4 | Status: SHIPPED | OUTPATIENT
Start: 2022-07-05 | End: 2023-07-12

## 2022-07-12 ENCOUNTER — OFFICE VISIT (OUTPATIENT)
Dept: OTOLARYNGOLOGY | Facility: OTHER | Age: 62
End: 2022-07-12
Attending: NURSE PRACTITIONER
Payer: COMMERCIAL

## 2022-07-12 VITALS
HEIGHT: 70 IN | HEART RATE: 81 BPM | DIASTOLIC BLOOD PRESSURE: 82 MMHG | OXYGEN SATURATION: 95 % | BODY MASS INDEX: 30.21 KG/M2 | WEIGHT: 211 LBS | TEMPERATURE: 99 F | SYSTOLIC BLOOD PRESSURE: 148 MMHG

## 2022-07-12 DIAGNOSIS — Z98.890 S/P NASAL SEPTOPLASTY: Primary | ICD-10-CM

## 2022-07-12 DIAGNOSIS — Z71.6 TOBACCO ABUSE COUNSELING: ICD-10-CM

## 2022-07-12 PROCEDURE — G0463 HOSPITAL OUTPT CLINIC VISIT: HCPCS

## 2022-07-12 PROCEDURE — 99024 POSTOP FOLLOW-UP VISIT: CPT | Performed by: NURSE PRACTITIONER

## 2022-07-12 ASSESSMENT — PAIN SCALES - GENERAL: PAINLEVEL: NO PAIN (0)

## 2022-07-12 NOTE — LETTER
7/12/2022         RE: Villa Ruiz  [address]  [address]  [facility] [address]        Dear Colleague,    Thank you for referring your patient, Villa Ruiz, to the [facility]. Please see a copy of my visit note below.      Otolaryngology Note         Chief Complaint:     Patient presents with:  Follow Up: S/P 06/27 In office Septoplast and TR            History of Present Illness:     Villa Ruiz is a 62 year old male who presents today for his first post op FESS appointment.  He reports he has been doing very well.  He has been able to breathe through his nose much better and is very happy with this.  He has been rinsing as directed.  He does feel like he gets more plugged after he uses to packets of sodium bicarb versus 1 packet.  Advised him its okay to use just 1 packet.  No concerns for pain.  There has been no fever, chills, large amounts of bleeding, visual changes or neck pain.          Surgical Details:      In office septoplasty, turbinate reduction, excision of carlene bullosa completed on 6/27/2022         Medications:     Current Outpatient Rx   Medication Sig Dispense Refill     budesonide (PULMICORT) 0.5 MG/2ML neb solution  ML ALAYNA MED SINUS RINSE, ADD 1 VIAL BUDESONIDE, RINSE 1/2 (ONE HALF) BOTTLE IN EACH NOSTRIL TWICE DAILY 60 mL 4     cetirizine (ZYRTEC) 10 MG tablet Take 1 tablet (10 mg) by mouth daily 90 tablet 3     diazepam (VALIUM) 10 MG tablet Take one tab half hour prior to procedure.  Do not drive. 2 tablet 0     diclofenac (VOLTAREN) 1 % topical gel APPLY TOPICALLY AS NEEDED FOR MODERATE PAIN 100 g 0     HYDROcodone-acetaminophen (NORCO) 5-325 MG tablet Take one tab 30-60 minutes before procedure. DO NOT DRIVE 18 tablet 0     ipratropium (ATROVENT) 0.03 % nasal spray Spray 2 sprays into both nostrils 3 times daily as needed for rhinitis 30 mL 1     levocetirizine (XYZAL) 5 MG tablet Take 1 tablet (5 mg) by mouth every evening 30 tablet 3     VENTOLIN  " (90 Base) MCG/ACT inhaler INHALE 2 PUFFS BY MOUTH EVERY 6 HOURS 18 g 3     fluticasone (FLONASE) 50 MCG/ACT nasal spray Spray 1 spray into both nostrils daily (Patient not taking: Reported on 7/12/2022) 16 g 1            Allergies:     Allergies: Augmentin          Past Medical History:     No past medical history on file.         Past Surgical History:     No past surgical history on file.           Social History:     Social History     Tobacco Use     Smoking status: Current Every Day Smoker     Packs/day: 0.75     Years: 6.00     Pack years: 4.50     Types: Cigarettes, Cigars     Start date: 1/1/2012     Smokeless tobacco: Never Used     Tobacco comment: pt denied QP 6/23/20   Substance Use Topics     Alcohol use: No     Alcohol/week: 0.0 standard drinks     Drug use: No            Review of Systems:     ROS: See HPI         Physical Exam:     BP (!) 148/82 (BP Location: Right arm, Patient Position: Sitting, Cuff Size: Adult Large)   Pulse 81   Temp 99  F (37.2  C) (Tympanic)   Ht 1.778 m (5' 10\")   Wt 95.7 kg (211 lb)   SpO2 95%   BMI 30.28 kg/m      General - The patient is well nourished and well developed, and appears to have good nutritional status.  Alert and oriented to person and place, answers questions and cooperates with examination appropriately.   Head and Face - Normocephalic and atraumatic, with no gross asymmetry noted.  The facial nerve is intact, with strong symmetric movements.  Voice and Breathing - The patient was breathing comfortably without the use of accessory muscles. There was no wheezing, stridor. The patients voice was clear and strong, and had appropriate pitch and quality.  Ears - External ear normal. Canals are patent. Right tympanic membrane is intact without effusion, retraction or mass. Left tympanic membrane is intact without effusion, retraction or mass.  Eyes - Extraocular movements intact, and the pupils were reactive to light. Sclera were not icteric or " injected, conjunctiva were pink and moist.   Mouth - Examination of the oral cavity showed pink, healthy oral mucosa. Dentition in good condition. No lesions or ulcerations noted. The tongue was mobile and midline.   Throat - The walls of the oropharynx were smooth, pink, moist, symmetric, and had no lesions or ulcerations.  The tonsillar pillars and soft palate were symmetric. The uvula was midline on elevation.    Neck - No palpable lymphadenopathy.  Nose - External contour is symmetric, no gross deflection or scars.  Nasal mucosa is pink and moist with no abnormal mucus.      To evaluate the nose and sinuses in the post operative state, I performed rigid nasal endoscopy.  I sprayed both nares with 2 sprays lidocaine and neosynephrine.     I began with the RIGHT side using a 0 degree rigid nasal endoscope, and then similarly examined the LEFT side     Findings: Septum is intact and grossly midline, there is continued edema  Inferior turbinates:   Right inferior turbinate has small synechiae band to the septum, I was able to easily separate the band and lateralized the turbinate with minimal oozing.  The left turbinate has minimal edema, no tim synechiae.  I did use a Fifield and lateralize the left turbinate in addition to the right.  Left MT is healing nicely, I did debride some normal postoperative secretions with #8 Beard  Right middle turbinate and middle meatus looks normal   The patient tolerated the procedure well            Assessment and Plan:       ICD-10-CM    1. S/P nasal septoplasty  Z98.890    2. Tobacco abuse counseling  Z71.6        Continue budesonide irrigations 2 times per day, ok to use just 1 salt package instead of 2 in each bottle    Start flonase 2 sprays once daily - wait until 7/18 to start    Follow up in 2 weeks for recheck    Tobacco cessation was strongly encouraged.  The associated risk of head and neck cancer was discussed.  Every year of cessation offers health benefits. This was  discussed with the patient today and they voiced understanding.  Quit tools and a nicotine patch were offered.      Veronica RIDERC  [facility]          Again, thank you for allowing me to participate in the care of your patient.        Sincerely,        Veronica Chou, NP

## 2022-07-12 NOTE — NURSING NOTE
"Chief Complaint   Patient presents with     Follow Up     S/P 06/27 In office Septoplast and TR        Initial BP (!) 148/82 (BP Location: Right arm, Patient Position: Sitting, Cuff Size: Adult Large)   Pulse 81   Temp 99  F (37.2  C) (Tympanic)   Ht 1.778 m (5' 10\")   Wt 95.7 kg (211 lb)   SpO2 95%   BMI 30.28 kg/m   Estimated body mass index is 30.28 kg/m  as calculated from the following:    Height as of this encounter: 1.778 m (5' 10\").    Weight as of this encounter: 95.7 kg (211 lb).  Medication Reconciliation: complete  Monique Rain LPN    "

## 2022-07-12 NOTE — PATIENT INSTRUCTIONS
Thank you for allowing Veronica Chou and our ENT team to participate in your care.  If your medications are too expensive, please give the nurse a call.  We can possibly change this medication.  If you have a scheduling or an appointment question please contact our Health Unit Coordinator at their direct line [phone] ext [phone].   ALL nursing questions or concerns can be directed to your ENT nurse at: [phone] - Monique     Continue budesonide irrigations 2 times per day, ok to use just 1 salt package instead of 2 in each bottle    Start flonase 2 sprays once daily - wait until 7/18 to start    Follow up in 2 weeks for recheck

## 2022-07-12 NOTE — PROGRESS NOTES
Otolaryngology Note         Chief Complaint:     Patient presents with:  Follow Up: S/P 06/27 In office Septoplast and TR            History of Present Illness:     Villa Ruiz is a 62 year old male who presents today for his first post op FESS appointment.  He reports he has been doing very well.  He has been able to breathe through his nose much better and is very happy with this.  He has been rinsing as directed.  He does feel like he gets more plugged after he uses to packets of sodium bicarb versus 1 packet.  Advised him its okay to use just 1 packet.  No concerns for pain.  There has been no fever, chills, large amounts of bleeding, visual changes or neck pain.          Surgical Details:      In office septoplasty, turbinate reduction, excision of carlene bullosa completed on 6/27/2022         Medications:     Current Outpatient Rx   Medication Sig Dispense Refill     budesonide (PULMICORT) 0.5 MG/2ML neb solution  ML ALAYNA MED SINUS RINSE, ADD 1 VIAL BUDESONIDE, RINSE 1/2 (ONE HALF) BOTTLE IN EACH NOSTRIL TWICE DAILY 60 mL 4     cetirizine (ZYRTEC) 10 MG tablet Take 1 tablet (10 mg) by mouth daily 90 tablet 3     diazepam (VALIUM) 10 MG tablet Take one tab half hour prior to procedure.  Do not drive. 2 tablet 0     diclofenac (VOLTAREN) 1 % topical gel APPLY TOPICALLY AS NEEDED FOR MODERATE PAIN 100 g 0     HYDROcodone-acetaminophen (NORCO) 5-325 MG tablet Take one tab 30-60 minutes before procedure. DO NOT DRIVE 18 tablet 0     ipratropium (ATROVENT) 0.03 % nasal spray Spray 2 sprays into both nostrils 3 times daily as needed for rhinitis 30 mL 1     levocetirizine (XYZAL) 5 MG tablet Take 1 tablet (5 mg) by mouth every evening 30 tablet 3     VENTOLIN  (90 Base) MCG/ACT inhaler INHALE 2 PUFFS BY MOUTH EVERY 6 HOURS 18 g 3     fluticasone (FLONASE) 50 MCG/ACT nasal spray Spray 1 spray into both nostrils daily (Patient not taking: Reported on 7/12/2022) 16 g 1            Allergies:  "    Allergies: Augmentin          Past Medical History:     No past medical history on file.         Past Surgical History:     No past surgical history on file.           Social History:     Social History     Tobacco Use     Smoking status: Current Every Day Smoker     Packs/day: 0.75     Years: 6.00     Pack years: 4.50     Types: Cigarettes, Cigars     Start date: 1/1/2012     Smokeless tobacco: Never Used     Tobacco comment: pt denied QP 6/23/20   Substance Use Topics     Alcohol use: No     Alcohol/week: 0.0 standard drinks     Drug use: No            Review of Systems:     ROS: See HPI         Physical Exam:     BP (!) 148/82 (BP Location: Right arm, Patient Position: Sitting, Cuff Size: Adult Large)   Pulse 81   Temp 99  F (37.2  C) (Tympanic)   Ht 1.778 m (5' 10\")   Wt 95.7 kg (211 lb)   SpO2 95%   BMI 30.28 kg/m      General - The patient is well nourished and well developed, and appears to have good nutritional status.  Alert and oriented to person and place, answers questions and cooperates with examination appropriately.   Head and Face - Normocephalic and atraumatic, with no gross asymmetry noted.  The facial nerve is intact, with strong symmetric movements.  Voice and Breathing - The patient was breathing comfortably without the use of accessory muscles. There was no wheezing, stridor. The patients voice was clear and strong, and had appropriate pitch and quality.  Ears - External ear normal. Canals are patent. Right tympanic membrane is intact without effusion, retraction or mass. Left tympanic membrane is intact without effusion, retraction or mass.  Eyes - Extraocular movements intact, and the pupils were reactive to light. Sclera were not icteric or injected, conjunctiva were pink and moist.   Mouth - Examination of the oral cavity showed pink, healthy oral mucosa. Dentition in good condition. No lesions or ulcerations noted. The tongue was mobile and midline.   Throat - The walls of the " oropharynx were smooth, pink, moist, symmetric, and had no lesions or ulcerations.  The tonsillar pillars and soft palate were symmetric. The uvula was midline on elevation.    Neck - No palpable lymphadenopathy.  Nose - External contour is symmetric, no gross deflection or scars.  Nasal mucosa is pink and moist with no abnormal mucus.      To evaluate the nose and sinuses in the post operative state, I performed rigid nasal endoscopy.  I sprayed both nares with 2 sprays lidocaine and neosynephrine.     I began with the RIGHT side using a 0 degree rigid nasal endoscope, and then similarly examined the LEFT side     Findings: Septum is intact and grossly midline, there is continued edema  Inferior turbinates:   Right inferior turbinate has small synechiae band to the septum, I was able to easily separate the band and lateralized the turbinate with minimal oozing.  The left turbinate has minimal edema, no tim synechiae.  I did use a [place] and lateralize the left turbinate in addition to the right.  Left MT is healing nicely, I did debride some normal postoperative secretions with #8 Beard  Right middle turbinate and middle meatus looks normal   The patient tolerated the procedure well            Assessment and Plan:       ICD-10-CM    1. S/P nasal septoplasty  Z98.890    2. Tobacco abuse counseling  Z71.6        Continue budesonide irrigations 2 times per day, ok to use just 1 salt package instead of 2 in each bottle    Start flonase 2 sprays once daily - wait until 7/18 to start    Follow up in 2 weeks for recheck    Tobacco cessation was strongly encouraged.  The associated risk of head and neck cancer was discussed.  Every year of cessation offers health benefits. This was discussed with the patient today and they voiced understanding.  Quit tools and a nicotine patch were offered.      Veronica RIDERC  [facility]

## 2022-07-26 ENCOUNTER — OFFICE VISIT (OUTPATIENT)
Dept: OTOLARYNGOLOGY | Facility: OTHER | Age: 62
End: 2022-07-26
Attending: NURSE PRACTITIONER
Payer: COMMERCIAL

## 2022-07-26 VITALS
WEIGHT: 215 LBS | SYSTOLIC BLOOD PRESSURE: 132 MMHG | HEIGHT: 70 IN | OXYGEN SATURATION: 95 % | TEMPERATURE: 97.4 F | HEART RATE: 71 BPM | DIASTOLIC BLOOD PRESSURE: 82 MMHG | BODY MASS INDEX: 30.78 KG/M2

## 2022-07-26 DIAGNOSIS — Z98.890 S/P NASAL SEPTOPLASTY: Primary | ICD-10-CM

## 2022-07-26 DIAGNOSIS — J34.89 NASAL OBSTRUCTION: ICD-10-CM

## 2022-07-26 DIAGNOSIS — R09.81 NASAL CONGESTION: ICD-10-CM

## 2022-07-26 DIAGNOSIS — J32.0 LEFT MAXILLARY SINUSITIS: ICD-10-CM

## 2022-07-26 PROCEDURE — 99024 POSTOP FOLLOW-UP VISIT: CPT | Performed by: NURSE PRACTITIONER

## 2022-07-26 PROCEDURE — G0463 HOSPITAL OUTPT CLINIC VISIT: HCPCS

## 2022-07-26 RX ORDER — IPRATROPIUM BROMIDE 21 UG/1
2 SPRAY, METERED NASAL 3 TIMES DAILY PRN
Qty: 30 ML | Refills: 1 | Status: SHIPPED | OUTPATIENT
Start: 2022-07-26 | End: 2023-07-12

## 2022-07-26 RX ORDER — FLUTICASONE PROPIONATE 50 MCG
1 SPRAY, SUSPENSION (ML) NASAL DAILY
Qty: 16 G | Refills: 1 | Status: SHIPPED | OUTPATIENT
Start: 2022-07-26 | End: 2023-07-12

## 2022-07-26 ASSESSMENT — PAIN SCALES - GENERAL: PAINLEVEL: NO PAIN (0)

## 2022-07-26 NOTE — LETTER
7/26/2022         RE: Villa Ruiz  [address]  [address]  [facility] [place] [address]        Dear Colleague,    Thank you for referring your patient, Villa Ruiz, to the [facility]. Please see a copy of my visit note below.    Otolaryngology Note         Chief Complaint:     Patient presents with:  Follow Up: S/P Septoplasty/ TR            History of Present Illness:     Villa Ruiz is a 62 year old male seen today for follow-up septoplasty and turbinate reduction with excision of carlene bullosa done in the office on 6/27/2022.  I last saw Villa on 7/12/2022.  At that time he was doing well.  He is able to breathe through his nose much better.  He did have a small synechiae band from the left IT syndechiae to the septum that was removed at his postop.  I also lateralized both inferior turbinates with a [place].  He is here today for recheck to assure no further synechiae.    He has been breathing through his nose well with some left sided drainage.  He used ipratropium with improvement in drainage.           Medications:     Current Outpatient Rx   Medication Sig Dispense Refill     budesonide (PULMICORT) 0.5 MG/2ML neb solution  ML ALAYNA MED SINUS RINSE, ADD 1 VIAL BUDESONIDE, RINSE 1/2 (ONE HALF) BOTTLE IN EACH NOSTRIL TWICE DAILY 60 mL 4     cetirizine (ZYRTEC) 10 MG tablet Take 1 tablet (10 mg) by mouth daily 90 tablet 3     diazepam (VALIUM) 10 MG tablet Take one tab half hour prior to procedure.  Do not drive. 2 tablet 0     diclofenac (VOLTAREN) 1 % topical gel APPLY TOPICALLY AS NEEDED FOR MODERATE PAIN 100 g 0     fluticasone (FLONASE) 50 MCG/ACT nasal spray Spray 1 spray into both nostrils daily 16 g 1     HYDROcodone-acetaminophen (NORCO) 5-325 MG tablet Take one tab 30-60 minutes before procedure. DO NOT DRIVE 18 tablet 0     ipratropium (ATROVENT) 0.03 % nasal spray Spray 2 sprays into both nostrils 3 times daily as needed for rhinitis 30 mL 1     levocetirizine (XYZAL) 5 MG  "tablet Take 1 tablet (5 mg) by mouth every evening 30 tablet 3     VENTOLIN  (90 Base) MCG/ACT inhaler INHALE 2 PUFFS BY MOUTH EVERY 6 HOURS 18 g 3            Allergies:     Allergies: Augmentin          Past Medical History:     No past medical history on file.         Past Surgical History:     No past surgical history on file.    ENT family history reviewed         Social History:     Social History     Tobacco Use     Smoking status: Current Every Day Smoker     Packs/day: 0.75     Years: 6.00     Pack years: 4.50     Types: Cigarettes, Cigars     Start date: 1/1/2012     Smokeless tobacco: Never Used     Tobacco comment: pt denied QP 6/23/20   Substance Use Topics     Alcohol use: No     Alcohol/week: 0.0 standard drinks     Drug use: No            Review of Systems:     ROS: See HPI         Physical Exam:     /82 (BP Location: Right arm, Patient Position: Sitting, Cuff Size: Adult Large)   Pulse 71   Temp 97.4  F (36.3  C) (Tympanic)   Ht 1.778 m (5' 10\")   Wt 97.5 kg (215 lb)   SpO2 95%   BMI 30.85 kg/m      General - The patient is well nourished and well developed, and appears to have good nutritional status.  Alert and oriented to person and place, answers questions and cooperates with examination appropriately.   Head and Face - Normocephalic and atraumatic, with no gross asymmetry noted.  The facial nerve is intact, with strong symmetric movements.  Voice and Breathing - The patient was breathing comfortably without the use of accessory muscles. There was no wheezing, stridor. The patients voice was clear and strong, and had appropriate pitch and quality.  Ears - External ear normal.  The ears were examined under binocular microscopy and with otoscope.  The left canal is patent, left tympanic membrane is intact without effusion or retraction.  The right canal has moderate ceruminosis,  this was cleaned with #7 suction and cupped forceps.  Right tympanic membrane is intact without " effusion or retraction.   Eyes - Extraocular movements intact, and the pupils were reactive to light. Sclera were not icteric or injected, conjunctiva were pink and moist.   Neck - No palpable lymphadenopathy.  Nose - External contour is symmetric, no gross deflection or scars.  Nasal mucosa is pink and moist with no abnormal mucus.       To evaluate the nose and sinuses in the post operative state, I performed rigid nasal endoscopy.  I sprayed both nares with 2 sprays lidocaine and neosynephrine.     I began with the RIGHT side using a 0 degree rigid nasal endoscope, and then similarly examined the LEFT side     Findings: Septum is intact and grossly midline, postoperative edema is improving  Inferior turbinates: Right IT is reduced and lateralized.  Left IT is reduced and lateralized, no further synechiae.  Left MT is healing nicely, I did debride some normal postoperative secretions with #8 Beard  Right middle turbinate and middle meatus looks normal   The patient tolerated the procedure well         Assessment and Plan:       ICD-10-CM    1. S/P nasal septoplasty  Z98.890    2. Left maxillary sinusitis  J32.0 fluticasone (FLONASE) 50 MCG/ACT nasal spray   3. Nasal obstruction  J34.89 ipratropium (ATROVENT) 0.03 % nasal spray   4. Nasal congestion  R09.81 ipratropium (ATROVENT) 0.03 % nasal spray       Start flonase 2 sprays to each nostril daily  Use the ipratropium as needed for nasal drainage  Follow up as needed    Veronica GARCIA  [facility]        Again, thank you for allowing me to participate in the care of your patient.        Sincerely,        Veronica Chou NP

## 2022-07-26 NOTE — PROGRESS NOTES
Otolaryngology Note         Chief Complaint:     Patient presents with:  Follow Up: S/P Septoplasty/ TR            History of Present Illness:     Villa Ruiz is a 62 year old male seen today for follow-up septoplasty and turbinate reduction with excision of carlene bullosa done in the office on 6/27/2022.  I last saw Villa on 7/12/2022.  At that time he was doing well.  He is able to breathe through his nose much better.  He did have a small synechiae band from the left IT syndechiae to the septum that was removed at his postop.  I also lateralized both inferior turbinates with a [place].  He is here today for recheck to assure no further synechiae.    He has been breathing through his nose well with some left sided drainage.  He used ipratropium with improvement in drainage.           Medications:     Current Outpatient Rx   Medication Sig Dispense Refill     budesonide (PULMICORT) 0.5 MG/2ML neb solution  ML ALAYNA MED SINUS RINSE, ADD 1 VIAL BUDESONIDE, RINSE 1/2 (ONE HALF) BOTTLE IN EACH NOSTRIL TWICE DAILY 60 mL 4     cetirizine (ZYRTEC) 10 MG tablet Take 1 tablet (10 mg) by mouth daily 90 tablet 3     diazepam (VALIUM) 10 MG tablet Take one tab half hour prior to procedure.  Do not drive. 2 tablet 0     diclofenac (VOLTAREN) 1 % topical gel APPLY TOPICALLY AS NEEDED FOR MODERATE PAIN 100 g 0     fluticasone (FLONASE) 50 MCG/ACT nasal spray Spray 1 spray into both nostrils daily 16 g 1     HYDROcodone-acetaminophen (NORCO) 5-325 MG tablet Take one tab 30-60 minutes before procedure. DO NOT DRIVE 18 tablet 0     ipratropium (ATROVENT) 0.03 % nasal spray Spray 2 sprays into both nostrils 3 times daily as needed for rhinitis 30 mL 1     levocetirizine (XYZAL) 5 MG tablet Take 1 tablet (5 mg) by mouth every evening 30 tablet 3     VENTOLIN  (90 Base) MCG/ACT inhaler INHALE 2 PUFFS BY MOUTH EVERY 6 HOURS 18 g 3            Allergies:     Allergies: Augmentin          Past Medical History:     No past  "medical history on file.         Past Surgical History:     No past surgical history on file.    ENT family history reviewed         Social History:     Social History     Tobacco Use     Smoking status: Current Every Day Smoker     Packs/day: 0.75     Years: 6.00     Pack years: 4.50     Types: Cigarettes, Cigars     Start date: 1/1/2012     Smokeless tobacco: Never Used     Tobacco comment: pt denied QP 6/23/20   Substance Use Topics     Alcohol use: No     Alcohol/week: 0.0 standard drinks     Drug use: No            Review of Systems:     ROS: See HPI         Physical Exam:     /82 (BP Location: Right arm, Patient Position: Sitting, Cuff Size: Adult Large)   Pulse 71   Temp 97.4  F (36.3  C) (Tympanic)   Ht 1.778 m (5' 10\")   Wt 97.5 kg (215 lb)   SpO2 95%   BMI 30.85 kg/m      General - The patient is well nourished and well developed, and appears to have good nutritional status.  Alert and oriented to person and place, answers questions and cooperates with examination appropriately.   Head and Face - Normocephalic and atraumatic, with no gross asymmetry noted.  The facial nerve is intact, with strong symmetric movements.  Voice and Breathing - The patient was breathing comfortably without the use of accessory muscles. There was no wheezing, stridor. The patients voice was clear and strong, and had appropriate pitch and quality.  Ears - External ear normal.  The ears were examined under binocular microscopy and with otoscope.  The left canal is patent, left tympanic membrane is intact without effusion or retraction.  The right canal has moderate ceruminosis,  this was cleaned with #7 suction and cupped forceps.  Right tympanic membrane is intact without effusion or retraction.   Eyes - Extraocular movements intact, and the pupils were reactive to light. Sclera were not icteric or injected, conjunctiva were pink and moist.   Neck - No palpable lymphadenopathy.  Nose - External contour is symmetric, " no gross deflection or scars.  Nasal mucosa is pink and moist with no abnormal mucus.       To evaluate the nose and sinuses in the post operative state, I performed rigid nasal endoscopy.  I sprayed both nares with 2 sprays lidocaine and neosynephrine.     I began with the RIGHT side using a 0 degree rigid nasal endoscope, and then similarly examined the LEFT side     Findings: Septum is intact and grossly midline, postoperative edema is improving  Inferior turbinates: Right IT is reduced and lateralized.  Left IT is reduced and lateralized, no further synechiae.  Left MT is healing nicely, I did debride some normal postoperative secretions with #8 Beard  Right middle turbinate and middle meatus looks normal   The patient tolerated the procedure well         Assessment and Plan:       ICD-10-CM    1. S/P nasal septoplasty  Z98.890    2. Left maxillary sinusitis  J32.0 fluticasone (FLONASE) 50 MCG/ACT nasal spray   3. Nasal obstruction  J34.89 ipratropium (ATROVENT) 0.03 % nasal spray   4. Nasal congestion  R09.81 ipratropium (ATROVENT) 0.03 % nasal spray       Start flonase 2 sprays to each nostril daily  Use the ipratropium as needed for nasal drainage  Follow up as needed    Veronica Chou NP-C  [facility]

## 2022-07-26 NOTE — NURSING NOTE
"Chief Complaint   Patient presents with     Follow Up     S/P Septoplasty/ TR        Initial /82 (BP Location: Right arm, Patient Position: Sitting, Cuff Size: Adult Large)   Pulse 71   Temp 97.4  F (36.3  C) (Tympanic)   Ht 1.778 m (5' 10\")   Wt 97.5 kg (215 lb)   SpO2 95%   BMI 30.85 kg/m   Estimated body mass index is 30.85 kg/m  as calculated from the following:    Height as of this encounter: 1.778 m (5' 10\").    Weight as of this encounter: 97.5 kg (215 lb).  Medication Reconciliation: complete  Monique Rain LPN    "

## 2022-07-26 NOTE — PATIENT INSTRUCTIONS
Thank you for allowing Veronica Chou and our ENT team to participate in your care.  If your medications are too expensive, please give the nurse a call.  We can possibly change this medication.  If you have a scheduling or an appointment question please contact our Health Unit Coordinator at their direct line [phone].   ALL nursing questions or concerns can be directed to your ENT nurse at: [phone] - [facility]     Start flonase 2 sprays to each nostril daily  Use the ipratropium as needed for nasal drainage  Follow up as needed

## 2022-08-03 ENCOUNTER — OFFICE VISIT (OUTPATIENT)
Dept: CHIROPRACTIC MEDICINE | Facility: OTHER | Age: 62
End: 2022-08-03
Attending: CHIROPRACTOR
Payer: COMMERCIAL

## 2022-08-03 DIAGNOSIS — M99.02 SEGMENTAL AND SOMATIC DYSFUNCTION OF THORACIC REGION: Primary | ICD-10-CM

## 2022-08-03 DIAGNOSIS — M99.01 SEGMENTAL AND SOMATIC DYSFUNCTION OF CERVICAL REGION: ICD-10-CM

## 2022-08-03 DIAGNOSIS — M54.2 CERVICALGIA: ICD-10-CM

## 2022-08-03 PROCEDURE — 98941 CHIROPRACT MANJ 3-4 REGIONS: CPT | Mod: AT | Performed by: CHIROPRACTOR

## 2022-08-08 ENCOUNTER — OFFICE VISIT (OUTPATIENT)
Dept: CHIROPRACTIC MEDICINE | Facility: OTHER | Age: 62
End: 2022-08-08
Attending: CHIROPRACTOR
Payer: COMMERCIAL

## 2022-08-08 DIAGNOSIS — M54.2 CERVICALGIA: ICD-10-CM

## 2022-08-08 DIAGNOSIS — M99.01 SEGMENTAL AND SOMATIC DYSFUNCTION OF CERVICAL REGION: Primary | ICD-10-CM

## 2022-08-08 DIAGNOSIS — M99.02 SEGMENTAL AND SOMATIC DYSFUNCTION OF THORACIC REGION: ICD-10-CM

## 2022-08-08 PROCEDURE — 98941 CHIROPRACT MANJ 3-4 REGIONS: CPT | Mod: AT | Performed by: CHIROPRACTOR

## 2022-08-11 ENCOUNTER — OFFICE VISIT (OUTPATIENT)
Dept: CHIROPRACTIC MEDICINE | Facility: OTHER | Age: 62
End: 2022-08-11
Attending: CHIROPRACTOR
Payer: COMMERCIAL

## 2022-08-11 DIAGNOSIS — M99.02 SEGMENTAL AND SOMATIC DYSFUNCTION OF THORACIC REGION: ICD-10-CM

## 2022-08-11 DIAGNOSIS — M54.2 CERVICALGIA: ICD-10-CM

## 2022-08-11 DIAGNOSIS — M99.01 SEGMENTAL AND SOMATIC DYSFUNCTION OF CERVICAL REGION: Primary | ICD-10-CM

## 2022-08-11 PROCEDURE — 98941 CHIROPRACT MANJ 3-4 REGIONS: CPT | Mod: AT | Performed by: CHIROPRACTOR

## 2022-08-15 ENCOUNTER — OFFICE VISIT (OUTPATIENT)
Dept: CHIROPRACTIC MEDICINE | Facility: OTHER | Age: 62
End: 2022-08-15
Attending: CHIROPRACTOR
Payer: COMMERCIAL

## 2022-08-15 DIAGNOSIS — M99.02 SEGMENTAL AND SOMATIC DYSFUNCTION OF THORACIC REGION: ICD-10-CM

## 2022-08-15 DIAGNOSIS — M54.2 CERVICALGIA: ICD-10-CM

## 2022-08-15 DIAGNOSIS — M99.01 SEGMENTAL AND SOMATIC DYSFUNCTION OF CERVICAL REGION: Primary | ICD-10-CM

## 2022-08-15 PROCEDURE — 98941 CHIROPRACT MANJ 3-4 REGIONS: CPT | Mod: AT | Performed by: CHIROPRACTOR

## 2022-08-15 NOTE — PROGRESS NOTES
Subjective Finding:    Chief compalint: Patient presents with:  Back Pain  , Pain Scale: 7/10, Intensity: sharp, Duration: 2 months, Radiating: no.    Date of injury:     Activities that the pain restricts:   Home/household/hobbies/social activities: yes.  Work duties: yes.  Sleep: yes.  Makes symptoms better: rest.  Makes symptoms worse: cervical extension and cervical flexion.  Have you seen anyone else for the symptoms? No.  Work related: no.  Automobile related injury: no.    Objective and Assessment:    Posture Analysis:   High shoulder: .  Head tilt: .  High iliac crest: .  Head carriage: forward.  Thoracic Kyphosis: neutral.  Lumbar Lordosis: neutral.    Lumbar Range of Motion: .  Cervical Range of Motion: extension decreased, left lateral flexion decreased and right lateral flexion decreased.  Thoracic Range of Motion: .  Extremity Range of Motion: .    Palpation:   Traps: sharp pain, referred pain: no    Segmental dysfunction pre-treatment and treatment area: C2, C5, C6 and T2.   L5  SI right    Assessment post-treatment:  Cervical: ROM increased.  Thoracic: ROM increased.  Lumbar: .    Comments: .      Complicating Factors: .    Procedure(s):  CMT:  66736 Chiropractic manipulative treatment 1-2 regions performed   Cervical: Diversified, See above for level, Supine and Thoracic: Diversified, See above for level, Prone    Modalities:  None performed this visit    Therapeutic procedures:  None    Plan:  Treatment plan: PRN.  Instructed patient: stretch as instructed at visit.  Short term goals: reduce pain.  Long term goals: restore normal function.  Prognosis: good.

## 2022-08-17 DIAGNOSIS — M25.50 PAIN IN JOINT, MULTIPLE SITES: ICD-10-CM

## 2022-08-17 DIAGNOSIS — J98.01 ACUTE BRONCHOSPASM: ICD-10-CM

## 2022-08-18 ENCOUNTER — OFFICE VISIT (OUTPATIENT)
Dept: CHIROPRACTIC MEDICINE | Facility: OTHER | Age: 62
End: 2022-08-18
Attending: CHIROPRACTOR
Payer: COMMERCIAL

## 2022-08-18 DIAGNOSIS — M99.01 SEGMENTAL AND SOMATIC DYSFUNCTION OF CERVICAL REGION: Primary | ICD-10-CM

## 2022-08-18 DIAGNOSIS — M54.2 CERVICALGIA: ICD-10-CM

## 2022-08-18 DIAGNOSIS — M99.02 SEGMENTAL AND SOMATIC DYSFUNCTION OF THORACIC REGION: ICD-10-CM

## 2022-08-18 PROCEDURE — 98941 CHIROPRACT MANJ 3-4 REGIONS: CPT | Mod: AT | Performed by: CHIROPRACTOR

## 2022-08-19 RX ORDER — ALBUTEROL SULFATE 90 UG/1
AEROSOL, METERED RESPIRATORY (INHALATION)
Qty: 18 G | Refills: 0 | Status: SHIPPED | OUTPATIENT
Start: 2022-08-19 | End: 2022-10-03

## 2022-08-19 NOTE — TELEPHONE ENCOUNTER
Voltaren  Last Written Prescription Date: 6/6/22  Last Fill Quantity: 100g # of Refills: 0  Last Office Visit: 10/5/21    Ventolin  Last Written Prescription Date: 2/7/22  Last Fill Quantity: 18g # of Refills: 3  Last Office Visit: 10/5/21

## 2022-08-22 ENCOUNTER — OFFICE VISIT (OUTPATIENT)
Dept: CHIROPRACTIC MEDICINE | Facility: OTHER | Age: 62
End: 2022-08-22
Attending: CHIROPRACTOR
Payer: COMMERCIAL

## 2022-08-22 DIAGNOSIS — M99.01 SEGMENTAL AND SOMATIC DYSFUNCTION OF CERVICAL REGION: Primary | ICD-10-CM

## 2022-08-22 DIAGNOSIS — M99.02 SEGMENTAL AND SOMATIC DYSFUNCTION OF THORACIC REGION: ICD-10-CM

## 2022-08-22 DIAGNOSIS — M54.2 CERVICALGIA: ICD-10-CM

## 2022-08-22 PROCEDURE — 98941 CHIROPRACT MANJ 3-4 REGIONS: CPT | Mod: AT | Performed by: CHIROPRACTOR

## 2022-08-22 NOTE — PROGRESS NOTES
Subjective Finding:    Chief compalint: Patient presents with:  Back Pain  , Pain Scale: 7/10, Intensity: sharp, Duration: 2 months, Radiating: no.    Date of injury:     Activities that the pain restricts:   Home/household/hobbies/social activities: yes.  Work duties: yes.  Sleep: yes.  Makes symptoms better: rest.  Makes symptoms worse: cervical extension and cervical flexion.  Have you seen anyone else for the symptoms? No.  Work related: no.  Automobile related injury: no.    Objective and Assessment:    Posture Analysis:   High shoulder: .  Head tilt: .  High iliac crest: .  Head carriage: forward.  Thoracic Kyphosis: neutral.  Lumbar Lordosis: neutral.    Lumbar Range of Motion: .  Cervical Range of Motion: extension decreased, left lateral flexion decreased and right lateral flexion decreased.  Thoracic Range of Motion: .  Extremity Range of Motion: .    Palpation:   Traps: sharp pain, referred pain: no    Segmental dysfunction pre-treatment and treatment area: C2, C5, C6 and T2.   L5  SI right    Assessment post-treatment:  Cervical: ROM increased.  Thoracic: ROM increased.  Lumbar: .    Comments: .      Complicating Factors: .    Procedure(s):  CMT:  68227 Chiropractic manipulative treatment 1-2 regions performed   Cervical: Diversified, See above for level, Supine and Thoracic: Diversified, See above for level, Prone    Modalities:  None performed this visit    Therapeutic procedures:  None    Plan:  Treatment plan: PRN.  Instructed patient: stretch as instructed at visit.  Short term goals: reduce pain.  Long term goals: restore normal function.  Prognosis: good.

## 2022-08-24 NOTE — PROGRESS NOTES
Subjective Finding:    Chief compalint: Patient presents with:  Neck Pain  , Pain Scale: 7/10, Intensity: sharp, Duration: 2 months, Radiating: no.    Date of injury:     Activities that the pain restricts:   Home/household/hobbies/social activities: yes.  Work duties: yes.  Sleep: yes.  Makes symptoms better: rest.  Makes symptoms worse: cervical extension and cervical flexion.  Have you seen anyone else for the symptoms? No.  Work related: no.  Automobile related injury: no.    Objective and Assessment:    Posture Analysis:   High shoulder: .  Head tilt: .  High iliac crest: .  Head carriage: forward.  Thoracic Kyphosis: neutral.  Lumbar Lordosis: neutral.    Lumbar Range of Motion: .  Cervical Range of Motion: extension decreased, left lateral flexion decreased and right lateral flexion decreased.  Thoracic Range of Motion: .  Extremity Range of Motion: .    Palpation:   Traps: sharp pain, referred pain: no    Segmental dysfunction pre-treatment and treatment area: C2, C5, C6 and T2.   L5  SI right    Assessment post-treatment:  Cervical: ROM increased.  Thoracic: ROM increased.  Lumbar: .    Comments: .      Complicating Factors: .    Procedure(s):  CMT:  20732 Chiropractic manipulative treatment 1-2 regions performed   Cervical: Diversified, See above for level, Supine and Thoracic: Diversified, See above for level, Prone    Modalities:  None performed this visit    Therapeutic procedures:  None    Plan:  Treatment plan: PRN.  Instructed patient: stretch as instructed at visit.  Short term goals: reduce pain.  Long term goals: restore normal function.  Prognosis: good.

## 2022-08-25 ENCOUNTER — OFFICE VISIT (OUTPATIENT)
Dept: CHIROPRACTIC MEDICINE | Facility: OTHER | Age: 62
End: 2022-08-25
Attending: CHIROPRACTOR
Payer: COMMERCIAL

## 2022-08-25 DIAGNOSIS — M99.01 SEGMENTAL AND SOMATIC DYSFUNCTION OF CERVICAL REGION: Primary | ICD-10-CM

## 2022-08-25 DIAGNOSIS — M99.02 SEGMENTAL AND SOMATIC DYSFUNCTION OF THORACIC REGION: ICD-10-CM

## 2022-08-25 DIAGNOSIS — M54.2 CERVICALGIA: ICD-10-CM

## 2022-08-25 PROCEDURE — 98941 CHIROPRACT MANJ 3-4 REGIONS: CPT | Mod: AT | Performed by: CHIROPRACTOR

## 2022-08-25 NOTE — PROGRESS NOTES
Subjective Finding:    Chief compalint: Patient presents with:  Neck Pain  , Pain Scale: 7/10, Intensity: sharp, Duration: 2 months, Radiating: no.    Date of injury:     Activities that the pain restricts:   Home/household/hobbies/social activities: yes.  Work duties: yes.  Sleep: yes.  Makes symptoms better: rest.  Makes symptoms worse: cervical extension and cervical flexion.  Have you seen anyone else for the symptoms? No.  Work related: no.  Automobile related injury: no.    Objective and Assessment:    Posture Analysis:   High shoulder: .  Head tilt: .  High iliac crest: .  Head carriage: forward.  Thoracic Kyphosis: neutral.  Lumbar Lordosis: neutral.    Lumbar Range of Motion: .  Cervical Range of Motion: extension decreased, left lateral flexion decreased and right lateral flexion decreased.  Thoracic Range of Motion: .  Extremity Range of Motion: .    Palpation:   Traps: sharp pain, referred pain: no    Segmental dysfunction pre-treatment and treatment area: C2, C5, C6 and T2.   L5  SI right    Assessment post-treatment:  Cervical: ROM increased.  Thoracic: ROM increased.  Lumbar: .    Comments: .      Complicating Factors: .    Procedure(s):  CMT:  63376 Chiropractic manipulative treatment 1-2 regions performed   Cervical: Diversified, See above for level, Supine and Thoracic: Diversified, See above for level, Prone    Modalities:  None performed this visit    Therapeutic procedures:  None    Plan:  Treatment plan: PRN.  Instructed patient: stretch as instructed at visit.  Short term goals: reduce pain.  Long term goals: restore normal function.  Prognosis: good.

## 2022-08-29 ENCOUNTER — OFFICE VISIT (OUTPATIENT)
Dept: CHIROPRACTIC MEDICINE | Facility: OTHER | Age: 62
End: 2022-08-29
Attending: CHIROPRACTOR
Payer: COMMERCIAL

## 2022-08-29 DIAGNOSIS — M99.01 SEGMENTAL AND SOMATIC DYSFUNCTION OF CERVICAL REGION: Primary | ICD-10-CM

## 2022-08-29 DIAGNOSIS — M54.2 CERVICALGIA: ICD-10-CM

## 2022-08-29 DIAGNOSIS — M99.02 SEGMENTAL AND SOMATIC DYSFUNCTION OF THORACIC REGION: ICD-10-CM

## 2022-08-29 PROCEDURE — 98940 CHIROPRACT MANJ 1-2 REGIONS: CPT | Mod: AT | Performed by: CHIROPRACTOR

## 2022-08-30 NOTE — PROGRESS NOTES
Subjective Finding:    Chief compalint: Patient presents with:  Neck Pain  , Pain Scale: 7/10, Intensity: sharp, Duration: 2 months, Radiating: no.    Date of injury:     Activities that the pain restricts:   Home/household/hobbies/social activities: yes.  Work duties: yes.  Sleep: yes.  Makes symptoms better: rest.  Makes symptoms worse: cervical extension and cervical flexion.  Have you seen anyone else for the symptoms? No.  Work related: no.  Automobile related injury: no.    Objective and Assessment:    Posture Analysis:   High shoulder: .  Head tilt: .  High iliac crest: .  Head carriage: forward.  Thoracic Kyphosis: neutral.  Lumbar Lordosis: neutral.    Lumbar Range of Motion: .  Cervical Range of Motion: extension decreased, left lateral flexion decreased and right lateral flexion decreased.  Thoracic Range of Motion: .  Extremity Range of Motion: .    Palpation:   Traps: sharp pain, referred pain: no    Segmental dysfunction pre-treatment and treatment area: C2, C5, C6 and T2.   L5  SI right    Assessment post-treatment:  Cervical: ROM increased.  Thoracic: ROM increased.  Lumbar: .    Comments: .      Complicating Factors: .    Procedure(s):  CMT:  14086 Chiropractic manipulative treatment 1-2 regions performed   Cervical: Diversified, See above for level, Supine and Thoracic: Diversified, See above for level, Prone    Modalities:  None performed this visit    Therapeutic procedures:  None    Plan:  Treatment plan: PRN.  Instructed patient: stretch as instructed at visit.  Short term goals: reduce pain.  Long term goals: restore normal function.  Prognosis: good.

## 2022-08-30 NOTE — PROGRESS NOTES
Subjective Finding:    Chief compalint: Patient presents with:  Neck Pain  , Pain Scale: 7/10, Intensity: sharp, Duration: 2 months, Radiating: no.    Date of injury:     Activities that the pain restricts:   Home/household/hobbies/social activities: yes.  Work duties: yes.  Sleep: yes.  Makes symptoms better: rest.  Makes symptoms worse: cervical extension and cervical flexion.  Have you seen anyone else for the symptoms? No.  Work related: no.  Automobile related injury: no.    Objective and Assessment:    Posture Analysis:   High shoulder: .  Head tilt: .  High iliac crest: .  Head carriage: forward.  Thoracic Kyphosis: neutral.  Lumbar Lordosis: neutral.    Lumbar Range of Motion: .  Cervical Range of Motion: extension decreased, left lateral flexion decreased and right lateral flexion decreased.  Thoracic Range of Motion: .  Extremity Range of Motion: .    Palpation:   Traps: sharp pain, referred pain: no    Segmental dysfunction pre-treatment and treatment area: C2, C5, C6 and T2.   L5  SI right    Assessment post-treatment:  Cervical: ROM increased.  Thoracic: ROM increased.  Lumbar: .    Comments: .      Complicating Factors: .    Procedure(s):  CMT:  04495 Chiropractic manipulative treatment 1-2 regions performed   Cervical: Diversified, See above for level, Supine and Thoracic: Diversified, See above for level, Prone    Modalities:  None performed this visit    Therapeutic procedures:  None    Plan:  Treatment plan: PRN.  Instructed patient: stretch as instructed at visit.  Short term goals: reduce pain.  Long term goals: restore normal function.  Prognosis: good.

## 2022-08-31 NOTE — PROGRESS NOTES
Subjective Finding:    Chief compalint: Patient presents with:  Neck Pain  , Pain Scale: 7/10, Intensity: sharp, Duration: 2 months, Radiating: no.    Date of injury:     Activities that the pain restricts:   Home/household/hobbies/social activities: yes.  Work duties: yes.  Sleep: yes.  Makes symptoms better: rest.  Makes symptoms worse: cervical extension and cervical flexion.  Have you seen anyone else for the symptoms? No.  Work related: no.  Automobile related injury: no.    Objective and Assessment:    Posture Analysis:   High shoulder: .  Head tilt: .  High iliac crest: .  Head carriage: forward.  Thoracic Kyphosis: neutral.  Lumbar Lordosis: neutral.    Lumbar Range of Motion: .  Cervical Range of Motion: extension decreased, left lateral flexion decreased and right lateral flexion decreased.  Thoracic Range of Motion: .  Extremity Range of Motion: .    Palpation:   Traps: sharp pain, referred pain: no    Segmental dysfunction pre-treatment and treatment area: C2, C5, C6 and T2.   L5  SI right    Assessment post-treatment:  Cervical: ROM increased.  Thoracic: ROM increased.  Lumbar: .    Comments: .      Complicating Factors: .    Procedure(s):  CMT:  52668 Chiropractic manipulative treatment 1-2 regions performed   Cervical: Diversified, See above for level, Supine and Thoracic: Diversified, See above for level, Prone    Modalities:  None performed this visit    Therapeutic procedures:  None    Plan:  Treatment plan: PRN.  Instructed patient: stretch as instructed at visit.  Short term goals: reduce pain.  Long term goals: restore normal function.  Prognosis: good.

## 2022-08-31 NOTE — PROGRESS NOTES
Subjective Finding:    Chief compalint: Patient presents with:  Neck Pain  , Pain Scale: 7/10, Intensity: sharp, Duration: 2 months, Radiating: no.    Date of injury:     Activities that the pain restricts:   Home/household/hobbies/social activities: yes.  Work duties: yes.  Sleep: yes.  Makes symptoms better: rest.  Makes symptoms worse: cervical extension and cervical flexion.  Have you seen anyone else for the symptoms? No.  Work related: no.  Automobile related injury: no.    Objective and Assessment:    Posture Analysis:   High shoulder: .  Head tilt: .  High iliac crest: .  Head carriage: forward.  Thoracic Kyphosis: neutral.  Lumbar Lordosis: neutral.    Lumbar Range of Motion: .  Cervical Range of Motion: extension decreased, left lateral flexion decreased and right lateral flexion decreased.  Thoracic Range of Motion: .  Extremity Range of Motion: .    Palpation:   Traps: sharp pain, referred pain: no    Segmental dysfunction pre-treatment and treatment area: C2, C5, C6 and T2.   L5  SI right    Assessment post-treatment:  Cervical: ROM increased.  Thoracic: ROM increased.  Lumbar: .    Comments: .      Complicating Factors: .    Procedure(s):  CMT:  56788 Chiropractic manipulative treatment 1-2 regions performed   Cervical: Diversified, See above for level, Supine and Thoracic: Diversified, See above for level, Prone    Modalities:  None performed this visit    Therapeutic procedures:  None    Plan:  Treatment plan: PRN.  Instructed patient: stretch as instructed at visit.  Short term goals: reduce pain.  Long term goals: restore normal function.  Prognosis: good.

## 2022-09-01 ENCOUNTER — OFFICE VISIT (OUTPATIENT)
Dept: CHIROPRACTIC MEDICINE | Facility: OTHER | Age: 62
End: 2022-09-01
Attending: CHIROPRACTOR
Payer: COMMERCIAL

## 2022-09-01 DIAGNOSIS — M99.02 SEGMENTAL AND SOMATIC DYSFUNCTION OF THORACIC REGION: ICD-10-CM

## 2022-09-01 DIAGNOSIS — M54.2 CERVICALGIA: ICD-10-CM

## 2022-09-01 DIAGNOSIS — M99.01 SEGMENTAL AND SOMATIC DYSFUNCTION OF CERVICAL REGION: Primary | ICD-10-CM

## 2022-09-01 PROCEDURE — 98941 CHIROPRACT MANJ 3-4 REGIONS: CPT | Mod: AT | Performed by: CHIROPRACTOR

## 2022-09-01 NOTE — PROGRESS NOTES
Subjective Finding:    Chief compalint: Patient presents with:  Back Pain  , Pain Scale: 7/10, Intensity: sharp, Duration: 2 months, Radiating: no.    Date of injury:     Activities that the pain restricts:   Home/household/hobbies/social activities: yes.  Work duties: yes.  Sleep: yes.  Makes symptoms better: rest.  Makes symptoms worse: cervical extension and cervical flexion.  Have you seen anyone else for the symptoms? No.  Work related: no.  Automobile related injury: no.    Objective and Assessment:    Posture Analysis:   High shoulder: .  Head tilt: .  High iliac crest: .  Head carriage: forward.  Thoracic Kyphosis: neutral.  Lumbar Lordosis: neutral.    Lumbar Range of Motion: .  Cervical Range of Motion: extension decreased, left lateral flexion decreased and right lateral flexion decreased.  Thoracic Range of Motion: .  Extremity Range of Motion: .    Palpation:   Traps: sharp pain, referred pain: no    Segmental dysfunction pre-treatment and treatment area: C2, C5, C6 and T2.   L5  SI right    Assessment post-treatment:  Cervical: ROM increased.  Thoracic: ROM increased.  Lumbar: .    Comments: .      Complicating Factors: .    Procedure(s):  CMT:  29113 Chiropractic manipulative treatment 1-2 regions performed   Cervical: Diversified, See above for level, Supine and Thoracic: Diversified, See above for level, Prone    Modalities:  None performed this visit    Therapeutic procedures:  None    Plan:  Treatment plan: PRN.  Instructed patient: stretch as instructed at visit.  Short term goals: reduce pain.  Long term goals: restore normal function.  Prognosis: good.

## 2022-09-06 ENCOUNTER — OFFICE VISIT (OUTPATIENT)
Dept: CHIROPRACTIC MEDICINE | Facility: OTHER | Age: 62
End: 2022-09-06
Attending: CHIROPRACTOR
Payer: COMMERCIAL

## 2022-09-06 DIAGNOSIS — M99.01 SEGMENTAL AND SOMATIC DYSFUNCTION OF CERVICAL REGION: Primary | ICD-10-CM

## 2022-09-06 DIAGNOSIS — M54.2 CERVICALGIA: ICD-10-CM

## 2022-09-06 DIAGNOSIS — M99.02 SEGMENTAL AND SOMATIC DYSFUNCTION OF THORACIC REGION: ICD-10-CM

## 2022-09-06 PROCEDURE — 98941 CHIROPRACT MANJ 3-4 REGIONS: CPT | Mod: AT | Performed by: CHIROPRACTOR

## 2022-09-06 NOTE — PROGRESS NOTES
Subjective Finding:    Chief compalint: Patient presents with:  Neck Pain  , Pain Scale: 7/10, Intensity: sharp, Duration: 2 months, Radiating: no.    Date of injury:     Activities that the pain restricts:   Home/household/hobbies/social activities: yes.  Work duties: yes.  Sleep: yes.  Makes symptoms better: rest.  Makes symptoms worse: cervical extension and cervical flexion.  Have you seen anyone else for the symptoms? No.  Work related: no.  Automobile related injury: no.    Objective and Assessment:    Posture Analysis:   High shoulder: .  Head tilt: .  High iliac crest: .  Head carriage: forward.  Thoracic Kyphosis: neutral.  Lumbar Lordosis: neutral.    Lumbar Range of Motion: .  Cervical Range of Motion: extension decreased, left lateral flexion decreased and right lateral flexion decreased.  Thoracic Range of Motion: .  Extremity Range of Motion: .    Palpation:   Traps: sharp pain, referred pain: no    Segmental dysfunction pre-treatment and treatment area: C2, C5, C6 and T2.   L5  SI right    Assessment post-treatment:  Cervical: ROM increased.  Thoracic: ROM increased.  Lumbar: .    Comments: .      Complicating Factors: .    Procedure(s):  CMT:  27569 Chiropractic manipulative treatment 1-2 regions performed   Cervical: Diversified, See above for level, Supine and Thoracic: Diversified, See above for level, Prone    Modalities:  None performed this visit    Therapeutic procedures:  None    Plan:  Treatment plan: PRN.  Instructed patient: stretch as instructed at visit.  Short term goals: reduce pain.  Long term goals: restore normal function.  Prognosis: good.

## 2022-09-13 ENCOUNTER — OFFICE VISIT (OUTPATIENT)
Dept: CHIROPRACTIC MEDICINE | Facility: OTHER | Age: 62
End: 2022-09-13
Attending: CHIROPRACTOR
Payer: COMMERCIAL

## 2022-09-13 DIAGNOSIS — M99.02 SEGMENTAL AND SOMATIC DYSFUNCTION OF THORACIC REGION: ICD-10-CM

## 2022-09-13 DIAGNOSIS — M54.2 CERVICALGIA: ICD-10-CM

## 2022-09-13 DIAGNOSIS — M99.01 SEGMENTAL AND SOMATIC DYSFUNCTION OF CERVICAL REGION: Primary | ICD-10-CM

## 2022-09-13 PROCEDURE — 98940 CHIROPRACT MANJ 1-2 REGIONS: CPT | Mod: AT | Performed by: CHIROPRACTOR

## 2022-09-13 NOTE — PROGRESS NOTES
Subjective Finding:    Chief compalint: Patient presents with:  Neck Pain  , Pain Scale: 7/10, Intensity: sharp, Duration: 2 months, Radiating: no.    Date of injury:     Activities that the pain restricts:   Home/household/hobbies/social activities: yes.  Work duties: yes.  Sleep: yes.  Makes symptoms better: rest.  Makes symptoms worse: cervical extension and cervical flexion.  Have you seen anyone else for the symptoms? No.  Work related: no.  Automobile related injury: no.    Objective and Assessment:    Posture Analysis:   High shoulder: .  Head tilt: .  High iliac crest: .  Head carriage: forward.  Thoracic Kyphosis: neutral.  Lumbar Lordosis: neutral.    Lumbar Range of Motion: .  Cervical Range of Motion: extension decreased, left lateral flexion decreased and right lateral flexion decreased.  Thoracic Range of Motion: .  Extremity Range of Motion: .    Palpation:   Traps: sharp pain, referred pain: no    Segmental dysfunction pre-treatment and treatment area: C2, C5, C6 and T2.   L5  SI right    Assessment post-treatment:  Cervical: ROM increased.  Thoracic: ROM increased.  Lumbar: .    Comments: .      Complicating Factors: .    Procedure(s):  CMT:  81006 Chiropractic manipulative treatment 1-2 regions performed   Cervical: Diversified, See above for level, Supine and Thoracic: Diversified, See above for level, Prone    Modalities:  None performed this visit    Therapeutic procedures:  None    Plan:  Treatment plan: PRN.  Instructed patient: stretch as instructed at visit.  Short term goals: reduce pain.  Long term goals: restore normal function.  Prognosis: good.

## 2022-10-03 DIAGNOSIS — J98.01 ACUTE BRONCHOSPASM: ICD-10-CM

## 2022-10-03 RX ORDER — ALBUTEROL SULFATE 90 UG/1
AEROSOL, METERED RESPIRATORY (INHALATION)
Qty: 18 G | Refills: 0 | Status: SHIPPED | OUTPATIENT
Start: 2022-10-03 | End: 2022-11-16

## 2022-10-04 ENCOUNTER — TRANSFERRED RECORDS (OUTPATIENT)
Dept: HEALTH INFORMATION MANAGEMENT | Facility: CLINIC | Age: 62
End: 2022-10-04

## 2022-11-15 DIAGNOSIS — J98.01 ACUTE BRONCHOSPASM: ICD-10-CM

## 2022-11-16 RX ORDER — ALBUTEROL SULFATE 90 UG/1
AEROSOL, METERED RESPIRATORY (INHALATION)
Qty: 18 G | Refills: 3 | Status: SHIPPED | OUTPATIENT
Start: 2022-11-16 | End: 2023-05-02

## 2022-11-22 ENCOUNTER — ALLIED HEALTH/NURSE VISIT (OUTPATIENT)
Dept: FAMILY MEDICINE | Facility: OTHER | Age: 62
End: 2022-11-22
Attending: FAMILY MEDICINE
Payer: COMMERCIAL

## 2022-11-22 DIAGNOSIS — Z23 NEED FOR PROPHYLACTIC VACCINATION AND INOCULATION AGAINST INFLUENZA: Primary | ICD-10-CM

## 2022-11-22 PROCEDURE — G0008 ADMIN INFLUENZA VIRUS VAC: HCPCS

## 2023-05-02 DIAGNOSIS — J98.01 ACUTE BRONCHOSPASM: ICD-10-CM

## 2023-05-02 RX ORDER — ALBUTEROL SULFATE 90 UG/1
AEROSOL, METERED RESPIRATORY (INHALATION)
Qty: 18 G | Refills: 0 | Status: SHIPPED | OUTPATIENT
Start: 2023-05-02 | End: 2023-06-13

## 2023-05-02 NOTE — TELEPHONE ENCOUNTER
VENTOLIN  (90 Base) MCG/ACT inhaler  Last Written Prescription Date:  11-16-22  Last Fill Quantity: 18G,   # refills: 3  Last Office Visit: 7-26-22  Future Office visit:       Routing refill request to provider for review/approval because:  Drug not on the [facility], [facility] or [facility] refill protocol or controlled substance

## 2023-05-25 DIAGNOSIS — Z87.09 HISTORY OF ACUTE SINUSITIS: ICD-10-CM

## 2023-05-25 DIAGNOSIS — H93.8X2 PLUGGED FEELING IN EAR, LEFT: ICD-10-CM

## 2023-05-25 DIAGNOSIS — R09.81 NASAL CONGESTION: ICD-10-CM

## 2023-05-30 RX ORDER — CETIRIZINE HYDROCHLORIDE 10 MG/1
TABLET ORAL
Qty: 30 TABLET | Refills: 0 | Status: SHIPPED | OUTPATIENT
Start: 2023-05-30 | End: 2023-06-22

## 2023-05-30 NOTE — TELEPHONE ENCOUNTER
cetirizine (ZYRTEC) 10 MG tablet 90 tablet 3 10/18/2021     Last Office Visit: 7-26-22    Future Office visit:       Routing refill request to provider for review/approval because:

## 2023-06-02 ENCOUNTER — HEALTH MAINTENANCE LETTER (OUTPATIENT)
Age: 63
End: 2023-06-02

## 2023-06-12 ENCOUNTER — TRANSFERRED RECORDS (OUTPATIENT)
Dept: HEALTH INFORMATION MANAGEMENT | Facility: CLINIC | Age: 63
End: 2023-06-12

## 2023-06-12 DIAGNOSIS — J98.01 ACUTE BRONCHOSPASM: ICD-10-CM

## 2023-06-13 RX ORDER — ALBUTEROL SULFATE 90 UG/1
AEROSOL, METERED RESPIRATORY (INHALATION)
Qty: 18 G | Refills: 0 | Status: SHIPPED | OUTPATIENT
Start: 2023-06-13 | End: 2023-06-29

## 2023-06-13 NOTE — TELEPHONE ENCOUNTER
VENTOLIN  (90 Base) MCG/ACT inhaler 18 g 0 5/2/2023     Last Office Visit: 7-26-22  Future Office visit:       Routing refill request to provider for review/approval because:

## 2023-06-16 NOTE — TELEPHONE ENCOUNTER
Per SW and MD, patient ok to leave AMA with parent. RN discussed importance of staying and speaking with SW and parent still insistent on taking patient home.  AMA papers signed and in paper chart     Barbara Martinez RN  06/16/23 0000 Pt called and is wondering if he can stop taking the doxycycline hyclate.  He said that when he took it within and hour or so his right side plugged up and he experienced pain in his neck he couldn't move it.  When he flushed it out it with the rinses, he said all the pain and his neck was gone and he was no longer plugged.  Pt said the rinses are helping a lot.  Please advise.

## 2023-06-19 DIAGNOSIS — Z87.09 HISTORY OF ACUTE SINUSITIS: ICD-10-CM

## 2023-06-19 DIAGNOSIS — R09.81 NASAL CONGESTION: ICD-10-CM

## 2023-06-19 DIAGNOSIS — H93.8X2 PLUGGED FEELING IN EAR, LEFT: ICD-10-CM

## 2023-06-22 ENCOUNTER — TELEPHONE (OUTPATIENT)
Dept: CARE COORDINATION | Facility: OTHER | Age: 63
End: 2023-06-22

## 2023-06-22 RX ORDER — CETIRIZINE HYDROCHLORIDE 10 MG/1
TABLET ORAL
Qty: 30 TABLET | Refills: 1 | Status: SHIPPED | OUTPATIENT
Start: 2023-06-22

## 2023-06-22 NOTE — TELEPHONE ENCOUNTER
cetirizine (ZYRTEC) 10 MG tablet      Last Written Prescription Date:  6/22/2023  Last Fill Quantity: 30,   # refills: 1  Last Office Visit: 8/4/2020  Future Office visit:       Routing refill request to provider for review/approval because:    Kimberly Boecker, RN

## 2023-06-29 DIAGNOSIS — J98.01 ACUTE BRONCHOSPASM: ICD-10-CM

## 2023-06-29 RX ORDER — ALBUTEROL SULFATE 90 UG/1
AEROSOL, METERED RESPIRATORY (INHALATION)
Qty: 18 G | Refills: 0 | Status: SHIPPED | OUTPATIENT
Start: 2023-06-29 | End: 2023-07-20

## 2023-06-29 NOTE — TELEPHONE ENCOUNTER
Ventolin      Last Written Prescription Date:  6/13/23  Last Fill Quantity: 18,   # refills: 0  Last Office Visit: 10/5/21  Future Office visit:

## 2023-07-12 ENCOUNTER — OFFICE VISIT (OUTPATIENT)
Dept: FAMILY MEDICINE | Facility: OTHER | Age: 63
End: 2023-07-12
Attending: FAMILY MEDICINE
Payer: COMMERCIAL

## 2023-07-12 ENCOUNTER — TELEPHONE (OUTPATIENT)
Dept: FAMILY MEDICINE | Facility: OTHER | Age: 63
End: 2023-07-12

## 2023-07-12 VITALS
HEART RATE: 78 BPM | SYSTOLIC BLOOD PRESSURE: 138 MMHG | WEIGHT: 222 LBS | TEMPERATURE: 97.6 F | OXYGEN SATURATION: 96 % | DIASTOLIC BLOOD PRESSURE: 76 MMHG | BODY MASS INDEX: 31.85 KG/M2

## 2023-07-12 DIAGNOSIS — S80.862A TICK BITE OF LEFT LOWER LEG, INITIAL ENCOUNTER: Primary | ICD-10-CM
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
## 2023-07-12 PROCEDURE — G0463 HOSPITAL OUTPT CLINIC VISIT: HCPCS

## 2023-07-12 PROCEDURE — 99213 OFFICE O/P EST LOW 20 MIN: CPT | Performed by: FAMILY MEDICINE

## 2023-07-12 RX ORDER — DOXYCYCLINE 100 MG/1
100 CAPSULE ORAL 2 TIMES DAILY
Qty: 209 CAPSULE | Refills: 0 | Status: SHIPPED | OUTPATIENT
Start: 2023-07-12 | End: 2024-01-22

## 2023-07-12 ASSESSMENT — PAIN SCALES - GENERAL: PAINLEVEL: NO PAIN (0)

## 2023-07-12 NOTE — TELEPHONE ENCOUNTER
8:47 AM    Reason for Call: OVERBOOK    Patient is having the following symptoms: Tick bite / red ring  for  4-5 days    The patient is requesting an appointment for ASAP with Dr. Moody    Was an appointment offered for this call? No  If yes : Appointment type              Date    Preferred method for responding to this message: Telephone Call  What is your phone number ?  [phone]    If we cannot reach you directly, may we leave a detailed response at the number you provided? Yes    Can this message wait until your PCP/provider returns, if unavailable today? Not applicable,     Altagracia Palm

## 2023-07-12 NOTE — PROGRESS NOTES
"  Assessment & Plan     Tick bite of left lower leg, initial encounter  Reviewed.  Very clear ring around it.  Not sure it's an EM rash but not sure it's not.  Either way, doxy coverage.  Warned about sun exposure.  F/u with any concerns.    - doxycycline hyclate (VIBRAMYCIN) 100 MG capsule; Take 1 capsule (100 mg) by mouth 2 times daily             Nicotine/Tobacco Cessation:  He reports that he has been smoking cigarettes and cigars. He started smoking about 11 years ago. He has a 4.50 pack-year smoking history. He has never used smokeless tobacco.  Nicotine/Tobacco Cessation Plan:         BMI:   Estimated body mass index is 31.85 kg/m  as calculated from the following:    Height as of 7/26/22: 1.778 m (5' 10\").    Weight as of this encounter: 100.7 kg (222 lb).           No follow-ups on file.    Judah Moody MD  [facility]   Villa is a 63 year old, presenting for the following health issues:  Tick Bite         No data to display              HPI     Tick bite       Duration: 1 week     Description (location/character/radiation): back of left calf     Intensity:  mild    Accompanying signs and symptoms: redness, ring around bite     History (similar episodes/previous evaluation): None    Precipitating or alleviating factors: None    Therapies tried and outcome: triple abx cream              Review of Systems   Constitutional, HEENT, cardiovascular, pulmonary, gi and gu systems are negative, except as otherwise noted.  Hand oa is much better with Dr. Knutson's injections.        Objective    /76   Pulse 78   Temp 97.6  F (36.4  C) (Tympanic)   Wt 100.7 kg (222 lb)   SpO2 96%   BMI 31.85 kg/m    Body mass index is 31.85 kg/m .  Physical Exam   GENERAL: healthy, alert and no distress  NECK: no adenopathy, no asymmetry, masses, or scars and thyroid normal to palpation  RESP: lungs clear to auscultation - no rales, rhonchi or wheezes  CV: regular rate and rhythm, " normal S1 S2, no S3 or S4, no murmur, click or rub, no peripheral edema and peripheral pulses strong  ABDOMEN: soft, nontender, no hepatosplenomegaly, no masses and bowel sounds normal  MS: no gross musculoskeletal defects noted, no edema  SKIN: ring around tick bite site left calf.  The ring is about 2 cm is diameter.

## 2023-07-19 ENCOUNTER — TELEPHONE (OUTPATIENT)
Dept: FAMILY MEDICINE | Facility: OTHER | Age: 63
End: 2023-07-19

## 2023-07-19 DIAGNOSIS — J98.01 ACUTE BRONCHOSPASM: ICD-10-CM

## 2023-07-19 NOTE — TELEPHONE ENCOUNTER
Pt wanted to update you on the tick bite.  The bullseye is gone but now he has swelling in his knees to his ankles.

## 2023-07-19 NOTE — TELEPHONE ENCOUNTER
Recommend UC today with that new swelling.  I doubt it can be related to the bite but it might mean something else is wrong.  Thank.delano Moody MD

## 2023-07-20 RX ORDER — ALBUTEROL SULFATE 90 UG/1
AEROSOL, METERED RESPIRATORY (INHALATION)
Qty: 18 G | Refills: 0 | Status: SHIPPED | OUTPATIENT
Start: 2023-07-20 | End: 2023-08-28

## 2023-08-02 ENCOUNTER — TELEPHONE (OUTPATIENT)
Dept: FAMILY MEDICINE | Facility: OTHER | Age: 63
End: 2023-08-02
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
## 2023-08-02 DIAGNOSIS — S80.862A TICK BITE OF LEFT LOWER LEG, INITIAL ENCOUNTER: Primary | ICD-10-CM

## 2023-08-09 ENCOUNTER — LAB (OUTPATIENT)
Dept: LAB | Facility: OTHER | Age: 63
End: 2023-08-09
Payer: COMMERCIAL
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
## 2023-08-09 DIAGNOSIS — S80.862A TICK BITE OF LEFT LOWER LEG, INITIAL ENCOUNTER: ICD-10-CM

## 2023-08-09 PROCEDURE — 36415 COLL VENOUS BLD VENIPUNCTURE: CPT | Mod: ZL

## 2023-08-09 PROCEDURE — 86618 LYME DISEASE ANTIBODY: CPT | Mod: ZL

## 2023-08-10 LAB — B BURGDOR IGG+IGM SER QL: 0.18

## 2023-08-28 DIAGNOSIS — M25.50 PAIN IN JOINT, MULTIPLE SITES: ICD-10-CM

## 2023-08-28 DIAGNOSIS — J98.01 ACUTE BRONCHOSPASM: ICD-10-CM

## 2023-08-28 RX ORDER — DICLOFENAC SODIUM 10 MG/G
GEL TOPICAL
Qty: 100 G | Refills: 0 | Status: SHIPPED | OUTPATIENT
Start: 2023-08-28 | End: 2023-09-21

## 2023-08-28 RX ORDER — ALBUTEROL SULFATE 90 UG/1
AEROSOL, METERED RESPIRATORY (INHALATION)
Qty: 18 G | Refills: 0 | Status: SHIPPED | OUTPATIENT
Start: 2023-08-28 | End: 2023-09-28

## 2023-08-29 ENCOUNTER — TRANSFERRED RECORDS (OUTPATIENT)
Dept: HEALTH INFORMATION MANAGEMENT | Facility: HOSPITAL | Age: 63
End: 2023-08-29

## 2023-09-05 ENCOUNTER — TELEPHONE (OUTPATIENT)
Dept: FAMILY MEDICINE | Facility: OTHER | Age: 63
End: 2023-09-05

## 2023-09-05 NOTE — TELEPHONE ENCOUNTER
Pt states he saw Dr Knutson and he suggested started prednisone for joint pain.  Pt states that he told him to go through PCP for this so it can be followed closely.  Made pt appt on 9/19, pt wanted message sent to PCP also.

## 2023-09-05 NOTE — TELEPHONE ENCOUNTER
8:37 AM    Reason for Call: Phone Call    Description: pt didn't want to give much information/he wants to talk to nurse about his tick bite and arthritis    Was an appointment offered for this call? No  If yes : Appointment type              Date    Preferred method for responding to this message: Telephone Call  What is your phone number ?[phone]     If we cannot reach you directly, may we leave a detailed response at the number you provided? Yes    Can this message wait until your PCP/provider returns, if available today? Not applicable    Jil Suero

## 2023-09-20 DIAGNOSIS — M25.50 PAIN IN JOINT, MULTIPLE SITES: ICD-10-CM

## 2023-09-21 ENCOUNTER — TELEPHONE (OUTPATIENT)
Dept: FAMILY MEDICINE | Facility: OTHER | Age: 63
End: 2023-09-21

## 2023-09-21 NOTE — TELEPHONE ENCOUNTER
9:29 AM    Reason for Call: Phone Call    Description: Villa is hoping that Romina can change his prescription for his cream to 2-3 tubes at a time. He is going through 1 tube every 1 1/2 weeks.  Please call Villa to advise    Was an appointment offered for this call? No  If yes : Appointment type              Date    Preferred method for responding to this message: Telephone Call  What is your phone number ?  [phone]     If we cannot reach you directly, may we leave a detailed response at the number you provided? Yes    Can this message wait until your PCP/provider returns, if available today? Not applicable    Altagracia Palm

## 2023-09-26 ENCOUNTER — TRANSFERRED RECORDS (OUTPATIENT)
Dept: HEALTH INFORMATION MANAGEMENT | Facility: CLINIC | Age: 63
End: 2023-09-26

## 2023-09-26 DIAGNOSIS — J98.01 ACUTE BRONCHOSPASM: ICD-10-CM

## 2023-09-28 ENCOUNTER — TELEPHONE (OUTPATIENT)
Dept: FAMILY MEDICINE | Facility: OTHER | Age: 63
End: 2023-09-28

## 2023-09-28 RX ORDER — ALBUTEROL SULFATE 90 UG/1
AEROSOL, METERED RESPIRATORY (INHALATION)
Qty: 18 G | Refills: 3 | Status: SHIPPED | OUTPATIENT
Start: 2023-09-28 | End: 2024-01-19

## 2023-09-28 NOTE — TELEPHONE ENCOUNTER
3:10 PM    Reason for Call: Phone Call    Description: pt calling to check on his inhaler/said [facility] was going to send another fax    Was an appointment offered for this call? No  If yes : Appointment type              Date    Preferred method for responding to this message: Telephone Call  What is your phone number ?[phone]     If we cannot reach you directly, may we leave a detailed response at the number you provided? Yes    Can this message wait until your PCP/provider returns, if available today? Not applicable    Jil Suero

## 2023-10-12 ENCOUNTER — ALLIED HEALTH/NURSE VISIT (OUTPATIENT)
Dept: FAMILY MEDICINE | Facility: OTHER | Age: 63
End: 2023-10-12
Attending: FAMILY MEDICINE
Payer: COMMERCIAL

## 2023-10-12 DIAGNOSIS — Z23 NEED FOR PROPHYLACTIC VACCINATION AND INOCULATION AGAINST INFLUENZA: Primary | ICD-10-CM

## 2023-10-12 PROCEDURE — 90686 IIV4 VACC NO PRSV 0.5 ML IM: CPT

## 2024-01-10 ENCOUNTER — TELEPHONE (OUTPATIENT)
Dept: FAMILY MEDICINE | Facility: OTHER | Age: 64
End: 2024-01-10

## 2024-01-10 NOTE — TELEPHONE ENCOUNTER
12:23 PM    Reason for Call: Phone Call    Description: Villa would like a call from Dr. Moody or his nurse regarding his arthritis in his hands. He just has a couple of questions.     Was an appointment offered for this call? No  If yes : Appointment type              Date    Preferred method for responding to this message: Telephone Call  What is your phone number ?  [phone]     If we cannot reach you directly, may we leave a detailed response at the number you provided? No    Can this message wait until your PCP/provider returns, if available today? Not applicable    Altagracia Palm

## 2024-01-22 ENCOUNTER — VIRTUAL VISIT (OUTPATIENT)
Dept: FAMILY MEDICINE | Facility: OTHER | Age: 64
End: 2024-01-22
Attending: FAMILY MEDICINE
Payer: COMMERCIAL

## 2024-01-22 DIAGNOSIS — M25.50 POLYARTHRALGIA: Primary | ICD-10-CM

## 2024-01-22 PROCEDURE — 99441 PR PHYSICIAN TELEPHONE EVALUATION 5-10 MIN: CPT | Mod: 93 | Performed by: FAMILY MEDICINE

## 2024-01-22 RX ORDER — PREDNISONE 20 MG/1
20 TABLET ORAL 2 TIMES DAILY
Qty: 10 TABLET | Refills: 0 | Status: SHIPPED | OUTPATIENT
Start: 2024-01-22 | End: 2024-01-27

## 2024-01-22 NOTE — PROGRESS NOTES
Villa is a 63 year old who is being evaluated via a billable telephone visit.      What phone number would you like to be contacted at? [phone]  How would you like to obtain your AVS? Tr    [place] (provider location):  On-site    Assessment & Plan     Polyarthralgia  Widespread hand flare for over a month now.  Trial of oral steroid.  Continue to work with ortho.  Warned about side effects, etc.    - predniSONE (DELTASONE) 20 MG tablet; Take 1 tablet (20 mg) by mouth 2 times daily for 5 days                No follow-ups on file.    Subjective   Villa is a 63 year old, presenting for the following health issues:  Hand Problem    HPI     Hand problem     Duration: ongoing  Description (location/character/radiation): bilateral hands   Intensity:  moderate  Accompanying signs and symptoms: arthritis in hands   History (similar episodes/previous evaluation): None  Precipitating or alleviating factors: None  Therapies tried and outcome: pt would like to discuss medication              Review of Systems  Constitutional, HEENT, cardiovascular, pulmonary, gi and gu systems are negative, except as otherwise noted.      Objective           Vitals:  No vitals were obtained today due to virtual visit.    Physical Exam   General: Alert and no distress //Respiratory: No audible wheeze, cough, or shortness of breath // Psychiatric:  Appropriate affect, tone, and pace of words            Phone call duration: 7 minutes  Signed Electronically by: Judha Moody MD

## 2024-02-13 ENCOUNTER — OFFICE VISIT (OUTPATIENT)
Dept: OTOLARYNGOLOGY | Facility: OTHER | Age: 64
End: 2024-02-13
Attending: NURSE PRACTITIONER
Payer: COMMERCIAL

## 2024-02-13 VITALS
BODY MASS INDEX: 30.78 KG/M2 | RESPIRATION RATE: 16 BRPM | TEMPERATURE: 96.6 F | HEART RATE: 71 BPM | SYSTOLIC BLOOD PRESSURE: 148 MMHG | DIASTOLIC BLOOD PRESSURE: 90 MMHG | HEIGHT: 70 IN | WEIGHT: 215 LBS | OXYGEN SATURATION: 97 %

## 2024-02-13 DIAGNOSIS — J98.01 ACUTE BRONCHOSPASM: ICD-10-CM

## 2024-02-13 DIAGNOSIS — H91.93 DECREASED HEARING OF BOTH EARS: ICD-10-CM

## 2024-02-13 DIAGNOSIS — H61.23 BILATERAL IMPACTED CERUMEN: Primary | ICD-10-CM

## 2024-02-13 DIAGNOSIS — H60.543 ECZEMA OF BOTH EXTERNAL EARS: ICD-10-CM

## 2024-02-13 PROCEDURE — 69210 REMOVE IMPACTED EAR WAX UNI: CPT | Mod: 50 | Performed by: NURSE PRACTITIONER

## 2024-02-13 PROCEDURE — G0463 HOSPITAL OUTPT CLINIC VISIT: HCPCS

## 2024-02-13 PROCEDURE — 99213 OFFICE O/P EST LOW 20 MIN: CPT | Mod: 25 | Performed by: NURSE PRACTITIONER

## 2024-02-13 RX ORDER — ALBUTEROL SULFATE 90 UG/1
AEROSOL, METERED RESPIRATORY (INHALATION)
Qty: 18 G | Refills: 3 | Status: SHIPPED | OUTPATIENT
Start: 2024-02-13 | End: 2024-06-27

## 2024-02-13 RX ORDER — MOMETASONE FUROATE 1 MG/G
OINTMENT TOPICAL
Qty: 15 G | Refills: 1 | Status: SHIPPED | OUTPATIENT
Start: 2024-02-13

## 2024-02-13 ASSESSMENT — PAIN SCALES - GENERAL: PAINLEVEL: NO PAIN (0)

## 2024-02-13 NOTE — PATIENT INSTRUCTIONS
Thank you for allowing Veronica Chou and our ENT team to participate in your care.  If your medications are too expensive, please give the nurse a call.  We can possibly change this medication.  If you have a scheduling or an appointment question please contact our Health Unit Coordinator at their direct line [phone].   ALL nursing questions or concerns can be directed to your ENT nurse at: [phone] - Monique \    Audiogram recommended     Start mometasone cream use as prescribed     If you continue to experience symptoms we will see you back to biopsy the lesion.

## 2024-02-13 NOTE — TELEPHONE ENCOUNTER
Ventolin HFA  Last Written Prescription Date: 1/19/24  Last Fill Quantity: 18 g # of Refills: 0  Last Office Visit: 1/22/24

## 2024-02-13 NOTE — LETTER
2/13/2024         RE: Villa Ruiz  [address]  [address]  [facility] [address]        Dear Colleague,    Thank you for referring your patient, Villa Ruiz, to the [facility]. Please see a copy of my visit note below.    Otolaryngology Note         Chief Complaint:     Patient presents with:  Cerumen Impaction: Ear cleaning            History of Present Illness:     Villa Ruiz is a 63 year old male who presents today for ear cleaning.    He has noted a pimple in the left ear.  It reoccurs  He uses rubbing alcohol on a cotton swab to clean his ears.    Last ear cleaning was completed in 2020    He has noted that he has to turn the TV up louder recently.   No tinnitus, vertigo, facial numbness or weakness.      No otalgia, otorrhea.    No hx of COM or otologic surgeries.     Audiogram completed 6/23/2020:  Tympanograms are Type A for right ear suggesting normal eardrum mobility and Type AS left-reduced admittance..  Acoustic Reflex Thresholds at 1000 Hz are present for both ears.  Thresholds are normal (slight condutive right at 250 Hz) sloping to moderate sensorineural hearing loss [id number] high frequencies compared to 2005.  Speech reception thresholds are in good agreement with pure tone average.  Word discrimination scores are excellent at supra-thresholds level.         Medications:     Current Outpatient Rx   Medication Sig Dispense Refill     cetirizine (ZYRTEC) 10 MG tablet Take 1 tablet by mouth once daily 30 tablet 1     diclofenac (VOLTAREN) 1 % topical gel APPLY   TOPICALLY TO AFFECTED AREA AS NEEDED FOR  PAIN 350 g 3     mometasone (ELOCON) 0.1 % external ointment Apply to the outer ears daily x 10 days then as needed for dry skin 15 g 1     VENTOLIN  (90 Base) MCG/ACT inhaler INHALE 2 PUFFS BY MOUTH EVERY 6 HOURS 18 g 0            Allergies:     Allergies: Amoxicillin-pot clavulanate          Past Medical History:     No past medical history on file.          "Past Surgical History:     No past surgical history on file.    ENT family history reviewed         Social History:     Social History     Tobacco Use     Smoking status: Every Day     Packs/day: 0.75     Years: 6.00     Additional pack years: 0.00     Total pack years: 4.50     Types: Cigarettes, Cigars     Start date: 1/1/2012     Smokeless tobacco: Never     Tobacco comments:     pt denied QP 6/23/20   Substance Use Topics     Alcohol use: No     Alcohol/week: 0.0 standard drinks of alcohol     Drug use: No            Review of Systems:     ROS: See HPI         Physical Exam:     BP (!) 148/90 (BP Location: Right arm, Patient Position: Sitting, Cuff Size: Adult Large)   Pulse 71   Temp (!) 96.6  F (35.9  C) (Tympanic)   Resp 16   Ht 1.778 m (5' 10\")   Wt 97.5 kg (215 lb)   SpO2 97%   BMI 30.85 kg/m      General - The patient is well nourished and well developed, and appears to have good nutritional status.  Alert and oriented to person and place, answers questions and cooperates with examination appropriately.   Head and Face - Normocephalic and atraumatic, with no gross asymmetry noted.  The facial nerve is intact, with strong symmetric movements.  Voice and Breathing - The patient was breathing comfortably without the use of accessory muscles. There was no wheezing, stridor. The patients voice was clear and strong, and had appropriate pitch and quality.  Ears - The ears were examined with binocular microscopy and with otoscope.  External ears - bilateral carlene bowls with eczematous changes.  The left carlene bowl has a very minimally raised scabbed over area.  No concerns for ulcerations or masses.  Right canal cerumen impacted.  Left canal cerumen impacted.  The right ear was cleaned with cerumen loop, cupped forceps.   Right tympanic membrane is intact without effusion, retraction or mass. The left ear was cleaned with cerumen loop and cupped forceps.  Left tympanic membrane is intact without effusion, " retraction or mass.  Eyes - Extraocular movements intact, sclera were not icteric or injected, conjunctiva were pink and moist.  Mouth - Examination of the oral cavity showed pink, healthy oral mucosa. Dentition in good condition. No lesions or ulcerations noted. The tongue was mobile and midline.   Throat - The walls of the oropharynx were smooth, pink, moist, symmetric, and had no lesions or ulcerations.  The tonsillar pillars and soft palate were symmetric. The uvula was midline on elevation.    Neck - Normal ROM, no worrisome palpable lymphadenopathy.  Palpation of the thyroid was soft and smooth, with no nodules or goiter appreciated.  The trachea was mobile and midline.  Nose - External contour is symmetric, no gross deflection or scars.  Nasal mucosa is pink and moist with no abnormal mucus.  The septum and turbinates were evaluated with nasal speculum, no polyps, masses, or purulence noted on examination.         Assessment and Plan:       ICD-10-CM    1. Bilateral impacted cerumen  H61.23       2. Decreased hearing of both ears  H91.93 Adult Audiology  Referral      3. Eczema of both external ears  H60.543 mometasone (ELOCON) 0.1 % external ointment        Use mometasone as prescribed to the outer ears bilaterally.    Recommend repeat audiogram  Follow up if left ear lesion persists and we will biopsy.      Ears were cleaned, tolerated well    The ears were cleaned today.  The patient may return here as needed or with their primary physician.  Aural hygiene was discussed.  Avoidance of Q-tips was reiterated.  Avoid flushing the ear canal if there is a possibility of a hole or perforation in the tympanic membrane.   If there are any unresolved concerns regarding hearing loss an audiogram is recommended.      Veronica GARCIA  [facility]      Again, thank you for allowing me to participate in the care of your patient.        Sincerely,        Veronica Chou NP

## 2024-02-13 NOTE — PROGRESS NOTES
Otolaryngology Note         Chief Complaint:     Patient presents with:  Cerumen Impaction: Ear cleaning            History of Present Illness:     Villa Ruiz is a 63 year old male who presents today for ear cleaning.    He has noted a pimple in the left ear.  It reoccurs  He uses rubbing alcohol on a cotton swab to clean his ears.    Last ear cleaning was completed in 2020    He has noted that he has to turn the TV up louder recently.   No tinnitus, vertigo, facial numbness or weakness.      No otalgia, otorrhea.    No hx of COM or otologic surgeries.     Audiogram completed 6/23/2020:  Tympanograms are Type A for right ear suggesting normal eardrum mobility and Type AS left-reduced admittance..  Acoustic Reflex Thresholds at 1000 Hz are present for both ears.  Thresholds are normal (slight condutive right at 250 Hz) sloping to moderate sensorineural hearing loss [id number] high frequencies compared to 2005.  Speech reception thresholds are in good agreement with pure tone average.  Word discrimination scores are excellent at supra-thresholds level.         Medications:     Current Outpatient Rx   Medication Sig Dispense Refill    cetirizine (ZYRTEC) 10 MG tablet Take 1 tablet by mouth once daily 30 tablet 1    diclofenac (VOLTAREN) 1 % topical gel APPLY   TOPICALLY TO AFFECTED AREA AS NEEDED FOR  PAIN 350 g 3    mometasone (ELOCON) 0.1 % external ointment Apply to the outer ears daily x 10 days then as needed for dry skin 15 g 1    VENTOLIN  (90 Base) MCG/ACT inhaler INHALE 2 PUFFS BY MOUTH EVERY 6 HOURS 18 g 0            Allergies:     Allergies: Amoxicillin-pot clavulanate          Past Medical History:     No past medical history on file.         Past Surgical History:     No past surgical history on file.    ENT family history reviewed         Social History:     Social History     Tobacco Use    Smoking status: Every Day     Packs/day: 0.75     Years: 6.00     Additional pack years: 0.00      "Total pack years: 4.50     Types: Cigarettes, Cigars     Start date: 1/1/2012    Smokeless tobacco: Never    Tobacco comments:     pt denied QP 6/23/20   Substance Use Topics    Alcohol use: No     Alcohol/week: 0.0 standard drinks of alcohol    Drug use: No            Review of Systems:     ROS: See HPI         Physical Exam:     BP (!) 148/90 (BP Location: Right arm, Patient Position: Sitting, Cuff Size: Adult Large)   Pulse 71   Temp (!) 96.6  F (35.9  C) (Tympanic)   Resp 16   Ht 1.778 m (5' 10\")   Wt 97.5 kg (215 lb)   SpO2 97%   BMI 30.85 kg/m      General - The patient is well nourished and well developed, and appears to have good nutritional status.  Alert and oriented to person and place, answers questions and cooperates with examination appropriately.   Head and Face - Normocephalic and atraumatic, with no gross asymmetry noted.  The facial nerve is intact, with strong symmetric movements.  Voice and Breathing - The patient was breathing comfortably without the use of accessory muscles. There was no wheezing, stridor. The patients voice was clear and strong, and had appropriate pitch and quality.  Ears - The ears were examined with binocular microscopy and with otoscope.  External ears - bilateral carlene bowls with eczematous changes.  The left carlene bowl has a very minimally raised scabbed over area.  No concerns for ulcerations or masses.  Right canal cerumen impacted.  Left canal cerumen impacted.  The right ear was cleaned with cerumen loop, cupped forceps.   Right tympanic membrane is intact without effusion, retraction or mass. The left ear was cleaned with cerumen loop and cupped forceps.  Left tympanic membrane is intact without effusion, retraction or mass.  Eyes - Extraocular movements intact, sclera were not icteric or injected, conjunctiva were pink and moist.  Mouth - Examination of the oral cavity showed pink, healthy oral mucosa. Dentition in good condition. No lesions or ulcerations " noted. The tongue was mobile and midline.   Throat - The walls of the oropharynx were smooth, pink, moist, symmetric, and had no lesions or ulcerations.  The tonsillar pillars and soft palate were symmetric. The uvula was midline on elevation.    Neck - Normal ROM, no worrisome palpable lymphadenopathy.  Palpation of the thyroid was soft and smooth, with no nodules or goiter appreciated.  The trachea was mobile and midline.  Nose - External contour is symmetric, no gross deflection or scars.  Nasal mucosa is pink and moist with no abnormal mucus.  The septum and turbinates were evaluated with nasal speculum, no polyps, masses, or purulence noted on examination.         Assessment and Plan:       ICD-10-CM    1. Bilateral impacted cerumen  H61.23       2. Decreased hearing of both ears  H91.93 Adult Audiology  Referral      3. Eczema of both external ears  H60.543 mometasone (ELOCON) 0.1 % external ointment        Use mometasone as prescribed to the outer ears bilaterally.    Recommend repeat audiogram  Follow up if left ear lesion persists and we will biopsy.      Ears were cleaned, tolerated well    The ears were cleaned today.  The patient may return here as needed or with their primary physician.  Aural hygiene was discussed.  Avoidance of Q-tips was reiterated.  Avoid flushing the ear canal if there is a possibility of a hole or perforation in the tympanic membrane.   If there are any unresolved concerns regarding hearing loss an audiogram is recommended.      Veronica Chou NP-C  [facility]

## 2024-05-14 ENCOUNTER — TRANSFERRED RECORDS (OUTPATIENT)
Dept: HEALTH INFORMATION MANAGEMENT | Facility: CLINIC | Age: 64
End: 2024-05-14

## 2024-07-15 ENCOUNTER — TELEPHONE (OUTPATIENT)
Dept: FAMILY MEDICINE | Facility: OTHER | Age: 64
End: 2024-07-15

## 2024-07-15 NOTE — TELEPHONE ENCOUNTER
9:09 AM    Reason for Call: Phone Call    Description: pt just started medicare and [facility] needs approval for ventolin, needs to fax letter for medical necessity    Was an appointment offered for this call? No  If yes : Appointment type              Date    Preferred method for responding to this message: Telephone Call  What is your phone number ?[phone]     If we cannot reach you directly, may we leave a detailed response at the number you provided? Yes    Can this message wait until your PCP/provider returns, if available today? Not applicable    Jli Suero

## 2024-07-16 NOTE — TELEPHONE ENCOUNTER
Pt received a letter from [facility] that states he will receive a temporary supply of ventolin inhaler.  Can you do a PA for ventolin HFA 90 mcg inhaler?

## 2024-07-22 ENCOUNTER — TELEPHONE (OUTPATIENT)
Dept: FAMILY MEDICINE | Facility: OTHER | Age: 64
End: 2024-07-22

## 2024-07-22 NOTE — TELEPHONE ENCOUNTER
Received a PA request for VENTOLIN  (90 Base) MCG/ACT inhaler. Submitted on CMM. Waiting for a response.

## 2024-07-22 NOTE — TELEPHONE ENCOUNTER
Received an APPROVAL from [employer] [facility] for VENTOLIN  (90 Base) MCG/ACT inhaler. Effective dates 6/22/24-7/22/25.

## 2024-07-24 DIAGNOSIS — J98.01 ACUTE BRONCHOSPASM: ICD-10-CM

## 2024-07-24 RX ORDER — ALBUTEROL SULFATE 90 UG/1
AEROSOL, METERED RESPIRATORY (INHALATION)
Qty: 18 G | Refills: 3 | Status: SHIPPED | OUTPATIENT
Start: 2024-07-24

## 2024-08-31 ENCOUNTER — HEALTH MAINTENANCE LETTER (OUTPATIENT)
Age: 64
End: 2024-08-31

## 2024-10-14 ENCOUNTER — OFFICE VISIT (OUTPATIENT)
Dept: FAMILY MEDICINE | Facility: OTHER | Age: 64
End: 2024-10-14
Attending: FAMILY MEDICINE
Payer: MEDICARE

## 2024-10-14 VITALS
DIASTOLIC BLOOD PRESSURE: 72 MMHG | HEART RATE: 71 BPM | BODY MASS INDEX: 29.08 KG/M2 | SYSTOLIC BLOOD PRESSURE: 126 MMHG | OXYGEN SATURATION: 98 % | WEIGHT: 202.7 LBS | TEMPERATURE: 97.8 F

## 2024-10-14 DIAGNOSIS — R39.9 UTI SYMPTOMS: Primary | ICD-10-CM

## 2024-10-14 DIAGNOSIS — R31.9 HEMATURIA, UNSPECIFIED TYPE: ICD-10-CM

## 2024-10-14 PROCEDURE — G0463 HOSPITAL OUTPT CLINIC VISIT: HCPCS

## 2024-10-14 PROCEDURE — 99213 OFFICE O/P EST LOW 20 MIN: CPT | Performed by: FAMILY MEDICINE

## 2024-10-14 PROCEDURE — G2211 COMPLEX E/M VISIT ADD ON: HCPCS | Performed by: FAMILY MEDICINE

## 2024-10-14 ASSESSMENT — ENCOUNTER SYMPTOMS: HEMATURIA: 1

## 2024-10-14 ASSESSMENT — PAIN SCALES - GENERAL: PAINLEVEL: NO PAIN (0)

## 2024-10-14 NOTE — PROGRESS NOTES
"  Assessment & Plan     UTI symptoms   Had blood.  It is resolved.  He feels a chunk might have passed so I suspect stone.  Getting UA.  Offer CT but he will wait on that.  If no blood, reasonable to wait.  If still blood, recommend CT.  He will think about it.    - UA Macroscopic with reflex to Microscopic and Culture; Future    Hematuria, unspecified type  As above.  Nice review.  He couldn't go yet but he will get UA.            Nicotine/Tobacco Cessation  He reports that he has been smoking cigarettes and cigars. He started smoking about 12 years ago. He has a 9.6 pack-year smoking history. He has never used smokeless tobacco.  Nicotine/Tobacco Cessation Plan  Information offered: Patient not interested at this time      BMI  Estimated body mass index is 29.08 kg/m  as calculated from the following:    Height as of 2/13/24: 1.778 m (5' 10\").    Weight as of this encounter: 91.9 kg (202 lb 11.2 oz).             No follow-ups on file.    Fredo Driver is a 64 year old, presenting for the following health issues:  Hematuria      10/14/2024     1:31 PM   Additional Questions   Roomed by Earline ROLLINS     Hematuria         Genitourinary - Male  Onset/Duration: Started last Wednesday, 10/9/24  Description:   Dysuria (painful urination): No  Hematuria (blood in urine): YES  Frequency: YES  Waking at night to urinate: YES  Hesitancy (delay in urine): No  Retention (unable to empty): YES- a couple different times, urethra plugged up  Decrease in urinary flow: YES- only when it was plugged up  Incontinence: No  Progression of Symptoms:  improving  Accompanying Signs & Symptoms:  Fever: YES- last Wednesday and Thursday  Back/Flank pain: No  Urethral discharge: No  Testicle lumps/masses/pain: No  Nausea and/or vomiting: No  Abdominal pain: YES- right lower abdomen- pain is better now   History:   History of frequent UTI s: No  History of kidney stones: No  History of hernias: No  Personal or Family history of Prostate " problems: No  Sexually active: YES  Precipitating or alleviating factors: None  Therapies tried and outcome: increase fluid intake        Review of Systems  Constitutional, HEENT, cardiovascular, pulmonary, gi and gu systems are negative, except as otherwise noted.      Objective    /72 (BP Location: Right arm, Patient Position: Sitting, Cuff Size: Adult Regular)   Pulse 71   Temp 97.8  F (36.6  C) (Tympanic)   Wt 91.9 kg (202 lb 11.2 oz)   SpO2 98%   BMI 29.08 kg/m    Body mass index is 29.08 kg/m .  Physical Exam   GENERAL: alert and no distress  NECK: no adenopathy, no asymmetry, masses, or scars  RESP: lungs clear to auscultation - no rales, rhonchi or wheezes  CV: regular rate and rhythm, normal S1 S2, no S3 or S4, no murmur, click or rub, no peripheral edema  ABDOMEN: soft, nontender, no hepatosplenomegaly, no masses and bowel sounds normal  MS: no gross musculoskeletal defects noted, no edema    Ua pending.  He will leave when he can.      Consider CT if any blood present.      The longitudinal plan of care for the diagnosis(es)/condition(s) as documented were addressed during this visit. Due to the added complexity in care, I will continue to support Villa in the subsequent management and with ongoing continuity of care.        Signed Electronically by: Judah Moody MD

## 2024-11-12 DIAGNOSIS — J98.01 ACUTE BRONCHOSPASM: ICD-10-CM

## 2024-11-12 RX ORDER — ALBUTEROL SULFATE 90 UG/1
AEROSOL, METERED RESPIRATORY (INHALATION)
Qty: 18 G | Refills: 0 | Status: SHIPPED | OUTPATIENT
Start: 2024-11-12

## 2024-11-14 ENCOUNTER — TELEPHONE (OUTPATIENT)
Dept: FAMILY MEDICINE | Facility: OTHER | Age: 64
End: 2024-11-14

## 2024-11-14 NOTE — TELEPHONE ENCOUNTER
11:58 AM    Reason for Call: Phone Call    Description: pt wants to know what kind of flus are going around and what he should be taking/stomach ache, bloating    Was an appointment offered for this call? No  If yes : Appointment type              Date    Preferred method for responding to this message: Telephone Call  What is your phone number ?[phone]     If we cannot reach you directly, may we leave a detailed response at the number you provided? Yes    Can this message wait until your PCP/provider returns, if available today? Not applicable    Jil Suero

## 2024-11-14 NOTE — TELEPHONE ENCOUNTER
Spoke with pt and answered his questions.  He will go to the pharmacy and speak with the pharmacist about OTC meds.

## 2024-11-18 ENCOUNTER — OFFICE VISIT (OUTPATIENT)
Dept: CHIROPRACTIC MEDICINE | Facility: OTHER | Age: 64
End: 2024-11-18
Attending: CHIROPRACTOR
Payer: MEDICARE

## 2024-11-18 DIAGNOSIS — M99.01 SEGMENTAL AND SOMATIC DYSFUNCTION OF CERVICAL REGION: Primary | ICD-10-CM

## 2024-11-18 DIAGNOSIS — M99.02 SEGMENTAL AND SOMATIC DYSFUNCTION OF THORACIC REGION: ICD-10-CM

## 2024-11-18 DIAGNOSIS — M54.2 CERVICALGIA: ICD-10-CM

## 2024-11-18 PROCEDURE — 98940 CHIROPRACT MANJ 1-2 REGIONS: CPT | Mod: AT | Performed by: CHIROPRACTOR

## 2024-11-19 ENCOUNTER — OFFICE VISIT (OUTPATIENT)
Dept: CHIROPRACTIC MEDICINE | Facility: OTHER | Age: 64
End: 2024-11-19
Attending: CHIROPRACTOR
Payer: MEDICARE

## 2024-11-19 DIAGNOSIS — M54.2 CERVICALGIA: ICD-10-CM

## 2024-11-19 DIAGNOSIS — M99.02 SEGMENTAL AND SOMATIC DYSFUNCTION OF THORACIC REGION: ICD-10-CM

## 2024-11-19 DIAGNOSIS — M99.01 SEGMENTAL AND SOMATIC DYSFUNCTION OF CERVICAL REGION: Primary | ICD-10-CM

## 2024-11-20 NOTE — PROGRESS NOTES
Subjective Finding:    Chief compalint: Patient presents with:  Neck Pain: Right sided , Pain Scale: 7/10, Intensity: sharp, Duration: 2 days, Radiating: no.    Date of injury:     Activities that the pain restricts:   Home/household/hobbies/social activities: No.  Work duties: Yes.  Sleep: No.  Makes symptoms better: rest.  Makes symptoms worse: cervical flexion.  Have you seen anyone else for the symptoms? No.  Work related: No.  Automobile related injury: No.    Objective and Assessment:    Posture Analysis:   High shoulder: right.  Head tilt: right.  High iliac crest: .  Head carriage: forward.  Thoracic Kyphosis: neutral.  Lumbar Lordosis: neutral.    Lumbar Range of Motion: .  Cervical Range of Motion: left lateral flexion decreased and right lateral flexion decreased.  Thoracic Range of Motion: .  Extremity Range of Motion: .    Palpation:   Lev scapulae: stiff, referred pain: no    Segmental dysfunction pre-treatment and treatment area: C5, C6, and T3.    Assessment post-treatment:  Cervical: ROM increased.  Thoracic: ROM increased.  Lumbar: .    Comments: .      Complicating Factors: .    Procedure(s):  CMT:  49678 Chiropractic manipulative treatment 1-2 regions performed   Cervical: Diversified, See above for level, Supine and Thoracic: Diversified, See above for level, Prone    Modalities:  None performed this visit    Therapeutic procedures:  None    Plan:  Treatment plan: PRN.  Instructed patient: stretch as instructed at visit.  Short term goals: reduce pain.  Long term goals: .  Prognosis: very good.

## 2024-11-21 ENCOUNTER — OFFICE VISIT (OUTPATIENT)
Dept: CHIROPRACTIC MEDICINE | Facility: OTHER | Age: 64
End: 2024-11-21
Attending: CHIROPRACTOR
Payer: MEDICARE

## 2024-11-21 DIAGNOSIS — M99.01 SEGMENTAL AND SOMATIC DYSFUNCTION OF CERVICAL REGION: Primary | ICD-10-CM

## 2024-11-21 DIAGNOSIS — M99.02 SEGMENTAL AND SOMATIC DYSFUNCTION OF THORACIC REGION: ICD-10-CM

## 2024-11-21 DIAGNOSIS — M54.2 CERVICALGIA: ICD-10-CM

## 2024-11-21 PROCEDURE — 98940 CHIROPRACT MANJ 1-2 REGIONS: CPT | Mod: AT | Performed by: CHIROPRACTOR

## 2024-11-21 NOTE — PROGRESS NOTES
Subjective Finding:    Chief compalint: Patient presents with:  Neck Pain: return  , Pain Scale: 4/10, Intensity: sharp, Duration: 3 days, Radiating: no.    Date of injury:     Activities that the pain restricts:   Home/household/hobbies/social activities: No.  Work duties: Yes.  Sleep: No.  Makes symptoms better: rest.  Makes symptoms worse: cervical flexion.  Have you seen anyone else for the symptoms? No.  Work related: No.  Automobile related injury: No.    Objective and Assessment:    Posture Analysis:   High shoulder: right.  Head tilt: right.  High iliac crest: .  Head carriage: forward.  Thoracic Kyphosis: neutral.  Lumbar Lordosis: neutral.    Lumbar Range of Motion: .  Cervical Range of Motion: left lateral flexion decreased and right lateral flexion decreased.  Thoracic Range of Motion: .  Extremity Range of Motion: .    Palpation:   Lev scapulae: stiff, referred pain: no    Segmental dysfunction pre-treatment and treatment area: C5, C6, and T3.    Assessment post-treatment:  Cervical: ROM increased.  Thoracic: ROM increased.  Lumbar: .    Comments: .      Complicating Factors: .    Procedure(s):  CMT:  29406 Chiropractic manipulative treatment 1-2 regions performed   Cervical: Diversified, See above for level, Supine and Thoracic: Diversified, See above for level, Prone    Modalities:  None performed this visit    Therapeutic procedures:  None    Plan:  Treatment plan: PRN.  Instructed patient: stretch as instructed at visit.  Short term goals: reduce pain.  Long term goals: .  Prognosis: very good.

## 2024-11-25 NOTE — PROGRESS NOTES
Subjective Finding:    Chief compalint: Patient presents with:  Neck Pain , Pain Scale: 3/10, Intensity: dull, Duration: 2 weeks, Radiating: no.    Date of injury:     Activities that the pain restricts:   Home/household/hobbies/social activities: Yes.  Work duties: Yes.  Sleep: No.  Makes symptoms better: rest.  Makes symptoms worse: cervical extension and cervical flexion.  Have you seen anyone else for the symptoms? No.  Work related: No.  Automobile related injury: No.    Objective and Assessment:    Posture Analysis:   High shoulder: .  Head tilt: .  High iliac crest: .  Head carriage: forward.  Thoracic Kyphosis: neutral.  Lumbar Lordosis: neutral.    Lumbar Range of Motion: .  Cervical Range of Motion: left rotation decreased and right rotation decreased.  Thoracic Range of Motion: .  Extremity Range of Motion: .    Palpation:   Traps: ache, referred pain: no    Segmental dysfunction pre-treatment and treatment area: C6, C7, and T8.    Assessment post-treatment:  Cervical: ROM increased.  Thoracic: ROM increased.  Lumbar: .    Comments: .      Complicating Factors: .    Procedure(s):  CMT:  98877 Chiropractic manipulative treatment 1-2 regions performed   Cervical: Diversified, See above for level, Supine and Thoracic: Diversified, See above for level, Prone    Modalities:  None performed this visit    Therapeutic procedures:  None    Plan:  Treatment plan: PRN.  Instructed patient: stretch as instructed at visit.  Short term goals: reduce pain.  Long term goals: increase ADL.  Prognosis: excellent.

## 2024-12-11 ENCOUNTER — TELEPHONE (OUTPATIENT)
Dept: FAMILY MEDICINE | Facility: OTHER | Age: 64
End: 2024-12-11

## 2024-12-11 NOTE — TELEPHONE ENCOUNTER
4:02 PM    Reason for Call: OVERBOOK    Patient is having the following symptoms: poss uti  The patient is requesting an appointment for this week with Dr Moody.    Was an appointment offered for this call? No  If yes : Appointment type              Date    Preferred method for responding to this message: Telephone Call  What is your phone number ?[phone]     If we cannot reach you directly, may we leave a detailed response at the number you provided? Yes    Can this message wait until your PCP/provider returns, if unavailable today? Not applicable    Jil Suero

## 2024-12-11 NOTE — PROGRESS NOTES
"  Assessment & Plan     Dysuria  Culture urine;.    - UA Macroscopic with reflex to Microscopic and Culture; Future  - UA Macroscopic with reflex to Microscopic and Culture  - UA Microscopic with Reflex to Culture    Constipation, unspecified constipation type  Miralax for ongoing sx.  He is taking docusate.  This might contribute to the urinary sx as well.    - polyethylene glycol (MIRALAX) 17 GM/Dose powder; Take 17 g (1 Capful) by mouth daily.    Nocturia  Discussed.  Longstanding and it sounds like BPH.  Got the PSA today as well after discussion.  Trial of flomax and monitor sx.    - PSA, screen; Future  - tamsulosin (FLOMAX) 0.4 MG capsule; Take 1 capsule (0.4 mg) by mouth daily.    Encounter for screening for malignant neoplasm of prostate  As above.  Update psa.    - PSA, screen; Future          BMI  Estimated body mass index is 26.98 kg/m  as calculated from the following:    Height as of 2/13/24: 1.778 m (5' 10\").    Weight as of this encounter: 85.3 kg (188 lb).             No follow-ups on file.    Fredo Driver is a 64 year old, presenting for the following health issues:  UTI        12/12/2024     8:25 AM   Additional Questions   Roomed by Haley PENDLETON       Genitourinary symptoms    Duration: 1 week   Description:  frequency and hesitancy  Intensity:  moderate  Accompanying signs and symptoms (fever/discharge/nausea/vomiting/back or abdominal pain):  None  History (frequent UTI's/kidney stones/prostate problems): None  Sexually active:   Precipitating or alleviating factors: None  Therapies tried and outcome: increase fluid intake          Review of Systems  Constitutional, HEENT, cardiovascular, pulmonary, gi and gu systems are negative, except as otherwise noted.      Objective    /68   Pulse 83   Temp 97.1  F (36.2  C) (Tympanic)   Wt 85.3 kg (188 lb)   SpO2 91%   BMI 26.98 kg/m    Body mass index is 26.98 kg/m .  Physical Exam   GENERAL: alert and no distress  NECK: no adenopathy, no " asymmetry, masses, or scars  RESP: lungs clear to auscultation - no rales, rhonchi or wheezes  CV: regular rate and rhythm, normal S1 S2, no S3 or S4, no murmur, click or rub, no peripheral edema  ABDOMEN: soft, nontender, no hepatosplenomegaly, no masses and bowel sounds normal  MS: no gross musculoskeletal defects noted, no edema    UA borderline cx pending.  PSA pending.      The longitudinal plan of care for the diagnosis(es)/condition(s) as documented were addressed during this visit. Due to the added complexity in care, I will continue to support Villa in the subsequent management and with ongoing continuity of care.        Signed Electronically by: Jduah Moody MD

## 2024-12-12 ENCOUNTER — OFFICE VISIT (OUTPATIENT)
Dept: FAMILY MEDICINE | Facility: OTHER | Age: 64
End: 2024-12-12
Attending: FAMILY MEDICINE
Payer: MEDICARE

## 2024-12-12 VITALS
BODY MASS INDEX: 26.98 KG/M2 | WEIGHT: 188 LBS | HEART RATE: 83 BPM | OXYGEN SATURATION: 91 % | TEMPERATURE: 97.1 F | SYSTOLIC BLOOD PRESSURE: 130 MMHG | DIASTOLIC BLOOD PRESSURE: 68 MMHG

## 2024-12-12 DIAGNOSIS — R97.20 ELEVATED PROSTATE SPECIFIC ANTIGEN (PSA): Primary | ICD-10-CM

## 2024-12-12 DIAGNOSIS — R30.0 DYSURIA: Primary | ICD-10-CM

## 2024-12-12 DIAGNOSIS — K59.00 CONSTIPATION, UNSPECIFIED CONSTIPATION TYPE: ICD-10-CM

## 2024-12-12 DIAGNOSIS — R35.1 NOCTURIA: ICD-10-CM

## 2024-12-12 DIAGNOSIS — Z12.5 ENCOUNTER FOR SCREENING FOR MALIGNANT NEOPLASM OF PROSTATE: ICD-10-CM

## 2024-12-12 LAB
ALBUMIN UR-MCNC: NEGATIVE MG/DL
AMORPH CRY #/AREA URNS HPF: ABNORMAL /HPF
APPEARANCE UR: ABNORMAL
BACTERIA #/AREA URNS HPF: ABNORMAL /HPF
BILIRUB UR QL STRIP: NEGATIVE
COLOR UR AUTO: YELLOW
GLUCOSE UR STRIP-MCNC: NEGATIVE MG/DL
HGB UR QL STRIP: NEGATIVE
KETONES UR STRIP-MCNC: NEGATIVE MG/DL
LEUKOCYTE ESTERASE UR QL STRIP: NEGATIVE
NITRATE UR QL: NEGATIVE
PH UR STRIP: 7.5 [PH] (ref 5–7)
PSA SERPL DL<=0.01 NG/ML-MCNC: 6.88 NG/ML (ref 0–4.5)
RBC #/AREA URNS AUTO: ABNORMAL /HPF
SP GR UR STRIP: 1.01 (ref 1–1.03)
SQUAMOUS #/AREA URNS AUTO: ABNORMAL /LPF
UROBILINOGEN UR STRIP-ACNC: 0.2 E.U./DL
WBC #/AREA URNS AUTO: ABNORMAL /HPF

## 2024-12-12 PROCEDURE — 81003 URINALYSIS AUTO W/O SCOPE: CPT | Mod: ZL | Performed by: FAMILY MEDICINE

## 2024-12-12 PROCEDURE — 36415 COLL VENOUS BLD VENIPUNCTURE: CPT | Mod: ZL | Performed by: FAMILY MEDICINE

## 2024-12-12 PROCEDURE — G0463 HOSPITAL OUTPT CLINIC VISIT: HCPCS

## 2024-12-12 PROCEDURE — G0103 PSA SCREENING: HCPCS | Mod: ZL | Performed by: FAMILY MEDICINE

## 2024-12-12 PROCEDURE — 81001 URINALYSIS AUTO W/SCOPE: CPT | Mod: ZL | Performed by: FAMILY MEDICINE

## 2024-12-12 RX ORDER — TAMSULOSIN HYDROCHLORIDE 0.4 MG/1
0.4 CAPSULE ORAL DAILY
Qty: 90 CAPSULE | Refills: 3 | Status: SHIPPED | OUTPATIENT
Start: 2024-12-12

## 2024-12-12 RX ORDER — POLYETHYLENE GLYCOL 3350 17 G/17G
1 POWDER, FOR SOLUTION ORAL DAILY
Qty: 578 G | Refills: 2 | Status: SHIPPED | OUTPATIENT
Start: 2024-12-12

## 2024-12-12 ASSESSMENT — PAIN SCALES - GENERAL: PAINLEVEL_OUTOF10: NO PAIN (0)

## 2024-12-14 LAB — BACTERIA UR CULT: NO GROWTH

## 2024-12-26 DIAGNOSIS — J98.01 ACUTE BRONCHOSPASM: ICD-10-CM

## 2024-12-26 RX ORDER — ALBUTEROL SULFATE 90 UG/1
AEROSOL, METERED RESPIRATORY (INHALATION)
Qty: 18 G | Refills: 3 | Status: SHIPPED | OUTPATIENT
Start: 2024-12-26

## 2024-12-27 NOTE — TELEPHONE ENCOUNTER
FOLLOW-UP  VISIT      Verbal permission granted by patient to leave a detailed message with medical information at phone number listed in Epic. yes    Patient is male    Patient is here today with Self    Chief Complaint   Patient presents with    Skin Assessment    Office Visit     HISTORY OF PRESENT ILLNESS: The patient is a 76 year old male here today for full skin exam.  History of  MMS for treatment of sBCC performed 8/28/24. Dr. Hoyt recommended Efudex ears following.  Patient states left antihelix is still sore with pressure however no bleeding or change noted in this area.  States the soreness is slowly improving.     No other skin concerns; nothing on the skin that is bleeding, ulcerating, painful, growing or changing.      DERM-RELEVANT HISTORY:  History of skin cancer-    -sBCC left antihelix MMS 8/28/24 Dr Hoyt, 5 stages, Efudex recommended following  -sBCC left anterior lower leg, ED&C 4/24/23  -BCC right lateral forehead, MMS 3/2/22  -superficial pigmented BCC right thigh s/p ED&C 7/2017     Nevus with unusual features right medial foot, removal of any residual lesion/clinical follow-up is recommended, biopsied 10/15/21     History of actinic keratoses treated with cryotherapy      Family history of skin cancer--No family history of skin cancer   History of tanning bed use- No   Outdoor hobbies-Golf  Sun protective measures-wide brimmed hat, long sleeves, pants, sunscreen   Occupation-Retired   Verbal consent obtained to examine genitals, buttocks Yes.     REVIEW OF SYSTEMS:  Yes No  []  [x]  Other skin concerns, rash, or other changing lesions  []  [x]  Fevers, current, or recent illness  []  [x]  Easy bruising or bleeding     PAST MEDICAL HISTORY:  []  None  []  Melanoma    []  Asthma [x]  Skin Cancer  []  Eczema []  Keloids  [x]  Allergies []  Psoriasis  []  Vitiligo      PHYSICAL EXAM:   There were no vitals filed for this visit.  GENERAL:  Pleasant, alert, no acute distress, well-groomed.    Patient notified.       Skin: Examination of the scalp/body hair, face, neck, ears, eyelids/conjunctiva, lips/oral mucosa, chest, back, abdomen, right upper extremity, left upper extremity, right lower extremity, left lower extremity, digits/nails and genitals/buttocks was performed and findings were within normal limits unless specified below.      Findings include:   -scar left antihelix  - many brown macules/patches with feathered borders on sun exposed areas  - scattered stuck on brown papules and plaques  - cherry red papules scattered throughout  - scar right superior chest, right medial thigh, proximal nasal dorsum, left anterior lower leg  - scar right lateral forehead  - subcutaneous venous papules and plaques left 1st toe and distal foot   -scar right medial foot, no evidence of persistence/recurrence            ASSESSMENT AND PLAN:  History of basal cell carcinoma: As per derm relevant history above. No evidence of recurrence. Discussed concerning signs/symptoms of basal cell carcinoma and squamous cell carcinoma. Recommended calling with concerning skin changes. Discussed sun protection/avoidance.  He will treat left antihelix with Efudex as recommended by Dr. Hoyt.    Seborrheic keratoses: Discussed benign nature, provided reassurance.     History of nevus with unusual features:  As per derm relevant history above.  No evidence of persistence/recurrence.  Provided reassurance.  Call clinic with change/recurrence.     History of actinic keratoses: No current lesions. Treated with cryotherapy in the past. Provided reassurance. Discussed sun protection/avoidance.      Lentigines: Discussed benign nature, relationship to past sun exposure. Provided reassurance. Discussed sun protection/avoidance.      Angioma: Discussed benign, vascular nature, provided reassurance.      Discussed ABCDEs of pigmented lesions. Recommended calling with concerning skin changes. Discussed sun protection/avoidance.        Return in 6 months for  full skin exam.   Contact office sooner if condition is worsening or with new concerns.      On 12/30/2024, IGeraldo RN scribed the services personally performed by Lela Murray MD    I spent a total of 30 minutes on the day of the visit.  This includes chart review, documenting and direct patient care, exclusive of procedure time.    I, Lela Murray MD, attest that the documentation recorded by the scribe accurately and completely reflects the service(s) I personally performed and the decisions made by me. I also reviewed and verified the scribe's note, which I edited as appropriate.

## 2025-01-13 ENCOUNTER — TELEPHONE (OUTPATIENT)
Dept: FAMILY MEDICINE | Facility: OTHER | Age: 65
End: 2025-01-13

## 2025-01-13 ENCOUNTER — LAB (OUTPATIENT)
Dept: LAB | Facility: OTHER | Age: 65
End: 2025-01-13
Payer: COMMERCIAL

## 2025-01-13 DIAGNOSIS — R97.20 ELEVATED PROSTATE SPECIFIC ANTIGEN (PSA): ICD-10-CM

## 2025-01-13 DIAGNOSIS — R97.20 ELEVATED PROSTATE SPECIFIC ANTIGEN (PSA): Primary | ICD-10-CM

## 2025-01-13 LAB — PSA SERPL DL<=0.01 NG/ML-MCNC: 5.98 NG/ML (ref 0–4.5)

## 2025-01-13 PROCEDURE — 36415 COLL VENOUS BLD VENIPUNCTURE: CPT | Mod: ZL

## 2025-01-13 PROCEDURE — 84153 ASSAY OF PSA TOTAL: CPT | Mod: ZL

## 2025-01-13 NOTE — TELEPHONE ENCOUNTER
Pt is currently in [place] for a family emergency and doesn't know when he will be back.  Pt states he cannot do MRI, can he do a CT?

## 2025-01-14 NOTE — TELEPHONE ENCOUNTER
No, not really.  Can we have Villa followup with me when he returns and I will talk options.  The alternative is biopsy generally, but we will talk options and recheck the psa on his return before taking action.  Thanks  Judah Moody MD

## 2025-01-22 ENCOUNTER — TELEPHONE (OUTPATIENT)
Dept: FAMILY MEDICINE | Facility: OTHER | Age: 65
End: 2025-01-22

## 2025-01-22 DIAGNOSIS — R97.20 ELEVATED PROSTATE SPECIFIC ANTIGEN (PSA): Primary | ICD-10-CM

## 2025-01-22 NOTE — TELEPHONE ENCOUNTER
9:05 AM    Reason for Call: Phone Call    Description:   Villa needs  Haley to call him regarding labs he needs done due to his prostate issues. Please call Villa    Was an appointment offered for this call? No  If yes : Appointment type              Date    Preferred method for responding to this message: Telephone Call  What is your phone number ?    [phone]   If we cannot reach you directly, may we leave a detailed response at the number you provided? Yes    Can this message wait until your PCP/provider returns, if available today? Not applicable    Altagracia Palm

## 2025-01-28 ENCOUNTER — LAB (OUTPATIENT)
Dept: LAB | Facility: OTHER | Age: 65
End: 2025-01-28
Payer: COMMERCIAL

## 2025-01-28 DIAGNOSIS — R97.20 ELEVATED PROSTATE SPECIFIC ANTIGEN (PSA): ICD-10-CM

## 2025-01-28 LAB — PSA SERPL DL<=0.01 NG/ML-MCNC: 6.65 NG/ML (ref 0–4.5)

## 2025-01-28 PROCEDURE — 36415 COLL VENOUS BLD VENIPUNCTURE: CPT | Mod: ZL

## 2025-01-28 PROCEDURE — 84153 ASSAY OF PSA TOTAL: CPT | Mod: ZL

## 2025-01-29 NOTE — PROGRESS NOTES
"  Assessment & Plan     Elevated prostate specific antigen (PSA)  Reviewed.  He asked for a confirmation which confirmed the elevation.  Recommend MRI.  He can never do that due to tight space.  He absolutely declines.      Spasm of abdominal muscles  Ok for flexeril.  Came from vomiting with recent GI illness he's finally better.    - cyclobenzaprine (FLEXERIL) 10 MG tablet; Take 1 tablet (10 mg) by mouth 3 times daily as needed for muscle spasms.    Thrush  Nystatin and follow.    - Adult Urology  Referral; Future  - nystatin (MYCOSTATIN) 235895 UNIT/ML suspension; Take 5 mLs (500,000 Units) by mouth 4 times daily.          BMI  Estimated body mass index is 20.81 kg/m  as calculated from the following:    Height as of 2/13/24: 1.778 m (5' 10\").    Weight as of this encounter: 65.8 kg (145 lb).             No follow-ups on file.    Fredo Driver is a 64 year old, presenting for the following health issues:  RECHECK        2/4/2025    10:04 AM   Additional Questions   Roomed by Haley PENDLETON       Pt is here to discuss PSA lab.   Weight loss       Review of Systems  Constitutional, HEENT, cardiovascular, pulmonary, gi and gu systems are negative, except as otherwise noted.      Objective    BP 98/58   Pulse 114   Temp 96.9  F (36.1  C) (Tympanic)   Wt 65.8 kg (145 lb)   SpO2 92%   BMI 20.81 kg/m    Body mass index is 20.81 kg/m .  Physical Exam   GENERAL: alert and no distress  HENT: tongue with thick white coating.    NECK: no adenopathy, no asymmetry, masses, or scars  RESP: lungs clear to auscultation - no rales, rhonchi or wheezes  CV: regular rate and rhythm, normal S1 S2, no S3 or S4, no murmur, click or rub, no peripheral edema  ABDOMEN: soft, nontender, no hepatosplenomegaly, no masses and bowel sounds normal  MS: no gross musculoskeletal defects noted, no edema    PSA and labs reviewed.      The longitudinal plan of care for the diagnosis(es)/condition(s) as documented were addressed during " this visit. Due to the added complexity in care, I will continue to support Villa in the subsequent management and with ongoing continuity of care.        Signed Electronically by: Judah Moody MD

## 2025-02-04 ENCOUNTER — OFFICE VISIT (OUTPATIENT)
Dept: FAMILY MEDICINE | Facility: OTHER | Age: 65
End: 2025-02-04
Attending: FAMILY MEDICINE
Payer: COMMERCIAL

## 2025-02-04 VITALS
WEIGHT: 145 LBS | TEMPERATURE: 96.9 F | HEART RATE: 114 BPM | BODY MASS INDEX: 20.81 KG/M2 | SYSTOLIC BLOOD PRESSURE: 98 MMHG | OXYGEN SATURATION: 92 % | DIASTOLIC BLOOD PRESSURE: 58 MMHG

## 2025-02-04 DIAGNOSIS — B37.0 THRUSH: ICD-10-CM

## 2025-02-04 DIAGNOSIS — M62.838 SPASM OF ABDOMINAL MUSCLES: ICD-10-CM

## 2025-02-04 DIAGNOSIS — R97.20 ELEVATED PROSTATE SPECIFIC ANTIGEN (PSA): Primary | ICD-10-CM

## 2025-02-04 PROCEDURE — G0463 HOSPITAL OUTPT CLINIC VISIT: HCPCS

## 2025-02-04 RX ORDER — CYCLOBENZAPRINE HCL 10 MG
10 TABLET ORAL 3 TIMES DAILY PRN
Qty: 30 TABLET | Refills: 3 | Status: SHIPPED | OUTPATIENT
Start: 2025-02-04

## 2025-02-04 RX ORDER — NYSTATIN 100000 [USP'U]/ML
500000 SUSPENSION ORAL 4 TIMES DAILY
Qty: 60 ML | Refills: 1 | Status: SHIPPED | OUTPATIENT
Start: 2025-02-04

## 2025-02-04 ASSESSMENT — PATIENT HEALTH QUESTIONNAIRE - PHQ9
SUM OF ALL RESPONSES TO PHQ QUESTIONS 1-9: 4
10. IF YOU CHECKED OFF ANY PROBLEMS, HOW DIFFICULT HAVE THESE PROBLEMS MADE IT FOR YOU TO DO YOUR WORK, TAKE CARE OF THINGS AT HOME, OR GET ALONG WITH OTHER PEOPLE: NOT DIFFICULT AT ALL
SUM OF ALL RESPONSES TO PHQ QUESTIONS 1-9: 4

## 2025-02-04 ASSESSMENT — PAIN SCALES - GENERAL: PAINLEVEL_OUTOF10: NO PAIN (0)
